# Patient Record
Sex: FEMALE | Race: BLACK OR AFRICAN AMERICAN | Employment: FULL TIME | URBAN - METROPOLITAN AREA
[De-identification: names, ages, dates, MRNs, and addresses within clinical notes are randomized per-mention and may not be internally consistent; named-entity substitution may affect disease eponyms.]

---

## 2017-12-05 ENCOUNTER — PRENATAL OFFICE VISIT (OUTPATIENT)
Dept: NURSING | Facility: CLINIC | Age: 22
End: 2017-12-05
Payer: COMMERCIAL

## 2017-12-05 VITALS
HEIGHT: 63 IN | DIASTOLIC BLOOD PRESSURE: 60 MMHG | TEMPERATURE: 98 F | BODY MASS INDEX: 21.26 KG/M2 | SYSTOLIC BLOOD PRESSURE: 102 MMHG | WEIGHT: 120 LBS | HEART RATE: 94 BPM

## 2017-12-05 DIAGNOSIS — Z34.90 SUPERVISION OF NORMAL PREGNANCY: Primary | ICD-10-CM

## 2017-12-05 PROBLEM — H52.03 HYPEROPIA, BILATERAL: Status: ACTIVE | Noted: 2017-06-01

## 2017-12-05 PROBLEM — H04.123 DRY EYES, BILATERAL: Status: ACTIVE | Noted: 2017-06-01

## 2017-12-05 LAB
ABO + RH BLD: NORMAL
ABO + RH BLD: NORMAL
ALBUMIN UR-MCNC: NEGATIVE MG/DL
APPEARANCE UR: CLEAR
BILIRUB UR QL STRIP: NEGATIVE
BLD GP AB SCN SERPL QL: NORMAL
BLOOD BANK CMNT PATIENT-IMP: NORMAL
COLOR UR AUTO: YELLOW
ERYTHROCYTE [DISTWIDTH] IN BLOOD BY AUTOMATED COUNT: 12.6 % (ref 10–15)
GLUCOSE UR STRIP-MCNC: NEGATIVE MG/DL
HCT VFR BLD AUTO: 31.1 % (ref 35–47)
HGB BLD-MCNC: 10.9 G/DL (ref 11.7–15.7)
HGB UR QL STRIP: NEGATIVE
KETONES UR STRIP-MCNC: NEGATIVE MG/DL
LEUKOCYTE ESTERASE UR QL STRIP: NEGATIVE
MCH RBC QN AUTO: 31.5 PG (ref 26.5–33)
MCHC RBC AUTO-ENTMCNC: 35 G/DL (ref 31.5–36.5)
MCV RBC AUTO: 90 FL (ref 78–100)
NITRATE UR QL: NEGATIVE
PH UR STRIP: 7 PH (ref 5–7)
PLATELET # BLD AUTO: 224 10E9/L (ref 150–450)
RBC # BLD AUTO: 3.46 10E12/L (ref 3.8–5.2)
SOURCE: ABNORMAL
SP GR UR STRIP: 1.01 (ref 1–1.03)
SPECIMEN EXP DATE BLD: NORMAL
UROBILINOGEN UR STRIP-ACNC: 2 EU/DL (ref 0.2–1)
WBC # BLD AUTO: 5.6 10E9/L (ref 4–11)

## 2017-12-05 PROCEDURE — 86901 BLOOD TYPING SEROLOGIC RH(D): CPT | Performed by: ADVANCED PRACTICE MIDWIFE

## 2017-12-05 PROCEDURE — 85027 COMPLETE CBC AUTOMATED: CPT | Performed by: ADVANCED PRACTICE MIDWIFE

## 2017-12-05 PROCEDURE — 86780 TREPONEMA PALLIDUM: CPT | Performed by: ADVANCED PRACTICE MIDWIFE

## 2017-12-05 PROCEDURE — 83021 HEMOGLOBIN CHROMOTOGRAPHY: CPT | Mod: 90 | Performed by: ADVANCED PRACTICE MIDWIFE

## 2017-12-05 PROCEDURE — 83020 HEMOGLOBIN ELECTROPHORESIS: CPT | Mod: 90 | Performed by: ADVANCED PRACTICE MIDWIFE

## 2017-12-05 PROCEDURE — 87340 HEPATITIS B SURFACE AG IA: CPT | Performed by: ADVANCED PRACTICE MIDWIFE

## 2017-12-05 PROCEDURE — 99207 ZZC NO CHARGE NURSE ONLY: CPT

## 2017-12-05 PROCEDURE — 87086 URINE CULTURE/COLONY COUNT: CPT | Performed by: ADVANCED PRACTICE MIDWIFE

## 2017-12-05 PROCEDURE — 86850 RBC ANTIBODY SCREEN: CPT | Performed by: ADVANCED PRACTICE MIDWIFE

## 2017-12-05 PROCEDURE — 99000 SPECIMEN HANDLING OFFICE-LAB: CPT | Performed by: ADVANCED PRACTICE MIDWIFE

## 2017-12-05 PROCEDURE — 81003 URINALYSIS AUTO W/O SCOPE: CPT | Performed by: ADVANCED PRACTICE MIDWIFE

## 2017-12-05 PROCEDURE — 36415 COLL VENOUS BLD VENIPUNCTURE: CPT | Performed by: ADVANCED PRACTICE MIDWIFE

## 2017-12-05 PROCEDURE — 86762 RUBELLA ANTIBODY: CPT | Performed by: ADVANCED PRACTICE MIDWIFE

## 2017-12-05 PROCEDURE — 86900 BLOOD TYPING SEROLOGIC ABO: CPT | Performed by: ADVANCED PRACTICE MIDWIFE

## 2017-12-05 PROCEDURE — 87389 HIV-1 AG W/HIV-1&-2 AB AG IA: CPT | Performed by: ADVANCED PRACTICE MIDWIFE

## 2017-12-05 RX ORDER — OLANZAPINE 10 MG/1
10 TABLET ORAL
COMMUNITY
Start: 2017-11-10 | End: 2018-04-16

## 2017-12-05 RX ORDER — PRENATAL VIT/IRON FUM/FOLIC AC 27MG-0.8MG
1 TABLET ORAL DAILY
COMMUNITY
End: 2018-08-21

## 2017-12-05 NOTE — NURSING NOTE
"Chief Complaint   Patient presents with     Prenatal Care     NEW OB TEACHING AND LABS       Initial /60  Pulse 94  Temp 98  F (36.7  C)  Ht 5' 3\" (1.6 m)  Wt 120 lb (54.4 kg)  LMP 10/22/2017  BMI 21.26 kg/m2 Estimated body mass index is 21.26 kg/(m^2) as calculated from the following:    Height as of this encounter: 5' 3\" (1.6 m).    Weight as of this encounter: 120 lb (54.4 kg).  Medication Reconciliation: complete    "

## 2017-12-05 NOTE — MR AVS SNAPSHOT
After Visit Summary   12/5/2017    Sharmayne Morgan    MRN: [id number]           Patient Information     Date Of Birth          1995        Visit Information        Provider Department      12/5/2017 12:45 PM RD OB [place]SE [place] [facility]        Today's Diagnoses     Supervision of normal pregnancy    -  1       Follow-ups after your visit        Additional Services     MAT FETAL MED CTR REFERRAL-PREGNANCY       >> Patient may proceed with recommendations for further testing as directed by the Maternal Fetal Medicine Specialist >>    >> If requesting Fetal Echo: MFM will determine appropriate location for exam due to indication.    >> If requesting Lung Maturity Amnio:  If results indicate fetal lung maturity, induction or C/S is recommended within 36 hours.  Please schedule accordingly.     Dear Patient:   Please be aware that coverage of these services is subject to the terms and limitations of your health insurance plan.  Call member services at your health plan with any benefit or coverage questions.      Please bring the following to your appointment:    >>  Any x-rays, CTs or MRIs which have been performed.  Contact the facility where they were done to arrange for  prior to your scheduled appointment.  Any new CT, MRI or other procedures ordered by your specialist must be performed at a [facility] facility or coordinated by your clinic's referral office.  >>  List of current medications   >>  This referral request   >>  Any documents/labs given to you for this referral                  Your next 10 appointments already scheduled     Jan 02, 2018 12:00 PM CST   New Prenatal with ANGELA Gomes CNM   [facility] [place]    [address] [place] [id number] [phone]              Who to contact     If you have questions or need follow up information about today's clinic visit or your  "schedule please contact [facility] directly at [phone].  Normal or non-critical lab and imaging results will be communicated to you by MyChart, letter or phone within 4 business days after the clinic has received the results. If you do not hear from us within 7 days, please contact the clinic through MyChart or phone. If you have a critical or abnormal lab result, we will notify you by phone as soon as possible.  Submit refill requests through [employer] or call your pharmacy and they will forward the refill request to us. Please allow 3 business days for your refill to be completed.          Additional Information About Your Visit        Care EveryWhere ID     This is your Care EveryWhere ID. This could be used by other organizations to access your [place] medical records  [id number]        Your Vitals Were     Pulse Temperature Height Last Period BMI (Body Mass Index)       94 98  F (36.7  C) 5' 3\" (1.6 m) 10/22/2017 21.26 kg/m2        Blood Pressure from Last 3 Encounters:   12/05/17 102/60    Weight from Last 3 Encounters:   12/05/17 120 lb (54.4 kg)              We Performed the Following     ABO/RH Type and Screen     Anti Treponema     CBC with Platelets     Hepatitis B surface antigen     HGB Eval Reflex to ELP or RBC Solubility     HIV Antigen Antibody Combo     MAT FETAL MED CTR REFERRAL-PREGNANCY     Rubella Antibody IgG Quantitative     UA without Microscopic     Urine Culture Aerobic Bacterial        Primary Care Provider Fax #    Physician No Ref-Primary [phone]       No address on file        Equal Access to Services     [place]ON [place] : Hadii brian cardo Sovaneali, waaxda luqadaha, qaybta kaalmada adeegyada, oralia onofre . So [facility] [phone].    ATENCIÓN: Si habla español, tiene a horn disposición servicios gratuitos de asistencia lingüística. Llame al [phone].    We comply with applicable federal civil rights laws and [place] laws. We do " not discriminate on the basis of race, color, national origin, age, disability, sex, sexual orientation, or gender identity.            Thank you!     Thank you for choosing [facility]  for your care. Our goal is always to provide you with excellent care. Hearing back from our patients is one way we can continue to improve our services. Please take a few minutes to complete the written survey that you may receive in the mail after your visit with us. Thank you!             Your Updated Medication List - Protect others around you: Learn how to safely use, store and throw away your medicines at www.disposemymeds.org.          This list is accurate as of: 12/5/17  1:23 PM.  Always use your most recent med list.                   Brand Name Dispense Instructions for use Diagnosis    OLANZapine 10 MG tablet    zyPREXA     Take 10 mg by mouth        prenatal multivitamin plus iron 27-0.8 MG Tabs per tablet      Take 1 tablet by mouth daily        sertraline 50 MG tablet    ZOLOFT     Take 50 mg by mouth
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
## 2017-12-06 LAB
BACTERIA SPEC CULT: NORMAL
HBV SURFACE AG SERPL QL IA: NONREACTIVE
HIV 1+2 AB+HIV1 P24 AG SERPL QL IA: NONREACTIVE
RUBV IGG SERPL IA-ACNC: 11 IU/ML
SPECIMEN SOURCE: NORMAL
T PALLIDUM IGG+IGM SER QL: NEGATIVE

## 2017-12-08 LAB
HGB A1 MFR BLD: 96.5 % (ref 95–97.9)
HGB A2 MFR BLD: 3 % (ref 2–3.5)
HGB C MFR BLD: 0 % (ref 0–0)
HGB E MFR BLD: 0 % (ref 0–0)
HGB F MFR BLD: 0.5 % (ref 0–2.1)
HGB FRACT BLD ELPH-IMP: NORMAL
HGB OTHER MFR BLD: 0 % (ref 0–0)
HGB S BLD QL SOLY: NORMAL
HGB S MFR BLD: 0 % (ref 0–0)
PATH INTERP BLD-IMP: NORMAL

## 2018-01-25 ENCOUNTER — TRANSFERRED RECORDS (OUTPATIENT)
Dept: HEALTH INFORMATION MANAGEMENT | Facility: CLINIC | Age: 23
End: 2018-01-25

## 2018-01-25 LAB
CHLAMYDIA - HIM PATIENT REPORTED: NORMAL
PAP-ABSTRACT: NORMAL

## 2018-04-16 ENCOUNTER — PRENATAL OFFICE VISIT (OUTPATIENT)
Dept: NURSING | Facility: CLINIC | Age: 23
End: 2018-04-16

## 2018-04-16 VITALS
DIASTOLIC BLOOD PRESSURE: 64 MMHG | SYSTOLIC BLOOD PRESSURE: 102 MMHG | HEART RATE: 78 BPM | HEIGHT: 63 IN | WEIGHT: 119.4 LBS | BODY MASS INDEX: 21.16 KG/M2 | TEMPERATURE: 98 F

## 2018-04-16 DIAGNOSIS — Z34.90 SUPERVISION OF NORMAL PREGNANCY: Primary | ICD-10-CM

## 2018-04-16 PROBLEM — Z23 NEED FOR TDAP VACCINATION: Status: ACTIVE | Noted: 2018-04-16

## 2018-04-16 LAB
ALBUMIN UR-MCNC: NEGATIVE MG/DL
APPEARANCE UR: CLEAR
BILIRUB UR QL STRIP: NEGATIVE
COLOR UR AUTO: YELLOW
ERYTHROCYTE [DISTWIDTH] IN BLOOD BY AUTOMATED COUNT: 12.7 % (ref 10–15)
GLUCOSE UR STRIP-MCNC: NEGATIVE MG/DL
HCT VFR BLD AUTO: 33.9 % (ref 35–47)
HGB BLD-MCNC: 11.6 G/DL (ref 11.7–15.7)
HGB UR QL STRIP: NEGATIVE
KETONES UR STRIP-MCNC: NEGATIVE MG/DL
LEUKOCYTE ESTERASE UR QL STRIP: NEGATIVE
MCH RBC QN AUTO: 30.4 PG (ref 26.5–33)
MCHC RBC AUTO-ENTMCNC: 34.2 G/DL (ref 31.5–36.5)
MCV RBC AUTO: 89 FL (ref 78–100)
NITRATE UR QL: NEGATIVE
PH UR STRIP: 5.5 PH (ref 5–7)
PLATELET # BLD AUTO: 234 10E9/L (ref 150–450)
RBC # BLD AUTO: 3.81 10E12/L (ref 3.8–5.2)
SOURCE: NORMAL
SP GR UR STRIP: 1.02 (ref 1–1.03)
UROBILINOGEN UR STRIP-ACNC: 0.2 EU/DL (ref 0.2–1)
WBC # BLD AUTO: 4.5 10E9/L (ref 4–11)

## 2018-04-16 PROCEDURE — 86780 TREPONEMA PALLIDUM: CPT | Performed by: ADVANCED PRACTICE MIDWIFE

## 2018-04-16 PROCEDURE — 87086 URINE CULTURE/COLONY COUNT: CPT | Performed by: ADVANCED PRACTICE MIDWIFE

## 2018-04-16 PROCEDURE — 99207 ZZC NO CHARGE NURSE ONLY: CPT

## 2018-04-16 PROCEDURE — 86901 BLOOD TYPING SEROLOGIC RH(D): CPT | Performed by: ADVANCED PRACTICE MIDWIFE

## 2018-04-16 PROCEDURE — 36415 COLL VENOUS BLD VENIPUNCTURE: CPT | Performed by: ADVANCED PRACTICE MIDWIFE

## 2018-04-16 PROCEDURE — 86900 BLOOD TYPING SEROLOGIC ABO: CPT | Performed by: ADVANCED PRACTICE MIDWIFE

## 2018-04-16 PROCEDURE — 86850 RBC ANTIBODY SCREEN: CPT | Performed by: ADVANCED PRACTICE MIDWIFE

## 2018-04-16 PROCEDURE — 86762 RUBELLA ANTIBODY: CPT | Performed by: ADVANCED PRACTICE MIDWIFE

## 2018-04-16 PROCEDURE — 87340 HEPATITIS B SURFACE AG IA: CPT | Performed by: ADVANCED PRACTICE MIDWIFE

## 2018-04-16 PROCEDURE — 81003 URINALYSIS AUTO W/O SCOPE: CPT | Performed by: ADVANCED PRACTICE MIDWIFE

## 2018-04-16 PROCEDURE — 87389 HIV-1 AG W/HIV-1&-2 AB AG IA: CPT | Performed by: ADVANCED PRACTICE MIDWIFE

## 2018-04-16 PROCEDURE — 85027 COMPLETE CBC AUTOMATED: CPT | Performed by: ADVANCED PRACTICE MIDWIFE

## 2018-04-16 NOTE — MR AVS SNAPSHOT
"              After Visit Summary   4/16/2018    Sharmayne Morgan    MRN: [id number]           Patient Information     Date Of Birth          1995        Visit Information        Provider Department      4/16/2018 12:45 PM RD OB NURSE EDUCATION [facility]        Today's Diagnoses     Supervision of normal pregnancy    -  1       Follow-ups after your visit        Your next 10 appointments already scheduled     May 14, 2018 11:00 AM CDT   New Prenatal with ANGELA Henao CNM   [facility] ([facility])    [address] [place] [id number] [phone]              Who to contact     If you have questions or need follow up information about today's clinic visit or your schedule please contact [facility] directly at [phone].  Normal or non-critical lab and imaging results will be communicated to you by MyChart, letter or phone within 4 business days after the clinic has received the results. If you do not hear from us within 7 days, please contact the clinic through MyChart or phone. If you have a critical or abnormal lab result, we will notify you by phone as soon as possible.  Submit refill requests through [employer] or call your pharmacy and they will forward the refill request to us. Please allow 3 business days for your refill to be completed.          Additional Information About Your Visit        MyChart Information     [employer] lets you send messages to your doctor, view your test results, renew your prescriptions, schedule appointments and more. To sign up, go to [email or web address] . Click on \"Log in\" on the left side of the screen, which will take you to the Welcome page. Then click on \"Sign up Now\" on the right side of the page.     You will be asked to enter the access code listed below, as well as some personal information. Please follow the directions to create your username and password.   " "  Your access code is: [email or web address]  Expires: 7/15/2018  1:32 PM     Your access code will  in 90 days. If you need help or a new code, please call your [place] clinic or [phone].        Care EveryWhere ID     This is your Care EveryWhere ID. This could be used by other organizations to access your [place] medical records  [id number]        Your Vitals Were     Pulse Temperature Height Last Period Breastfeeding? BMI (Body Mass Index)    78 98  F (36.7  C) 5' 3\" (1.6 m) 2018 Unknown 21.15 kg/m2       Blood Pressure from Last 3 Encounters:   18 102/64   17 102/60    Weight from Last 3 Encounters:   18 119 lb 6.4 oz (54.2 kg)   17 120 lb (54.4 kg)              We Performed the Following     ABO/RH Type and Screen     Anti Treponema     CBC with Platelets     Hepatitis B surface antigen     HIV Antigen Antibody Combo     Rubella Antibody IgG Quantitative     UA without Microscopic     Urine Culture Aerobic Bacterial        Primary Care Provider Fax #    Physician No Ref-Primary [phone]       No address on file        Equal Access to Services     [facility] : Hadii aad ku hadasho Soomaali, waaxda luqadaha, qaybta kaalmada adeegyada, oralia onofre . So [facility] [phone].    ATENCIÓN: Si habla español, tiene a horn disposición servicios gratuitos de asistencia lingüística. Llame al [phone].    We comply with applicable federal civil rights laws and [place] laws. We do not discriminate on the basis of race, color, national origin, age, disability, sex, sexual orientation, or gender identity.            Thank you!     Thank you for choosing [facility]  for your care. Our goal is always to provide you with excellent care. Hearing back from our patients is one way we can continue to improve our services. Please take a few minutes to complete the written survey that you may receive in the mail after your visit with us. Thank " you!             Your Updated Medication List - Protect others around you: Learn how to safely use, store and throw away your medicines at www.disposemymeds.org.          This list is accurate as of 4/16/18  1:32 PM.  Always use your most recent med list.                   Brand Name Dispense Instructions for use Diagnosis    prenatal multivitamin plus iron 27-0.8 MG Tabs per tablet      Take 1 tablet by mouth daily

## 2018-04-16 NOTE — NURSING NOTE
"Chief Complaint   Patient presents with     Prenatal Care     new ob teaching and labs       Initial /64  Pulse 78  Temp 98  F (36.7  C)  Ht 5' 3\" (1.6 m)  Wt 119 lb 6.4 oz (54.2 kg)  LMP 03/05/2018  Breastfeeding? Unknown  BMI 21.15 kg/m2 Estimated body mass index is 21.15 kg/(m^2) as calculated from the following:    Height as of this encounter: 5' 3\" (1.6 m).    Weight as of this encounter: 119 lb 6.4 oz (54.2 kg).  Medication Reconciliation: complete    "
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
## 2018-04-17 LAB
ABO + RH BLD: NORMAL
ABO + RH BLD: NORMAL
BACTERIA SPEC CULT: NORMAL
BLD GP AB SCN SERPL QL: NORMAL
BLOOD BANK CMNT PATIENT-IMP: NORMAL
HBV SURFACE AG SERPL QL IA: NONREACTIVE
HIV 1+2 AB+HIV1 P24 AG SERPL QL IA: NONREACTIVE
SPECIMEN EXP DATE BLD: NORMAL
SPECIMEN SOURCE: NORMAL

## 2018-04-18 LAB
RUBV IGG SERPL IA-ACNC: 13 IU/ML
T PALLIDUM IGG+IGM SER QL: NEGATIVE

## 2018-05-14 ENCOUNTER — TELEPHONE (OUTPATIENT)
Dept: MIDWIFE SERVICES | Facility: CLINIC | Age: 23
End: 2018-05-14

## 2018-05-14 ENCOUNTER — PRENATAL OFFICE VISIT (OUTPATIENT)
Dept: MIDWIFE SERVICES | Facility: CLINIC | Age: 23
End: 2018-05-14
Payer: COMMERCIAL

## 2018-05-14 VITALS
OXYGEN SATURATION: 98 % | WEIGHT: 120.7 LBS | BODY MASS INDEX: 21.39 KG/M2 | SYSTOLIC BLOOD PRESSURE: 112 MMHG | TEMPERATURE: 98.2 F | HEART RATE: 89 BPM | HEIGHT: 63 IN | DIASTOLIC BLOOD PRESSURE: 70 MMHG

## 2018-05-14 DIAGNOSIS — Z34.81 ENCOUNTER FOR SUPERVISION OF OTHER NORMAL PREGNANCY IN FIRST TRIMESTER: ICD-10-CM

## 2018-05-14 DIAGNOSIS — Z12.4 SCREENING FOR CERVICAL CANCER: Primary | ICD-10-CM

## 2018-05-14 DIAGNOSIS — Z11.3 SCREENING EXAMINATION FOR VENEREAL DISEASE: ICD-10-CM

## 2018-05-14 DIAGNOSIS — Z34.80 SUPERVISION OF OTHER NORMAL PREGNANCY, ANTEPARTUM: ICD-10-CM

## 2018-05-14 DIAGNOSIS — O21.9 NAUSEA AND VOMITING OF PREGNANCY, ANTEPARTUM: ICD-10-CM

## 2018-05-14 DIAGNOSIS — L73.2 HYDRADENITIS: Primary | ICD-10-CM

## 2018-05-14 DIAGNOSIS — L73.2 HYDRADENITIS: ICD-10-CM

## 2018-05-14 PROCEDURE — 99207 ZZC FIRST OB VISIT: CPT | Performed by: ADVANCED PRACTICE MIDWIFE

## 2018-05-14 RX ORDER — CLINDAMYCIN PHOSPHATE 11.9 MG/ML
SOLUTION TOPICAL 2 TIMES DAILY
Qty: 60 ML | Refills: 11 | Status: SHIPPED | OUTPATIENT
Start: 2018-05-14 | End: 2019-06-24

## 2018-05-14 RX ORDER — PYRIDOXINE HCL (VITAMIN B6) 50 MG
50 TABLET ORAL 2 TIMES DAILY PRN
Qty: 90 TABLET | Refills: 0 | Status: SHIPPED | OUTPATIENT
Start: 2018-05-14 | End: 2018-11-26

## 2018-05-14 RX ORDER — CLINDAMYCIN PHOSPHATE, BENZOYL PEROXIDE 25; 10 MG/G; MG/G
0.5 GEL TOPICAL DAILY
Qty: 50 G | Refills: 11 | Status: SHIPPED | OUTPATIENT
Start: 2018-05-14 | End: 2019-06-24

## 2018-05-14 NOTE — PROGRESS NOTES
"NOB intake at [place].  C/O feeling fatigued and nauseated most days,  Reviewed comfort measures and rx for vit B6 and unisom given.  Pt also asking for rx for Hydradenitis, given.  Unsure dates, pt states she had an AB in January, then had to have a second D&C, and never really got a regular menses after that.  Dating U/S ordered.  Desires 1st tri screening, ordered.  Review of CNM service, call rotational and triage line.  Pt had pap at [facility] and it is in care everywhere.   RTC 4 wks     Sharmayne Morgan is a 22 year old female, -American, single    Pt presents to clinic today for a NOB visit.  Patient's last menstrual period was 2018.. Estimated Date of Delivery: Dec 10, 2018 is calculated from lmp alone     She has not had bleeding since her LMP.   She has had mild nausea. Weight loss has not occurred.   This was a planned pregnancy.   FOB is involved,  Alfredo     OTHER CONCERNS: Would like derm meds for Hydradenitis    Pt's hx of HSV is negative    ============================================  PERSONAL/SOCIAL HISTORY  Lives lives with their family.  Employment: Unemployed.  Her job involves sedentary activity .  Exercises no regular exercise program  Pt denies abuse and feels safe in her home and relationships.     REVIEW OF SYSTEMS  C: NEGATIVE for fever, chills, change in weight  I: NEGATIVE for worrisome rashes, moles or lesions  E/M: NEGATIVE for ear, mouth and throat problems  R: NEGATIVE for significant cough or SOB  CV: NEGATIVE for chest pain, palpitations or peripheral edema  GI: NEGATIVE for nausea, abdominal pain, heartburn, or change in bowel habits  : NEGATIVE for frequency, dysuria, or hematuria  PSYCHIATRIC:  NEGATIVE for depression, anxiety or other mental health concerns    PHYSICAL EXAM:  /70 (BP Location: Left arm, Patient Position: Sitting, Cuff Size: Adult Regular)  Pulse 89  Temp 98.2  F (36.8  C) (Oral)  Ht 5' 3\" (1.6 m)  Wt 120 lb 11.2 oz (54.7 kg)  LMP " 2018  SpO2 98%  BMI 21.38 kg/m2  BMI- Body mass index is 21.38 kg/(m^2)., RECOMMENDED WEIGHT GAIN: 15-25 lbs.  GENERAL:  Pleasant pregnant female, alert, cooperative and well groomed.  SKIN:  Warm and dry, without lesions or rashes  HEAD: Symmetrical features.  MOUTH:  Buccal mucosa pink, moist without lesions.  Teeth in good repair.    NECK:  Thyroid without enlargement and nodules.  Lymph nodes not palpable.   LUNGS:  Clear to auscultation.      HEART:  RRR without murmur.  ABDOMEN: Soft without masses , tenderness or organomegaly.  No CVA tenderness.  Uterus not palpable at size equal to dates.  No scars noted..  MUSCULOSKELETAL:  Full range of motion  EXTREMITIES:  No edema. No significant varicosities.     GC/CHLAMYDIA CULTURE OBTAINED:PATIENT DECLINED    ASSESSMENT:  Intrauterine pregnancy at 10w0d weeks gestation.     (Z12.4) Screening for cervical cancer  (primary encounter diagnosis)  Comment:   Plan:     (Z11.3) Screening examination for venereal disease  Comment:   Plan:     (L73.2) Hydradenitis  Comment:   Plan: chlorhexidine (HIBICLENS) 4 % liquid,         clindamycin (CLEOCIN T) 1 % solution,         Clindamycin Phos-benzoyl Perox (ACANYA) 1.2-2.5        % GEL            (O21.9) Nausea and vomiting of pregnancy, antepartum  Comment:   Plan: pyridOXINE (CVS VITAMIN B-6) 50 MG tablet,         doxylamine (UNISOM) 25 MG TABS tablet            (Z34.81) Encounter for supervision of other normal pregnancy in first trimester  Comment:   Plan: MAT FETAL MED CTR REFERRAL-PREGNANCY,  OB <         14 Weeks Single            (Z34.80) Supervision of other normal pregnancy, antepartum  Comment:   Plan:       PLAN:  Oriented to CNM service.    Instructed on use of triage nurse line and contacting the on call CNM after hours for an urgent need such as fever, vagina bleeding, bladder or vaginal infection, rupture of membranes,  or term labor.      Discussed the indications, uses for and false positives  for quad screen  and fetal survey ultrasound at 18-20 weeks gestation. Will inform us at the next visit if she wished to avail herself of these screens.    Instructed on best evidence for: weight gain for her weight and height for pregnancy; healthy diet; exercise and activity during pregnancy; and maintenance of a generally healthy lifestyle.     Discussed the harms, benefits, side effects and alternative therapies for current prescribed and OTC medications.    Angela Coe CNM     - - -

## 2018-05-14 NOTE — PROGRESS NOTES
"Chief Complaint   Patient presents with     Prenatal Care     10+0.       Initial /70 (BP Location: Left arm, Patient Position: Sitting, Cuff Size: Adult Regular)  Pulse 89  Temp 98.2  F (36.8  C) (Oral)  Ht 5' 3\" (1.6 m)  Wt 120 lb 11.2 oz (54.7 kg)  LMP 2018  SpO2 98%  BMI 21.38 kg/m2 Estimated body mass index is 21.38 kg/(m^2) as calculated from the following:    Height as of this encounter: 5' 3\" (1.6 m).    Weight as of this encounter: 120 lb 11.2 oz (54.7 kg).  BP completed using cuff size: regular        The following HM Due: NONE      The following patient reported/Care Every where data was sent to:  P ABSTRACT QUALITY INITIATIVES [36626]  Pap smear done on this date: 2018 (approximately), by [place] group: [facility], results were NIL.   Chlamydia testing done on this date: 2018      n/a and patient has appointment for today              "

## 2018-05-14 NOTE — Clinical Note
Pap smear done on this date: 1/25/2018 (approximately), by this group: [facility], results were NIL.  Chlamydia testing done on this date: 1/25/2018

## 2018-05-14 NOTE — MR AVS SNAPSHOT
After Visit Summary   5/14/2018    Sharmayne Morgan    MRN: [id number]           Patient Information     Date Of Birth          1995        Visit Information        Provider Department      5/14/2018 11:00 AM Angela Coe APRN CNM [facility]        Today's Diagnoses     Screening for cervical cancer    -  1    Screening examination for venereal disease        Hydradenitis        Nausea and vomiting of pregnancy, antepartum        Encounter for supervision of other normal pregnancy in first trimester        Supervision of other normal pregnancy, antepartum           Follow-ups after your visit        Additional Services     MAT FETAL MED CTR REFERRAL-PREGNANCY       >> Patient may proceed with recommendations for further testing as directed by the Maternal Fetal Medicine Specialist >>    >> If requesting Fetal Echo: MFM will determine appropriate location for exam due to indication.    >> If requesting Lung Maturity Amnio:  If results indicate fetal lung maturity, induction or C/S is recommended within 36 hours.  Please schedule accordingly.     Dear Patient:   Please be aware that coverage of these services is subject to the terms and limitations of your health insurance plan.  Call member services at your health plan with any benefit or coverage questions.      Please bring the following to your appointment:    >>  Any x-rays, CTs or MRIs which have been performed.  Contact the facility where they were done to arrange for  prior to your scheduled appointment.  Any new CT, MRI or other procedures ordered by your specialist must be performed at a [facility] facility or coordinated by your clinic's referral office.  >>  List of current medications   >>  This referral request   >>  Any documents/labs given to you for this referral                  Your next 10 appointments already scheduled     May 24, 2018 10:20 AM CDT   US OB < 14 WEEKS SINGLE with RDUS1   [facility]  [facility])    [address] [place] [address] [address]  [facility] [address]   [phone]           Please bring a list of your medicines (including vitamins, minerals and over-the-counter drugs). Also, tell your doctor about any allergies you may have. Wear comfortable clothes and leave your valuables at home.  If you re less than 20 weeks drink four 8-ounce glasses of fluid an hour before your exam. If you need to empty your bladder before your exam, try to release only a little urine. Then, drink another glass of fluid.  You may have up to two family members in the exam room. If you bring a small child, an adult must be there to care for him or her.  Please call the Imaging Department at your exam site with any questions.            May 24, 2018 11:00 AM CDT   ESTABLISHED PRENATAL with Caridad Che CNM   [address]  [address]  [facility] [address]   [phone]            Jun 04, 2018 11:00 AM CDT   Genetic Counseling with UR GEN COUNSELOR 1   [facility] Maternal Fetal Medicine - [facility])    [address]  [facility] [address]   [phone]            Jun 04, 2018 11:45 AM CDT   MFM NUCHAL TRANSLUCENCY with [id number]   [facility] Maternal Fetal Medicine Ultrasound - [facility])    [address]  [facility] [address]   [phone]            Jun 04, 2018 12:15 PM CDT   Radiology MD with MOISES SAMUELS MD   [facility] Maternal Fetal Medicine - [facility])    [address]  [facility] [address]   [phone]           Please arrive at the time given for your first appointment. This visit is used internally to schedule the physician's time during your ultrasound.            Jun 11, 2018 10:00 AM CDT   ESTABLISHED PRENATAL with ANGELA Yao  "CNM   [facility]    [address] [place] [id number] [phone]              Future tests that were ordered for you today     Open Future Orders        Priority Expected Expires Ordered    M Genetic Counseling Routine  2019    Maternal Fetal Nuchal Translucency Routine  3/14/2019 2018    US OB < 14 Weeks Single Routine  2019            Who to contact     If you have questions or need follow up information about today's clinic visit or your schedule please contact [facility] directly at [phone].  Normal or non-critical lab and imaging results will be communicated to you by MyChart, letter or phone within 4 business days after the clinic has received the results. If you do not hear from us within 7 days, please contact the clinic through MyChart or phone. If you have a critical or abnormal lab result, we will notify you by phone as soon as possible.  Submit refill requests through MEDEM or call your pharmacy and they will forward the refill request to us. Please allow 3 business days for your refill to be completed.          Additional Information About Your Visit        MyChart Information     MEDEM lets you send messages to your doctor, view your test results, renew your prescriptions, schedule appointments and more. To sign up, go to [email or web address] . Click on \"Log in\" on the left side of the screen, which will take you to the Welcome page. Then click on \"Sign up Now\" on the right side of the page.     You will be asked to enter the access code listed below, as well as some personal information. Please follow the directions to create your username and password.     Your access code is: [email or web address]  Expires: 7/15/2018  1:32 PM     Your access code will  in 90 days. If you need help or a new code, please call your [facility] or [phone].        Care " "EveryWhere ID     This is your Care EveryWhere ID. This could be used by other organizations to access your [facility] medical records  [id number]        Your Vitals Were     Pulse Temperature Height Last Period Pulse Oximetry BMI (Body Mass Index)    89 98.2  F (36.8  C) (Oral) 5' 3\" (1.6 m) 03/05/2018 98% 21.38 kg/m2       Blood Pressure from Last 3 Encounters:   05/14/18 112/70   04/16/18 102/64   12/05/17 102/60    Weight from Last 3 Encounters:   05/14/18 120 lb 11.2 oz (54.7 kg)   04/16/18 119 lb 6.4 oz (54.2 kg)   12/05/17 120 lb (54.4 kg)              We Performed the Following     MAT FETAL MED CTR REFERRAL-PREGNANCY          Today's Medication Changes          These changes are accurate as of 5/14/18  1:44 PM.  If you have any questions, ask your nurse or doctor.               Start taking these medicines.        Dose/Directions    Benzoyl Peroxide 6 % Lotn   Commonly known as:  BENZOYL PEROXIDE CLEANSER   Used for:  Hydradenitis   Started by:  Angela Coe APRN CNM        Externally apply topically daily as needed   Quantity:  170.3 g   Refills:  3       chlorhexidine 4 % liquid   Commonly known as:  HIBICLENS   Used for:  Hydradenitis   Started by:  Angela Coe APRN CNM        Apply topically daily as needed for wound care   Quantity:  236 mL   Refills:  3       clindamycin 1 % solution   Commonly known as:  CLEOCIN T   Used for:  Hydradenitis   Started by:  Angela Coe APRN CNM        Apply topically 2 times daily   Quantity:  60 mL   Refills:  11       Clindamycin Phos-benzoyl Perox 1.2-2.5 % Gel   Commonly known as:  ACANYA   Used for:  Hydradenitis   Started by:  Angela Coe APRN CNM        Dose:  0.5 inch   Externally apply 0.5 inches topically daily Apply a pea sized amount to each affected area of the face   Quantity:  50 g   Refills:  11       doxylamine 25 MG Tabs tablet   Commonly known as:  UNISOM   Used for:  Nausea and vomiting of pregnancy, antepartum "   Started by:  Angela Coe APRN CNM        Dose:  25 mg   Take 1 tablet (25 mg) by mouth At Bedtime   Quantity:  14 each   Refills:  0       pyridOXINE 50 MG tablet   Commonly known as:  CVS VITAMIN B-6   Used for:  Nausea and vomiting of pregnancy, antepartum   Started by:  Angela Coe APRN CNJULIO        Dose:  50 mg   Take 1 tablet (50 mg) by mouth 2 times daily as needed   Quantity:  90 tablet   Refills:  0            Where to get your medicines      These medications were sent to [facility] [place], [place] - [address]  [address] [facility] [address]     Phone:  [phone]     Benzoyl Peroxide 6 % Lotn    chlorhexidine 4 % liquid    clindamycin 1 % solution    Clindamycin Phos-benzoyl Perox 1.2-2.5 % Gel    doxylamine 25 MG Tabs tablet    pyridOXINE 50 MG tablet                Primary Care Provider Fax #    Physician No Ref-Primary [phone]       No address on file        Equal Access to Services     TRISTON DEL VALLE : Hadii brian ku hadasho Soomaali, waaxda luqadaha, qaybta kaalmada adeegyada, waxay idiin haykelly onofre . So [facility] [phone].    ATENCIÓN: Si habla español, tiene a horn disposición servicios gratuitos de asistencia lingüística. [facility] [phone].    We comply with applicable federal civil rights laws and [place] laws. We do not discriminate on the basis of race, color, national origin, age, disability, sex, sexual orientation, or gender identity.            Thank you!     Thank you for choosing [facility]  for your care. Our goal is always to provide you with excellent care. Hearing back from our patients is one way we can continue to improve our services. Please take a few minutes to complete the written survey that you may receive in the mail after your visit with us. Thank you!             Your Updated Medication List - Protect others around you: Learn how to safely use, store and throw away your medicines at  www.disposemymeds.org.          This list is accurate as of 5/14/18  1:44 PM.  Always use your most recent med list.                   Brand Name Dispense Instructions for use Diagnosis    Benzoyl Peroxide 6 % Lotn    BENZOYL PEROXIDE CLEANSER    170.3 g    Externally apply topically daily as needed    Hydradenitis       chlorhexidine 4 % liquid    HIBICLENS    236 mL    Apply topically daily as needed for wound care    Hydradenitis       clindamycin 1 % solution    CLEOCIN T    60 mL    Apply topically 2 times daily    Hydradenitis       Clindamycin Phos-benzoyl Perox 1.2-2.5 % Gel    ACANYA    50 g    Externally apply 0.5 inches topically daily Apply a pea sized amount to each affected area of the face    Hydradenitis       doxylamine 25 MG Tabs tablet    UNISOM    14 each    Take 1 tablet (25 mg) by mouth At Bedtime    Nausea and vomiting of pregnancy, antepartum       prenatal multivitamin plus iron 27-0.8 MG Tabs per tablet      Take 1 tablet by mouth daily        pyridOXINE 50 MG tablet    CVS VITAMIN B-6    90 tablet    Take 1 tablet (50 mg) by mouth 2 times daily as needed    Nausea and vomiting of pregnancy, antepartum

## 2018-05-14 NOTE — TELEPHONE ENCOUNTER
Pharmacy calling regarding the prescription for Acanya - not covered by insurance. Insurance will cover either Clindamycin HCL or Benzoyl Peroxide (pharmacy recommends this one). I have teed up pharmacy. Please send new rx. Thanks!  Liz New

## 2018-05-24 ENCOUNTER — PRENATAL OFFICE VISIT (OUTPATIENT)
Dept: MIDWIFE SERVICES | Facility: CLINIC | Age: 23
End: 2018-05-24
Attending: ADVANCED PRACTICE MIDWIFE
Payer: COMMERCIAL

## 2018-05-24 ENCOUNTER — RADIANT APPOINTMENT (OUTPATIENT)
Dept: ULTRASOUND IMAGING | Facility: CLINIC | Age: 23
End: 2018-05-24
Attending: ADVANCED PRACTICE MIDWIFE
Payer: COMMERCIAL

## 2018-05-24 VITALS
TEMPERATURE: 98.1 F | DIASTOLIC BLOOD PRESSURE: 69 MMHG | SYSTOLIC BLOOD PRESSURE: 114 MMHG | HEART RATE: 100 BPM | WEIGHT: 118.5 LBS | BODY MASS INDEX: 20.99 KG/M2

## 2018-05-24 DIAGNOSIS — Z34.81 ENCOUNTER FOR SUPERVISION OF OTHER NORMAL PREGNANCY IN FIRST TRIMESTER: ICD-10-CM

## 2018-05-24 DIAGNOSIS — Z34.80 SUPERVISION OF OTHER NORMAL PREGNANCY, ANTEPARTUM: Primary | ICD-10-CM

## 2018-05-24 PROCEDURE — 99207 ZZC PRENATAL VISIT: CPT | Performed by: ADVANCED PRACTICE MIDWIFE

## 2018-05-24 PROCEDURE — 76815 OB US LIMITED FETUS(S): CPT | Performed by: OBSTETRICS & GYNECOLOGY

## 2018-05-24 NOTE — MR AVS SNAPSHOT
After Visit Summary   5/24/2018    Sharmayne Morgan    MRN: [id number]           Patient Information     Date Of Birth          1995        Visit Information        Provider Department      5/24/2018 11:00 AM Caridad Che CNM [facility]         Follow-ups after your visit        Follow-up notes from your care team     Return in about 4 weeks (around 6/21/2018) for Prenatal care with [facility].      Your next 10 appointments already scheduled     Jun 04, 2018 11:00 AM CDT   Genetic Counseling with UR GEN COUNSELOR 1   [facility] Maternal Fetal Medicine - [facility])    [address]  [facility] [address]   [phone]            Jun 04, 2018 11:45 AM CDT   ARVIND NUCHAL TRANSLUCENCY with [id number]   [facility] Maternal Fetal Medicine Ultrasound - [facility])    [address]  [facility] [id number] [phone]            Jun 04, 2018 12:15 PM CDT   Radiology MD with MOISES SAMUELS MD   [facility] Maternal Fetal Medicine - [facility])    [address]  [facility] [address]   [phone]           Please arrive at the time given for your first appointment. This visit is used internally to schedule the physician's time during your ultrasound.            Jun 11, 2018 10:00 AM CDT   ESTABLISHED PRENATAL with ANGELA Yao CNM   [facility] [place]    [address] [place] [id number] [phone]              Who to contact     If you have questions or need follow up information about today's clinic visit or your schedule please contact [facility] directly at [phone].  Normal or non-critical lab and imaging results will be communicated to you by MyChart, letter or phone within 4 business days after the clinic has received the results. If you do  "not hear from us within 7 days, please contact the clinic through [employer] or phone. If you have a critical or abnormal lab result, we will notify you by phone as soon as possible.  Submit refill requests through [employer] or call your pharmacy and they will forward the refill request to us. Please allow 3 business days for your refill to be completed.          Additional Information About Your Visit        [employer] Information     [employer] lets you send messages to your doctor, view your test results, renew your prescriptions, schedule appointments and more. To sign up, go to [email or web address] . Click on \"Log in\" on the left side of the screen, which will take you to the Welcome page. Then click on \"Sign up Now\" on the right side of the page.     You will be asked to enter the access code listed below, as well as some personal information. Please follow the directions to create your username and password.     Your access code is: [email or web address]  Expires: 7/15/2018  1:32 PM     Your access code will  in 90 days. If you need help or a new code, please call your [place] clinic or [phone].        Care EveryWhere ID     This is your Care EveryWhere ID. This could be used by other organizations to access your [place] medical records  [id number]        Your Vitals Were     Pulse Temperature Last Period BMI (Body Mass Index)          100 98.1  F (36.7  C) (Oral) 2018 (Exact Date) 20.99 kg/m2         Blood Pressure from Last 3 Encounters:   18 114/69   18 112/70   18 102/64    Weight from Last 3 Encounters:   18 118 lb 8 oz (53.8 kg)   18 120 lb 11.2 oz (54.7 kg)   18 119 lb 6.4 oz (54.2 kg)              Today, you had the following     No orders found for display       Primary Care Provider Fax #    Physician No Ref-Primary [phone]       No address on file        Equal Access to Services     TRISTON DEL VALLE : jonathan Marley, kevinybjanene " oralia simpsontarik onofre ah. [facility] [phone].    ATENCIÓN: Si erin sorto, tiene a horn disposición servicios gratuitos de asistencia lingüística. Marni al [phone].    We comply with applicable federal civil rights laws and [place] laws. We do not discriminate on the basis of race, color, national origin, age, disability, sex, sexual orientation, or gender identity.            Thank you!     Thank you for choosing [facility]  for your care. Our goal is always to provide you with excellent care. Hearing back from our patients is one way we can continue to improve our services. Please take a few minutes to complete the written survey that you may receive in the mail after your visit with us. Thank you!             Your Updated Medication List - Protect others around you: Learn how to safely use, store and throw away your medicines at www.disposemymeds.org.          This list is accurate as of 5/24/18 11:19 AM.  Always use your most recent med list.                   Brand Name Dispense Instructions for use Diagnosis    Benzoyl Peroxide 6 % Lotn    BENZOYL PEROXIDE CLEANSER    170.3 g    Externally apply topically daily as needed    Hydradenitis       chlorhexidine 4 % liquid    HIBICLENS    236 mL    Apply topically daily as needed for wound care    Hydradenitis       clindamycin 1 % solution    CLEOCIN T    60 mL    Apply topically 2 times daily    Hydradenitis       Clindamycin Phos-benzoyl Perox 1.2-2.5 % Gel    ACANYA    50 g    Externally apply 0.5 inches topically daily Apply a pea sized amount to each affected area of the face    Hydradenitis       doxylamine 25 MG Tabs tablet    UNISOM    14 each    Take 1 tablet (25 mg) by mouth At Bedtime    Nausea and vomiting of pregnancy, antepartum       prenatal multivitamin plus iron 27-0.8 MG Tabs per tablet      Take 1 tablet by mouth daily        pyridOXINE 50 MG tablet    CVS VITAMIN B-6    90 tablet     Take 1 tablet (50 mg) by mouth 2 times daily as needed    Nausea and vomiting of pregnancy, antepartum

## 2018-05-24 NOTE — PROGRESS NOTES
Denies any leaking of fluid, vaginal bleeding, regular uterine contractions, or headaches or other concerns.  Here for dating US follow up.  She is sure of last period date, but had a small amount of bleeding 2 mos after.  Dating kept as is as it is +/- 7 days.  I told her that we never know the exact date of conception or when the baby will be born.  As the dating difference is borderline, the MDs might recommend change due date.    They have first trimester screen scheduled.  Sharmayne and MEAGHAN both have friends who have lost babies to genetic problems.    MEAGHAN is very excited since this is his first baby.    Reviewed to call [phone] for contractions, loss of fluid, vaginal bleeding, decreased fetal movement or any other questions or concerns.    RTC in 4 weeks.  Caridad Che, SHEELA, APRN, CNM

## 2018-05-30 ENCOUNTER — TELEPHONE (OUTPATIENT)
Dept: MIDWIFE SERVICES | Facility: CLINIC | Age: 23
End: 2018-05-30

## 2018-05-30 DIAGNOSIS — O21.9 NAUSEA AND VOMITING IN PREGNANCY PRIOR TO 22 WEEKS GESTATION: Primary | ICD-10-CM

## 2018-05-30 RX ORDER — ONDANSETRON 4 MG/1
4-8 TABLET, ORALLY DISINTEGRATING ORAL EVERY 8 HOURS PRN
Qty: 20 TABLET | Refills: 3 | Status: SHIPPED | OUTPATIENT
Start: 2018-05-30 | End: 2018-11-26

## 2018-05-31 ENCOUNTER — PRE VISIT (OUTPATIENT)
Dept: MATERNAL FETAL MEDICINE | Facility: CLINIC | Age: 23
End: 2018-05-31

## 2018-06-04 ENCOUNTER — OFFICE VISIT (OUTPATIENT)
Dept: MATERNAL FETAL MEDICINE | Facility: CLINIC | Age: 23
End: 2018-06-04
Attending: ADVANCED PRACTICE MIDWIFE
Payer: COMMERCIAL

## 2018-06-04 ENCOUNTER — APPOINTMENT (OUTPATIENT)
Dept: LAB | Facility: CLINIC | Age: 23
End: 2018-06-04
Attending: ADVANCED PRACTICE MIDWIFE
Payer: COMMERCIAL

## 2018-06-04 ENCOUNTER — HOSPITAL ENCOUNTER (OUTPATIENT)
Dept: ULTRASOUND IMAGING | Facility: CLINIC | Age: 23
Discharge: HOME | End: 2018-06-04
Attending: ADVANCED PRACTICE MIDWIFE | Admitting: OBSTETRICS & GYNECOLOGY
Payer: COMMERCIAL

## 2018-06-04 DIAGNOSIS — Z36.9 VISIT FOR PRENATAL SCREENING: Primary | ICD-10-CM

## 2018-06-04 DIAGNOSIS — O26.90 PREGNANCY RELATED CONDITION, ANTEPARTUM: ICD-10-CM

## 2018-06-04 DIAGNOSIS — Z36.9 FIRST TRIMESTER SCREENING: Primary | ICD-10-CM

## 2018-06-04 PROCEDURE — 76805 OB US >/= 14 WKS SNGL FETUS: CPT

## 2018-06-04 PROCEDURE — 96040 ZZH GENETIC COUNSELING, EACH 30 MINUTES: CPT | Mod: ZF | Performed by: GENETIC COUNSELOR, MS

## 2018-06-04 NOTE — MR AVS SNAPSHOT
After Visit Summary   6/4/2018    Sharmayne Morgan    MRN: [id number]           Patient Information     Date Of Birth          1995        Visit Information        Provider Department      6/4/2018 12:15 PM Brittanie Prince,  [facility] Maternal Fetal Medicine [facility]        Today's Diagnoses     First trimester screening    -  1       Follow-ups after your visit        Your next 10 appointments already scheduled     Jun 11, 2018 10:00 AM CDT   ESTABLISHED PRENATAL with ANGELA Yao CNM   [facility])    [address] [place] [address] [address]  [facility] [id number] [phone]            Jun 18, 2018  9:00 AM CDT   ESTABLISHED PRENATAL with ANGELA Gomes CNM   [facility] ([facility])    [address] [place] [id number] [phone]              Future tests that were ordered for you today     Open Future Orders        Priority Expected Expires Ordered    Maternal Fetal OB Complete 2/3 Tri Sngle Routine  3/14/2019 5/14/2018            Who to contact     If you have questions or need follow up information about today's clinic visit or your schedule please contact [facility] directly at [phone].  Normal or non-critical lab and imaging results will be communicated to you by [employer], letter or phone within 4 business days after the clinic has received the results. If you do not hear from us within 7 days, please contact the clinic through [employer] or phone. If you have a critical or abnormal lab result, we will notify you by phone as soon as possible.  Submit refill requests through [employer] or call your pharmacy and they will forward the refill request to us. Please allow 3 business days for your refill to be completed.          Additional Information About Your Visit        [employer] Information     [employer] lets you send  "messages to your doctor, view your test results, renew your prescriptions, schedule appointments and more. To sign up, go to [email or web address] . Click on \"Log in\" on the left side of the screen, which will take you to the Welcome page. Then click on \"Sign up Now\" on the right side of the page.     You will be asked to enter the access code listed below, as well as some personal information. Please follow the directions to create your username and password.     Your access code is: [email or web address]  Expires: 7/15/2018  1:32 PM     Your access code will  in 90 days. If you need help or a new code, please call your [place] clinic or [phone].        Care EveryWhere ID     This is your Care EveryWhere ID. This could be used by other organizations to access your [place] medical records  [id number]        Your Vitals Were     Last Period                   2018 (Exact Date)            Blood Pressure from Last 3 Encounters:   18 114/69   18 112/70   18 102/64    Weight from Last 3 Encounters:   18 53.8 kg (118 lb 8 oz)   18 54.7 kg (120 lb 11.2 oz)   18 54.2 kg (119 lb 6.4 oz)              Today, you had the following     No orders found for display       Primary Care Provider Fax #    Physician No Ref-Primary [phone]       No address on file        Equal Access to Services     TRISTON DEL VALLE : Hadii brian cardo Sojacklyn, waaxda luqadaha, qaybta kaalmada jammie, oralia onofre . So [facility] [phone].    ATENCIÓN: Si habla español, tiene a horn disposición servicios gratuitos de asistencia lingüística. Llame al [phone].    We comply with applicable federal civil rights laws and [place] laws. We do not discriminate on the basis of race, color, national origin, age, disability, sex, sexual orientation, or gender identity.            Thank you!     Thank you for choosing [facility]  for your care. " Our goal is always to provide you with excellent care. Hearing back from our patients is one way we can continue to improve our services. Please take a few minutes to complete the written survey that you may receive in the mail after your visit with us. Thank you!             Your Updated Medication List - Protect others around you: Learn how to safely use, store and throw away your medicines at www.disposemymeds.org.          This list is accurate as of 6/4/18 11:59 PM.  Always use your most recent med list.                   Brand Name Dispense Instructions for use Diagnosis    Benzoyl Peroxide 6 % Lotn    BENZOYL PEROXIDE CLEANSER    170.3 g    Externally apply topically daily as needed    Hydradenitis       chlorhexidine 4 % liquid    HIBICLENS    236 mL    Apply topically daily as needed for wound care    Hydradenitis       clindamycin 1 % solution    CLEOCIN T    60 mL    Apply topically 2 times daily    Hydradenitis       Clindamycin Phos-benzoyl Perox 1.2-2.5 % Gel    ACANYA    50 g    Externally apply 0.5 inches topically daily Apply a pea sized amount to each affected area of the face    Hydradenitis       doxylamine 25 MG Tabs tablet    UNISOM    14 each    Take 1 tablet (25 mg) by mouth At Bedtime    Nausea and vomiting of pregnancy, antepartum       ondansetron 4 MG ODT tab    ZOFRAN ODT    20 tablet    Take 1-2 tablets (4-8 mg) by mouth every 8 hours as needed for nausea    Nausea and vomiting in pregnancy prior to 22 weeks gestation       prenatal multivitamin plus iron 27-0.8 MG Tabs per tablet      Take 1 tablet by mouth daily        pyridOXINE 50 MG tablet    CVS VITAMIN B-6    90 tablet    Take 1 tablet (50 mg) by mouth 2 times daily as needed    Nausea and vomiting of pregnancy, antepartum

## 2018-06-04 NOTE — PROGRESS NOTES
[facility] [place]  Genetic Counseling Consult    Patient: Sharmayne Morgan YOB: 1995   Date of Service: 2018 Referring Provider: ANGELA Hernández CNM     Sharmayne Morgan was seen at [facility] [place] for genetic counseling consultation to discuss the options for routine screening for fetal chromosome abnormalities. She was accompanied to clinic today by her partner (the father of the baby).      Summary/Plan:     1. Today, we reviewed Sharmayne's pregnancy and family history, as well as discussed options for screening and testing during this pregnancy. See below for details.    2. After our discussion, Sharmayne wanted to proceed with a first trimester screen. However, her fetus measured too large today for nuchal translucency assessment.  Therefore, first trimester screening could not be done today.  See Dr. Prince' ultrasound report for more details.    3. Since a first trimester screen could not be completed today, Sharmayne could consider a maternal serum quad screen in this pregnancy.  She was going to talk with her primary prenatal care provider about this.    Pregnancy History:   /Parity: (3 SAB, 1 EAB)     Age at Delivery: 23 year old  MATT: 12/10/2018, by Last Menstrual Period; 18 by u/s dating Gestational Age: 13w0d by LMP    Sharmayne reports that she takes prenatal vitamins.  She also reports trying Unisom/B6 to help with her nausea.      Sharmayne reports that she found out that she was pregnant after being seen in the Emergency Room at [facility] on 18 for shortness of breath.  She reports that she had a CT scan that did not show any signs of a pulmonary embolism.  She reports that she was sent home with a nebulizer.      Sharmayne reports that she does have cats at home, but her partner reports that he takes care of the litter box.      Sharmayne reports that she has questions about her MATT.   "Sharmayne states that she had an elective termination the end of December; she reports being contacted the beginning of January to come back for a second D&C, but she report that she ended up passing some additional tissue/clots on her own and did not have this second D&C.  She reports only having a couple of periods after her termination before conceiving this pregnancy.  Per her [facility]IC records, she had an ultrasound on  which predicted an MATT of 12-3-18.      Sharmayne reports a history of 3 previous spontaneous first trimester pregnancy losses with a previous partner.  She reports that no particular testing was done for those losses; she reports having a D&C with one of those pregnancy losses.      Sharmayne reports that her previous two full-term pregnancies were with different partners than this pregnancy.  She reports no complications with those full-term pregnancies.       Family History:     A four-generation pedigree was obtained and will be scanned in under the  Media  tab in EPIC. The following significant findings were reported by Sharmayne and her partner (the father of this pregnancy):      Sharmayne reports that one of her sisters had a son that  of complications of a congenital heart defect; she reports that this nephew had \"3 holes in his heart\" and that there were attempts made at surgically correcting this issue.  She reports that he had oxygen at home, and she is wondering if he didn't get enough oxygen one night, as she reports his death \"was like SIDS.\"  Sharmayne was not aware of any other physical birth defects, significant medical issues, or developmental concerns in this individual; she was not aware of any other family history of known congenital heart defects.  (She does report that another sister and her father have a reported history of  \"heart murmur\"; she does not know many details of this, but she was not aware of any medical intervention being needed for this history.  We " "talked about that there are a variety of different reasons for heart murmurs and that not all individuals with a heart murmur have a congenital structural abnormality.)    We talked about that most heart defects occur as an isolated birth defect, meaning that most commonly, they are not part of an underlying genetic syndrome or condition.  We talked about that most heart defects are thought to have a \"multifactorial inheritance\" pattern, meaning that there are likely several genetic and/or environmental factors that have to come together in order to produce that birth defect.  We talked about that for most types of birth defects, we don't know those exact risk factors.  Assuming that the other \"heart murmurs\" in the family are not a structural defect, we talked about that this individual is far enough away in relationship to this pregnancy that I would not expect this history to significantly increase the risk of an isolated heart defect in this pregnancy.      Sharmayne reports that she has a son from a previous partner with ADHD and dyslexia.  Sharmayne reports that she thinks her father also had a reading disability and/or dyslexia.  Sharmayne's partner reports that he has ADHD.  We talked about that some families have an increased incidence of learning issues and are thought to likely have a genetic risk factor(s) in the family accounting for this.  However, what these exact genetic risk factors are is not well known for most families.  We did talk about that this family history would be important for their children's health care providers/teachers to be aware of, so that appropriate screening and monitoring can be done for potential learning issues.      Sharmayne reports that she thinks that one of her maternal aunts had a diagnosis of sickle cell disease, and she thinks that this aunt's son has sickle cell trait.  (She wasn't sure about which one had sickle cell trait and which one had sickle cell disease.)  " We reviewed the autosomal recessive pattern of inheritance associated with sickle cell disease.  See carrier screening section below for details.    Sharmayne reports that her mother had a son that was stillborn.  She reports she is not aware of a particular cause being identified for this loss; she is not aware of this baby being diagnosed with a particular birth defect or genetic issue.  Sharmayne reports that she thinks this might have been related to an umbilical cord accident, but she wasn't sure.  It is hard to be specific about risks to other family members without knowing more about the cause of this loss.  She reports no other family history of stillbirth.    Sharmayne reports that she has a paternal first cousin that was recently diagnosed with colon cancer in his 20s; she reports that she thinks another paternal cousin may have had early onset cancer as well.  We talked about that some families with a history of early onset cancer have a hereditary risk factor in the family that is increasing the risk of some cancers in the family.  We talked about that there are family cancer clinics available for individuals who want more specific genetic counseling about their family history of cancer and about additional screening and testing recommendations that might be made based on this history.  She reports that she is not aware of anybody in the family having genetic counseling or genetic testing with respect to this issue.  Sharmayne reports that she thinks evaluations are still being done for her cousin's recent colon cancer diagnosis.      Sharmayne reports a paternal aunt with schizophrenia and bipolar disorder.  Sharmayne's partner reports that his mother has bipolar disorder and that his father has anxiety/depression.  We talked about that individuals with a family history of mental health conditions are generally thought to be at increased risk to develop these conditions themselves; the exact genetic  and/or environmental factors that account for this are not well understood.  We talked about that this type of family history is important to be aware of so individuals can be monitored for potential symptoms of mental health conditions.     Otherwise, the reported family history is negative for severe intellectual disabilities and consanguinity.       Carrier Screening:       Sharmayne's partner reports that he is of  ancestry.  We briefly talked about that cystic fibrosis is an autosomal recessive genetic condition that occurs with increased frequency in individuals of  ancestry and carrier screening for this condition is available.  In addition,  screening in the [facility] includes cystic fibrosis.      Sharmayne reports that she is primarily of  ancestry.  As noted above, the hemoglobinopathies are a group of genetic blood diseases that occur with increased frequency in individuals of  ancestry and carrier screening for these conditions is available.  Carrier screening for the hemoglobinopathies includes a CBC with red blood cell indices, a ferritin level, and a quantitative hemoglobin electrophoresis or HPLC.  In addition,  screening in the [facility] includes many of the hemoglobinopathies.    We talked about that Sharmayne's previous records included a normal MCV, a normal MCH, and a normal quantitative hemoglobin analysis.  We talked about that these results indicated that she does NOT appear to be a carrier of sickle cell disease and indicates that her risk of having a baby with a clinically significant hemoglobinopathy is likely low.      We talked about that the carrier frequency of cystic fibrosis is less in individuals who have no known  ancestry.  Similarly, the carrier frequency of the hemoglobinopathies is less in individuals of (most areas of)  ancestry.    In addition to ethnic-based carrier screening, we talked  about that expanded carrier screening for mutations in a large panel of genes associated with autosomal recessive conditions (including cystic fibrosis, spinal muscular atrophy, and others) and X-linked recessive conditions (like Fragile X syndrome) is now available      If  Sharmayne decides that she would like to have other carrier screening in the future, I would be happy to help facilitate this, if needed.       Risk Assessment for Chromosome Conditions:       We talked about that the risk for fetal chromosome abnormalities increases with maternal age. We briefly discussed specific features of common chromosome abnormalities, including Down syndrome, trisomy 13, and trisomy 18.      At age 23 at delivery, the risk to have a baby with any chromosome abnormality at birth is about 1 in 476.       Testing Options:       We reviewed the benefits and limitations of screening and diagnostic tests:    - We talked about that screening tests provide a risk assessment specific to the pregnancy for certain fetal chromosome abnormalities, but cannot definitively diagnose or exclude a fetal chromosome abnormality. Follow-up genetic counseling and consideration of diagnostic testing is recommended with any abnormal screening result.     - We discussed that diagnostic tests are associated with a risk of miscarriage. These tests can detect fetal chromosome abnormalities with greater than 99% certainty. Results can rarely be complicated by maternal cell contamination or mosaicism and are limited by the resolution of cytogenetic G-banding technology.     -There is no screening nor diagnostic test that can detect all forms of birth defects or mental disability.      We discussed the following screening and testing options for today:     First trimester screening  - This test includes a first trimester ultrasound with nuchal translucency and nasal bone assessments, as well as maternal serum hCG, JAVAN-A, and AFP measurement.  - This  test primarily screens for fetal trisomy 21, trisomy 13, and trisomy 18.  - This test cannot screen for open neural tube defects, and so consideration of maternal serum AFP 15 weeks or later is recommended.      We discussed the following screening and testing options most commonly considered if a first trimester screen showed an increased risk for a chromosome problem in a pregnancy:     Non-invasive Prenatal Testing (NIPT)  - This test involves measurement of cell-free DNA testing, which is of both maternal and placental/fetal origin.  - First trimester ultrasound with nuchal translucency and nasal bone assessment is recommended to be performed along with NIPT, when appropriate.  - This test screens for fetal trisomy 21, trisomy 13, trisomy 18, and sex chromosome aneuploidy.  - While this test is thought to be highly specific/sensitive with respect to screening for trisomy 21, trisomy 13, and trisomy 18, rare false positives and false negatives do occur. There is still limited data about the exact sensitivity/specificity of this test with respect to screening for sex chromosome aneuploidy.  - Insurance coverage for this test is variable, and so patients are encouraged to contact their insurance companies if there are questions about coverage for this test.  -  This test cannot screen for open neural tube defects, and so consideration of maternal serum AFP 15 weeks or later is recommended.     Chorionic villus sampling (CVS)  - This invasive procedure is typically performed between 10 and 13 weeks of pregnancy, through which placental villi are obtained for the purpose of chromosome analysis and/or other prenatal genetic analysis.  - CVS is considered a diagnostic test for chromosome problems during pregnancy. Maternal cell contamination studies are performed, when indicated.  -The risk of pregnancy loss associated with a CVS procedure is generally estimated to be 1/200 or less.  -This test cannot screen for open  neural tube defects, and so consideration of maternal serum AFP 15 weeks or later is recommended.     Genetic Amniocentesis  - This is an invasive procedure typically performed at 15 weeks or later, through which amniotic fluid is obtained for the purpose of chromosome analysis and/or other prenatal genetic analysis.  - Amniocentesis is considered a diagnostic test for chromosome problems during pregnancy.  - The risk of pregnancy loss associated with amniocentesis is generally estimated to be 1/500 or less.  - Amniotic fluid AFP measurement is also done to screen for the possibility of open neural tube or ventral defects.     Comprehensive (Level II) ultrasound  - This detailed ultrasound is usually performed between 18-22 weeks gestation to screen for major birth defects.  - It also screens for ultrasound markers that might increase the risk for a chromosome problem in a pregnancy.     Face-to-face time of the genetic counseling meeting was 30 minutes.    Ellie Rico MS, [facility]  Genetic Counselor  Ph: [phone]  Pager: [phone]

## 2018-06-04 NOTE — MR AVS SNAPSHOT
After Visit Summary   6/4/2018    Sharmayne Morgan    MRN: [id number]           Patient Information     Date Of Birth          1995        Visit Information        Provider Department      6/4/2018 11:00 AM Katlin Rico GC [facility] Maternal Fetal Medicine [facility]        Today's Diagnoses     Visit for prenatal screening    -  1    Pregnancy related condition, antepartum           Follow-ups after your visit        Your next 10 appointments already scheduled     Jun 11, 2018 10:00 AM CDT   ESTABL[place]ENATAL with ANGELA Yao CNM   [facility]    [address] [place] [address] [address]  [facility] [id number] [phone]            Jun 18, 2018  9:00 AM CDT   ESTABLISHED PRENATAL with ANGELA Gomes CNM   [facility] [place]    [address] [place] [address] [address]  [facility] [id number] [phone]              Future tests that were ordered for you today     Open Future Orders        Priority Expected Expires Ordered    Maternal Fetal OB Complete 2/3 Tri Sngle Routine  3/14/2019 5/14/2018            Who to contact     If you have questions or need follow up information about today's clinic visit or your schedule please contact [facility]ERNAL [facility]ETAL [facility]IC[facility] directly at [phone].  Normal or non-critical lab and imaging results will be communicated to you by MyChart, letter or phone within 4 business days after the clinic has received the results. If you do not hear from us within 7 days, please contact the clinic through MyChart or phone. If you have a critical or abnormal lab result, we will notify you by phone as soon as possible.  Submit refill requests through [employer] or call your pharmacy and they will forward the refill request to us. Please allow 3 business days for your refill to be completed.          Additional Information About Your Visit       "  MyChart Information     [employer] lets you send messages to your doctor, view your test results, renew your prescriptions, schedule appointments and more. To sign up, go to [email or web address] . Click on \"Log in\" on the left side of the screen, which will take you to the Welcome page. Then click on \"Sign up Now\" on the right side of the page.     You will be asked to enter the access code listed below, as well as some personal information. Please follow the directions to create your username and password.     Your access code is: [email or web address]  Expires: 7/15/2018  1:32 PM     Your access code will  in 90 days. If you need help or a new code, please call your [facility] clinic or [phone].        Care EveryWhere ID     This is your Care EveryWhere ID. This could be used by other organizations to access your [facility] medical records  [id number]        Your Vitals Were     Last Period                   2018 (Exact Date)            Blood Pressure from Last 3 Encounters:   18 114/69   18 112/70   18 102/64    Weight from Last 3 Encounters:   18 53.8 kg (118 lb 8 oz)   18 54.7 kg (120 lb 11.2 oz)   18 54.2 kg (119 lb 6.4 oz)              We Performed the Following     [facility] Genetic Counseling        Primary Care Provider Fax #    Physician No Ref-Primary [phone]       No address on file        Equal Access to Services     TRISTON DEL VALLE : Hadii aad ku hadasho Soomaali, waaxda luqadaha, qaybta kaalmada adeegyada, oralia onofre . So [facility] [phone].    ATENCIÓN: Si habla español, tiene a horn disposición servicios gratuitos de asistencia lingüística. Llame al [phone].    We comply with applicable federal civil rights laws and [place] laws. We do not discriminate on the basis of race, color, national origin, age, disability, sex, sexual orientation, or gender identity.            Thank you!     Thank you for choosing [facility]  for your care. Our goal is always to provide you with excellent care. Hearing back from our patients is one way we can continue to improve our services. Please take a few minutes to complete the written survey that you may receive in the mail after your visit with us. Thank you!             Your Updated Medication List - Protect others around you: Learn how to safely use, store and throw away your medicines at www.disposemymeds.org.          This list is accurate as of 6/4/18  3:28 PM.  Always use your most recent med list.                   Brand Name Dispense Instructions for use Diagnosis    Benzoyl Peroxide 6 % Lotn    BENZOYL PEROXIDE CLEANSER    170.3 g    Externally apply topically daily as needed    Hydradenitis       chlorhexidine 4 % liquid    HIBICLENS    236 mL    Apply topically daily as needed for wound care    Hydradenitis       clindamycin 1 % solution    CLEOCIN T    60 mL    Apply topically 2 times daily    Hydradenitis       Clindamycin Phos-benzoyl Perox 1.2-2.5 % Gel    ACANYA    50 g    Externally apply 0.5 inches topically daily Apply a pea sized amount to each affected area of the face    Hydradenitis       doxylamine 25 MG Tabs tablet    UNISOM    14 each    Take 1 tablet (25 mg) by mouth At Bedtime    Nausea and vomiting of pregnancy, antepartum       ondansetron 4 MG ODT tab    ZOFRAN ODT    20 tablet    Take 1-2 tablets (4-8 mg) by mouth every 8 hours as needed for nausea    Nausea and vomiting in pregnancy prior to 22 weeks gestation       prenatal multivitamin plus iron 27-0.8 MG Tabs per tablet      Take 1 tablet by mouth daily        pyridOXINE 50 MG tablet    CVS VITAMIN B-6    90 tablet    Take 1 tablet (50 mg) by mouth 2 times daily as needed    Nausea and vomiting of pregnancy, antepartum

## 2018-06-05 NOTE — PROGRESS NOTES
"Please see \"Imaging\" tab under \"Chart Review\" for details of today's US.    Brittanie Prince, DO    "

## 2018-06-12 ENCOUNTER — PRENATAL OFFICE VISIT (OUTPATIENT)
Dept: MIDWIFE SERVICES | Facility: CLINIC | Age: 23
End: 2018-06-12
Payer: COMMERCIAL

## 2018-06-12 VITALS
OXYGEN SATURATION: 100 % | WEIGHT: 119.4 LBS | HEART RATE: 99 BPM | BODY MASS INDEX: 21.15 KG/M2 | SYSTOLIC BLOOD PRESSURE: 106 MMHG | DIASTOLIC BLOOD PRESSURE: 71 MMHG

## 2018-06-12 DIAGNOSIS — R44.3 HALLUCINATIONS: ICD-10-CM

## 2018-06-12 DIAGNOSIS — F33.3 SEVERE EPISODE OF RECURRENT MAJOR DEPRESSIVE DISORDER, WITH PSYCHOTIC FEATURES (H): ICD-10-CM

## 2018-06-12 DIAGNOSIS — Z34.82 ENCOUNTER FOR SUPERVISION OF OTHER NORMAL PREGNANCY IN SECOND TRIMESTER: Primary | ICD-10-CM

## 2018-06-12 PROBLEM — F32.A MOOD DISORDER OF DEPRESSED TYPE: Status: ACTIVE | Noted: 2018-06-12

## 2018-06-12 LAB
AMPHETAMINES UR QL SCN: NEGATIVE
CANNABINOIDS UR QL: NEGATIVE
COCAINE UR QL: NEGATIVE
OPIATES UR QL SCN: NEGATIVE
PCP UR QL SCN: NEGATIVE

## 2018-06-12 PROCEDURE — 99213 OFFICE O/P EST LOW 20 MIN: CPT | Performed by: ADVANCED PRACTICE MIDWIFE

## 2018-06-12 PROCEDURE — 80307 DRUG TEST PRSMV CHEM ANLYZR: CPT | Performed by: ADVANCED PRACTICE MIDWIFE

## 2018-06-12 RX ORDER — SERTRALINE HYDROCHLORIDE 25 MG/1
25 TABLET, FILM COATED ORAL DAILY
Qty: 30 TABLET | Refills: 0 | Status: ON HOLD | OUTPATIENT
Start: 2018-06-12 | End: 2018-11-30

## 2018-06-12 NOTE — MR AVS SNAPSHOT
After Visit Summary   6/12/2018    Sharmayne Morgan    MRN: [id number]           Patient Information     Date Of Birth          1995        Visit Information        Provider Department      6/12/2018 10:45 AM Angela Coe APRN CNM [facility]        Today's Diagnoses     Encounter for supervision of other normal pregnancy in second trimester    -  1    Hallucinations        Mood disorder of depressed type           Follow-ups after your visit        Additional Services     MENTAL HEALTH REFERRAL  - Adult; Outpatient Treatment; Individual/Couples/Family/Group Therapy/Health Psychology; G: [facility] [phone]; We will contact you to schedule the appointment or please call with any questions       All scheduling is subject to the client's specific insurance plan & benefits, provider/location availability, and provider clinical specialities.  Please arrive 15 minutes early for your first appointment and bring your completed paperwork.    Please be aware that coverage of these services is subject to the terms and limitations of your health insurance plan.  Call member services at your health plan with any benefit or coverage questions.                      MENTAL HEALTH REFERRAL  - Adult; Psychiatry and Medication Management; Psychiatry; [facility]: Psychiatry Clinic [phone]; We will contact you to schedule the appointment or please call with any questions       All scheduling is subject to the client's specific insurance plan & benefits, provider/location availability, and provider clinical specialities.  Please arrive 15 minutes early for your first appointment and bring your completed paperwork.    Please be aware that coverage of these services is subject to the terms and limitations of your health insurance plan.  Call member services at your health plan with any benefit or coverage questions.                            Your next 10 appointments  already scheduled     Jun 18, 2018 11:30 AM CDT   ESTABLISHED PRENATAL with ANGELA Knott CNM   [facility] ([facility])    [address] [place] [address] [address]  [facility] [id number] [phone]            Jun 29, 2018 10:00 AM CDT   ESTABLISHED PRENATAL with ANGELA Henao CNM   [facility] ([facility])    [address] [place] [address] [address]  [facility] [id number] [phone]            Jul 10, 2018  1:20 PM CDT   US OB > 14 WEEKS COMPLETE SINGLE with RDUS1   [facility] ([facility])    [address] [place] [address] 700  [facility] [id number] [phone]           Please bring a list of your medicines (including vitamins, minerals and over-the-counter drugs). Also, tell your doctor about any allergies you may have. Wear comfortable clothes and leave your valuables at home.  If you re less than 20 weeks drink four 8-ounce glasses of fluid an hour before your exam. If you need to empty your bladder before your exam, try to release only a little urine. Then, drink another glass of fluid.  You may have up to two family members in the exam room. If you bring a small child, an adult must be there to care for him or her.  Please call the Imaging Department at your exam site with any questions.            Jul 10, 2018  2:30 PM CDT   ESTABLISHED PRENATAL with ANGELA Martinez CNM   [facility] ([facility])    [address] [place] [address] [address]  [facility] [id number] [phone]              Future tests that were ordered for you today     Open Future Orders        Priority Expected Expires Ordered    US OB > 14 Weeks Complete Single Routine  6/12/2019 6/12/2018            Who to contact     If you have questions or need follow up information about today's clinic visit or your schedule please contact [facility] directly at [phone].  Normal or  "non-critical lab and imaging results will be communicated to you by MyChart, letter or phone within 4 business days after the clinic has received the results. If you do not hear from us within 7 days, please contact the clinic through [employer] [facility] or phone. If you have a critical or abnormal lab result, we will notify you by phone as soon as possible.  Submit refill requests through [employer] or call your pharmacy and they will forward the refill request to us. Please allow 3 business days for your refill to be completed.          Additional Information About Your Visit        [employer] Information     [employer] lets you send messages to your doctor, view your test results, renew your prescriptions, schedule appointments and more. To sign up, go to [email or web address] . Click on \"Log in\" on the left side of the screen, which will take you to the Welcome page. Then click on \"Sign up Now\" on the right side of the page.     You will be asked to enter the access code listed below, as well as some personal information. Please follow the directions to create your username and password.     Your access code is: [email or web address]  Expires: 7/15/2018  1:32 PM     Your access code will  in 90 days. If you need help or a new code, please call your [facility] clinic or [phone].        Care EveryWhere ID     This is your Care EveryWhere ID. This could be used by other organizations to access your [facility] medical records  [id number]        Your Vitals Were     Pulse Last Period Pulse Oximetry BMI (Body Mass Index)          99 2018 (Exact Date) 100% 21.15 kg/m2         Blood Pressure from Last 3 Encounters:   18 106/71   18 114/69   18 112/70    Weight from Last 3 Encounters:   18 119 lb 6.4 oz (54.2 kg)   18 118 lb 8 oz (53.8 kg)   18 120 lb 11.2 oz (54.7 kg)              We Performed the Following     Drug abuse scrn 7 UR (/) (RH, SH, UR)     MENTAL HEALTH REFERRAL  - " Adult; Outpatient Treatment; Individual/Couples/Family/Group Therapy/Health Psychology; [facility]: [facility] [phone]; We will contact you to schedule the appointment or please call with any questions     MENTAL HEALTH REFERRAL  - Adult; Psychiatry and Medication Management; Psychiatry; [facility]: Psychiatry Clinic [phone]; We will contact you to schedule the appointment or please call with any questions          Today's Medication Changes          These changes are accurate as of 6/12/18  1:47 PM.  If you have any questions, ask your nurse or doctor.               Start taking these medicines.        Dose/Directions    sertraline 25 MG tablet   Commonly known as:  ZOLOFT   Used for:  Mood disorder of depressed type        Dose:  25 mg   Take 1 tablet (25 mg) by mouth daily   Quantity:  30 tablet   Refills:  0            Where to get your medicines      These medications were sent to [facility] [address]  [address] [place] [address], [facility] [address]     Phone:  [phone]     sertraline 25 MG tablet                Primary Care Provider Fax #    Physician No Ref-Primary [phone]       No address on file        Equal Access to Services     TRISTON DEL VALLE : Hadshayan rodriguez Sojacklyn, waaxda luremigio, qaybta kaalphilip agudelo, oralia hays. So [facility] [phone].    ATENCIÓN: Si habla español, tiene a horn disposición servicios gratuitos de asistencia lingüística. Marni al [phone].    We comply with applicable federal civil rights laws and [place] laws. We do not discriminate on the basis of race, color, national origin, age, disability, sex, sexual orientation, or gender identity.            Thank you!     Thank you for choosing [facility]  for your care. Our goal is always to provide you with excellent care. Hearing back from our patients is one way we can continue to improve our services. Please take  a few minutes to complete the written survey that you may receive in the mail after your visit with us. Thank you!             Your Updated Medication List - Protect others around you: Learn how to safely use, store and throw away your medicines at www.disposemymeds.org.          This list is accurate as of 6/12/18  1:47 PM.  Always use your most recent med list.                   Brand Name Dispense Instructions for use Diagnosis    Benzoyl Peroxide 6 % Lotn    BENZOYL PEROXIDE CLEANSER    170.3 g    Externally apply topically daily as needed    Hydradenitis       chlorhexidine 4 % liquid    HIBICLENS    236 mL    Apply topically daily as needed for wound care    Hydradenitis       clindamycin 1 % solution    CLEOCIN T    60 mL    Apply topically 2 times daily    Hydradenitis       Clindamycin Phos-benzoyl Perox 1.2-2.5 % Gel    ACANYA    50 g    Externally apply 0.5 inches topically daily Apply a pea sized amount to each affected area of the face    Hydradenitis       doxylamine 25 MG Tabs tablet    UNISOM    14 each    Take 1 tablet (25 mg) by mouth At Bedtime    Nausea and vomiting of pregnancy, antepartum       ondansetron 4 MG ODT tab    ZOFRAN ODT    20 tablet    Take 1-2 tablets (4-8 mg) by mouth every 8 hours as needed for nausea    Nausea and vomiting in pregnancy prior to 22 weeks gestation       prenatal multivitamin plus iron 27-0.8 MG Tabs per tablet      Take 1 tablet by mouth daily        pyridOXINE 50 MG tablet    [employer] VITAMIN B-6    90 tablet    Take 1 tablet (50 mg) by mouth 2 times daily as needed    Nausea and vomiting of pregnancy, antepartum       sertraline 25 MG tablet    ZOLOFT    30 tablet    Take 1 tablet (25 mg) by mouth daily    Mood disorder of depressed type

## 2018-06-12 NOTE — PROGRESS NOTES
Pt request JOSSY 7, ordered.  Main concern is mental health, scored 13 on PHQ-9, was seen in [facility] mental health January and was started on zoloft and pyrexia.  She stopped her meds in April with pregnancy diagnosis.  States she doesn't know how much they were helping because she didn't feel like she had been on them long enough.  States she can't find one provider to help her, every time she goes to [facility] she has to see a number of people and tell her story over and over again.  At one point she was put on Clonopin and states 'That was just bad, it made me angry all the time'    When asked specifically (previous zyprexa use) , pt admits hallucinations, states 4-6 visual hallucinations daily.  Denies thoughts of harm to self or others.  In clinic today appears well groomed, engaged in visit, asking appropriate questions and tracking well.  In my interview I would not have thought she was struggling as she is, but when validated for her struggle and pain, she got teary.    A/P:  Major depressive episode with psychotic symptoms  Referral to mental health and psychiatry.  Also given number to [place] PPSM.  CNMs have also placed a call to the PAL line for consultation about getting her restarted on her antipsychotics.        Pt has appt 6/18/18 with SRUTHI Montes, and with myself 6/29/18.

## 2018-06-12 NOTE — PROGRESS NOTES
15w1d  Patient is feeling fatigued and starting to feel fetal movement. Denies regular contractions, loss of fluid, headaches, and vaginal bleeding. She continues to take Zofran for nausea, no vomiting. Weight has been maintained, no gain. We discussed normal weight gain. PHQ9 score today is 13. She states she stopped taking her medication for anxiety and depression in April when she found out she was pregnant. She would like to start medication again. Consult placed for psychiatry and Zoloft 50mg restarted today. She is also requesting a urine drug screen. She states she does not use marijuana and tested negative last week for a job screening, but she had trace amounts. She would like to have a negative test here to have confirmation of a negative drug screen. Return to clinic planned in 4-5 weeks for fetal survey. She is encouraged to call if with any concerns.   Debby Bravo  Student Nurse Midwife

## 2018-06-13 ENCOUNTER — TELEPHONE (OUTPATIENT)
Dept: MIDWIFE SERVICES | Facility: CLINIC | Age: 23
End: 2018-06-13

## 2018-06-13 DIAGNOSIS — Z34.80 SUPERVISION OF OTHER NORMAL PREGNANCY, ANTEPARTUM: ICD-10-CM

## 2018-06-13 DIAGNOSIS — F33.3 SEVERE EPISODE OF RECURRENT MAJOR DEPRESSIVE DISORDER, WITH PSYCHOTIC FEATURES (H): Primary | ICD-10-CM

## 2018-06-13 DIAGNOSIS — F32.A MOOD DISORDER OF DEPRESSED TYPE: ICD-10-CM

## 2018-06-13 RX ORDER — OLANZAPINE 5 MG/1
5 TABLET, ORALLY DISINTEGRATING ORAL AT BEDTIME
Qty: 30 TABLET | Refills: 0 | Status: SHIPPED | OUTPATIENT
Start: 2018-06-13 | End: 2018-06-18

## 2018-06-13 ASSESSMENT — PATIENT HEALTH QUESTIONNAIRE - PHQ9: SUM OF ALL RESPONSES TO PHQ QUESTIONS 1-9: 13

## 2018-06-13 NOTE — TELEPHONE ENCOUNTER
PA Initiation    Medication: Olanzapine  Insurance Company: [employer] - Phone [phone] Fax [phone]  Pharmacy Filling the Rx: [facility] [address] [place] [address] AT SEC OF [facility]  Filling Pharmacy Phone: [phone]  Filling Pharmacy Fax:    Start Date: 6/13/2018      THIS HAS BEEN SUBMITTED BY THE PRIOR-AUTHORIZATION TEAM. ANY QUESTIONS PLEASE CALL [phone]. THANK YOU

## 2018-06-13 NOTE — TELEPHONE ENCOUNTER
Prior Authorization Retail Medication Request    Medication/Dose: Olanzapine Take 1 tablet (5 mg) by mouth At Bedtime 1.2 tablet (2.5 mg) x 5 days and then 1 tablet (5 mg) x 5 days and then 2 tablets daily (10 mg).  ICD code (if different than what is on RX):  Severe episode of recurrent major depressive disorder, with psychotic features (H) [F33.3]   Previously Tried and Failed:  Zoloft, clonopine and pyrexia  Rationale:  Severe depression with hallucinations - no other medications have worked for her to treat severe symptoms    Insurance Name:  [facility]  Insurance ID:  KEY: [id number]      Pharmacy Information (if different than what is on RX)  Name:  [facility]  Phone:  [phone]

## 2018-06-13 NOTE — TELEPHONE ENCOUNTER
CONSULTATION WITH  PSYCHIATRIC PROVIDER via PAL REFERRAL LINE:    Discussed the care of Sharmayne Morgan with Dr Pimentel in regards to medication management for patients diagnosis of MMD with psychotic features and hallucinations.    Dr. Pimentel recommended that patient return to her previous medication regime of  Zoloft 50 and Zyprexa 10 mg.  There are no contraindications for there use in pregnancy and this is not a time to introduce alternative medications to patient for treatment due to her side effects from other medications tried which patient tolerated these dose and combination well    Titrate Zyprexa quickly from 2.5 to 5 in a week and then 10 mg start of week #2.    Referral information for a psychiatric mental health care giver was given.  Maria Eugenia Manzano at [facility] or  [facility] Dr Perez, [facility] where given.      PAL consult submitted to assist with contact numbers for these providers.  Email to Maria Eugenia Manzano sent.      ASSESSMENT: 15w2d    Referral for med mgt and mental health care.  PLAN: Restart Zyprexa as above.  Rx sent to pharmacy.  Silvio MILLER, [place]

## 2018-06-13 NOTE — TELEPHONE ENCOUNTER
Prior Authorization Approval    Authorization Effective Date: 5/14/2018  Authorization Expiration Date: 6/13/2019  Medication: Olanzapine   Approved Dose/Quantity:  Reference #: [id number]   Insurance Company: [employer] - Phone [phone] Fax [phone]  Expected CoPay:       CoPay Card Available:      Foundation Assistance Needed:    Which Pharmacy is filling the prescription (Not needed for infusion/clinic administered): [facility] [address] [place] [address] AT [facility]  Pharmacy Notified: Yes  Patient Notified: Yes

## 2018-06-14 ENCOUNTER — TELEPHONE (OUTPATIENT)
Dept: MIDWIFE SERVICES | Facility: CLINIC | Age: 23
End: 2018-06-14

## 2018-06-14 NOTE — TELEPHONE ENCOUNTER
Call to pt on phone to discuss rx for zyprexa and plan.    Pt states she took 25 mg of Zoloft Tu night and had a vivid dream of being out and needing to urinate, and waking to find she had urinated in her sleep. Pt states she now remembers why she stopped taking zoloft previously.   Pt states she does not want to take that medication again.    Pt is willing to restart the Zyprexa, discussed in detail triturating up, starting at 2.5 mg x 3 days, then 5 mg x 3 days and then 10 mg daily starting next week.  Pt states she understands and should be able to do that. Encouraged pt to call if she has questions about dosing after she picks up her rx.  Pt is planning to  and start today.  Reassured safe to take at this point in pregnancy.   Discussed Zyprexa was an antipsychotic and is intended to help with the hallucinations.  Also assists with sleep for many people.      Follow up and consultation with Obstetric Psychiatric provider see below:    Pt has appt with CNM on 2018.    (From my note 2018 )   Referral to mental health and psychiatry.  [place]PSM.  CNMs have also placed a call to the PAL line for consultation about getting her restarted on her antipsychotics.      (From telephone encounter 2018, SRUTHI Montes CNM)  CONSULTATION WITH  PSYCHIATRIC PROVIDER via PAL REFERRAL LINE:     Discussed the care of Sharmayne Morgan with Dr Pimentel in regards to medication management for patients diagnosis of MMD with psychotic features and hallucinations.     Dr. Pimentel recommended that patient return to her previous medication regime of  Zoloft 50 and Zyprexa 10 mg.  There are no contraindications for there use in pregnancy and this is not a time to introduce alternative medications to patient for treatment due to her side effects from other medications tried which patient tolerated these dose and combination well     Titrate Zyprexa quickly from 2.5 to 5 in a week and then 10  mg start of week #2.     Referral information for a psychiatric mental health care giver was given.  Maria Eugenia Manzano at [facility] or [facility] Dr Perez, [facility] where given.       PAL consult submitted to assist with contact numbers for these providers.  Email to Maria Eugenia Manzano sent.       ASSESSMENT: 15w2d    Referral for med mgt and mental health care.  PLAN: Restart Zyprexa as above.  Rx sent to pharmacy.  Silvio MILLER, [place]

## 2018-06-18 ENCOUNTER — PRENATAL OFFICE VISIT (OUTPATIENT)
Dept: MIDWIFE SERVICES | Facility: CLINIC | Age: 23
End: 2018-06-18
Payer: COMMERCIAL

## 2018-06-18 VITALS
DIASTOLIC BLOOD PRESSURE: 68 MMHG | TEMPERATURE: 98.5 F | SYSTOLIC BLOOD PRESSURE: 104 MMHG | WEIGHT: 121 LBS | HEART RATE: 89 BPM | BODY MASS INDEX: 21.43 KG/M2

## 2018-06-18 DIAGNOSIS — F33.3 SEVERE EPISODE OF RECURRENT MAJOR DEPRESSIVE DISORDER, WITH PSYCHOTIC FEATURES (H): ICD-10-CM

## 2018-06-18 DIAGNOSIS — Z34.80 SUPERVISION OF OTHER NORMAL PREGNANCY, ANTEPARTUM: Primary | ICD-10-CM

## 2018-06-18 PROCEDURE — 99213 OFFICE O/P EST LOW 20 MIN: CPT | Performed by: ADVANCED PRACTICE MIDWIFE

## 2018-06-18 NOTE — MR AVS SNAPSHOT
After Visit Summary   6/18/2018    Sharmayne Morgan    MRN: [id number]           Patient Information     Date Of Birth          1995        Visit Information        Provider Department      6/18/2018 11:30 AM Silvio Montes APRN CNM [facility]        Today's Diagnoses     Supervision of other normal pregnancy, antepartum    -  1    Severe episode of recurrent major depressive disorder, with psychotic features (H)        Mood disorder of depressed type           Follow-ups after your visit        Your next 10 appointments already scheduled     Jun 29, 2018 10:00 AM CDT   ESTABLISHED PRENATAL with ANGELA Henao CNM   [facility] ([facility])    [address] [place] [address] [address]  [facility] [id number] [phone]            Jul 10, 2018  1:20 PM CDT   US OB > 14 WEEKS COMPLETE SINGLE with RDUS1   [facility] ([facility])    [address] [place] [address] [address]  [facility] [id number] [phone]           Please bring a list of your medicines (including vitamins, minerals and over-the-counter drugs). Also, tell your doctor about any allergies you may have. Wear comfortable clothes and leave your valuables at home.  If you re less than 20 weeks drink four 8-ounce glasses of fluid an hour before your exam. If you need to empty your bladder before your exam, try to release only a little urine. Then, drink another glass of fluid.  You may have up to two family members in the exam room. If you bring a small child, an adult must be there to care for him or her.  Please call the Imaging Department at your exam site with any questions.            Jul 10, 2018  2:30 PM CDT   ESTABLISHED PRENATAL with ANGELA Martinez CNM   [facility] ([facility])    [address] [place] [address] [address]  [facility] [id number] [phone]              Who to contact     If you have questions or  "need follow up information about today's clinic visit or your schedule please contact [facility] directly at [phone].  Normal or non-critical lab and imaging results will be communicated to you by [employer], letter or phone within 4 business days after the clinic has received the results. If you do not hear from us within 7 days, please contact the clinic through [employer] or phone. If you have a critical or abnormal lab result, we will notify you by phone as soon as possible.  Submit refill requests through Flavourly or call your pharmacy and they will forward the refill request to us. Please allow 3 business days for your refill to be completed.          Additional Information About Your Visit        [employer] Information     Flavourly lets you send messages to your doctor, view your test results, renew your prescriptions, schedule appointments and more. To sign up, go to [email or web address] . Click on \"Log in\" on the left side of the screen, which will take you to the Welcome page. Then click on \"Sign up Now\" on the right side of the page.     You will be asked to enter the access code listed below, as well as some personal information. Please follow the directions to create your username and password.     Your access code is: [email or web address]  Expires: 7/15/2018  1:32 PM     Your access code will  in 90 days. If you need help or a new code, please call your [place] clinic or [phone].        Care EveryWhere ID     This is your Care EveryWhere ID. This could be used by other organizations to access your [place] medical records  [id number]        Your Vitals Were     Pulse Temperature Last Period BMI (Body Mass Index)          89 98.5  F (36.9  C) (Oral) 2018 (Exact Date) 21.43 kg/m2         Blood Pressure from Last 3 Encounters:   18 104/68   18 106/71   18 114/69    Weight from Last 3 Encounters:   18 121 lb (54.9 kg)   18 119 lb 6.4 oz (54.2 kg) "   05/24/18 118 lb 8 oz (53.8 kg)              Today, you had the following     No orders found for display         Where to get your medicines      These medications were sent to [facility] - [place], [place] - [address]  [address] [facility] [address], [facility] [address]     Phone:  [phone]     OLANZapine zydis 5 MG ODT tab          Primary Care Provider Fax #    Physician No Ref-Primary [phone]       No address on file        Equal Access to Services     TRISTON DEL VALLE : Hadii aad ku hadasho Soomaali, waaxda luqadaha, qaybta kaalmada adeegyada, waxay idiin hayaan adeeg kharash la'kelly hays. So [facility] [phone].    ATENCIÓN: Si habla español, tiene a horn disposición servicios gratuitos de asistencia lingüística. [facility] [phone].    We comply with applicable federal civil rights laws and [place] laws. We do not discriminate on the basis of race, color, national origin, age, disability, sex, sexual orientation, or gender identity.            Thank you!     Thank you for choosing [facility]  for your care. Our goal is always to provide you with excellent care. Hearing back from our patients is one way we can continue to improve our services. Please take a few minutes to complete the written survey that you may receive in the mail after your visit with us. Thank you!             Your Updated Medication List - Protect others around you: Learn how to safely use, store and throw away your medicines at www.disposemymeds.org.          This list is accurate as of 6/18/18 11:59 PM.  Always use your most recent med list.                   Brand Name Dispense Instructions for use Diagnosis    Benzoyl Peroxide 6 % Lotn    BENZOYL PEROXIDE CLEANSER    170.3 g    Externally apply topically daily as needed    Hydradenitis       chlorhexidine 4 % liquid    HIBICLENS    236 mL    Apply topically daily as needed for wound care    Hydradenitis       clindamycin 1 % solution    CLEOCIN T    60  mL    Apply topically 2 times daily    Hydradenitis       Clindamycin Phos-benzoyl Perox 1.2-2.5 % Gel    ACANYA    50 g    Externally apply 0.5 inches topically daily Apply a pea sized amount to each affected area of the face    Hydradenitis       doxylamine 25 MG Tabs tablet    UNISOM    14 each    Take 1 tablet (25 mg) by mouth At Bedtime    Nausea and vomiting of pregnancy, antepartum       OLANZapine zydis 5 MG ODT tab    zyPREXA    30 tablet    Take 1 tablet (5 mg) by mouth At Bedtime 1.2 tablet (2.5 mg) x 5 days and then 1 tablet (5 mg) x 5 days and then 2 tablets daily (10 mg).    Severe episode of recurrent major depressive disorder, with psychotic features (H)       ondansetron 4 MG ODT tab    ZOFRAN ODT    20 tablet    Take 1-2 tablets (4-8 mg) by mouth every 8 hours as needed for nausea    Nausea and vomiting in pregnancy prior to 22 weeks gestation       prenatal multivitamin plus iron 27-0.8 MG Tabs per tablet      Take 1 tablet by mouth daily        pyridOXINE 50 MG tablet    CVS VITAMIN B-6    90 tablet    Take 1 tablet (50 mg) by mouth 2 times daily as needed    Nausea and vomiting of pregnancy, antepartum       sertraline 25 MG tablet    ZOLOFT    30 tablet    Take 1 tablet (25 mg) by mouth daily    Severe episode of recurrent major depressive disorder, with psychotic features (H)

## 2018-06-20 RX ORDER — OLANZAPINE 5 MG/1
5 TABLET, ORALLY DISINTEGRATING ORAL AT BEDTIME
Qty: 30 TABLET | Refills: 0 | Status: SHIPPED | OUTPATIENT
Start: 2018-06-20 | End: 2018-09-06

## 2018-06-21 NOTE — PROGRESS NOTES
Sharmayne Morgan is here for PNV.  Has not scheduled appt for [facility] Psych clinic at [facility].  May have not answered unknown number.  Given number to call them and Psychologist who runs program.  Discussed restart on Zyprexa after consult with Psych Assist Line.  Discussed based on her acknowledging increased auditory and peripheral hallucinations.  Is having very vivid dreams and worse after restart of Zoloft so stopped.  Discussed restarting of Zyprexa and advance from 2.5 to 10 mg stable dosing.  Discussed at term we could titrate down Zyprexa as long as hallucinations are minimal to minimize withdrawal of .  May restart Zoloft after stable on Zyprexa as that combination has been helpful for Sharmayne in past.  Denies any self harm history or ideations.  ASSESSMENT: 16w2d  Auditory hallucinations, PLAN: Restart Zyprexa, Mental health counseling with medication mgt, US for fetal anatomy.     JE

## 2018-07-10 ENCOUNTER — RADIANT APPOINTMENT (OUTPATIENT)
Dept: ULTRASOUND IMAGING | Facility: CLINIC | Age: 23
End: 2018-07-10
Attending: ADVANCED PRACTICE MIDWIFE
Payer: COMMERCIAL

## 2018-07-10 ENCOUNTER — PRENATAL OFFICE VISIT (OUTPATIENT)
Dept: MIDWIFE SERVICES | Facility: CLINIC | Age: 23
End: 2018-07-10
Attending: ADVANCED PRACTICE MIDWIFE
Payer: COMMERCIAL

## 2018-07-10 VITALS
SYSTOLIC BLOOD PRESSURE: 102 MMHG | DIASTOLIC BLOOD PRESSURE: 67 MMHG | HEART RATE: 86 BPM | BODY MASS INDEX: 21.61 KG/M2 | WEIGHT: 122 LBS

## 2018-07-10 DIAGNOSIS — Z34.82 ENCOUNTER FOR SUPERVISION OF OTHER NORMAL PREGNANCY IN SECOND TRIMESTER: ICD-10-CM

## 2018-07-10 DIAGNOSIS — Z34.80 SUPERVISION OF OTHER NORMAL PREGNANCY, ANTEPARTUM: Primary | ICD-10-CM

## 2018-07-10 PROCEDURE — 76805 OB US >/= 14 WKS SNGL FETUS: CPT | Performed by: OBSTETRICS & GYNECOLOGY

## 2018-07-10 PROCEDURE — 99207 ZZC PRENATAL VISIT: CPT | Performed by: ADVANCED PRACTICE MIDWIFE

## 2018-07-10 NOTE — PROGRESS NOTES
19w1d  Here with Alfredo, having a boy, happy.   U/S verbal report all WNL.  Discussed 1 hour GCT, pt states 'always throws up'  Suggested eating something before coming in, may need a room to lay down in .   rtc in 5 weeks brittany

## 2018-07-10 NOTE — MR AVS SNAPSHOT
After Visit Summary   7/10/2018    Sharmayne Morgan    MRN: [id number]           Patient Information     Date Of Birth          1995        Visit Information        Provider Department      7/10/2018 2:30 PM [facility]        Today's Diagnoses     Supervision of other normal pregnancy, antepartum    -  1       Follow-ups after your visit        Your next 10 appointments already scheduled     Aug 02, 2018  9:00 AM CDT   Women's Well-Being with Comfort Warren PsyD   Psychiatry Clinic ([facility])    [place] [id number]  [address] [place] [id number] [phone]            Aug 09, 2018  9:30 AM CDT   Adult General Eval with Shannen Hernadez MD   Psychiatry Clinic ([facility])    [place] [id number]  [address] [place] [address] [id number] [phone]            Sep 06, 2018  8:00 AM CDT   Adult Med Follow UP with Shannen Hernadez MD   Psychiatry Clinic ([facility])    [place] [id number]  [address] [place] [address] [phone]              Who to contact     If you have questions or need follow up information about today's clinic visit or your schedule please contact [facility] directly at [phone].  Normal or non-critical lab and imaging results will be communicated to you by MyChart, letter or phone within 4 business days after the clinic has received the results. If you do not hear from us within 7 days, please contact the clinic through MyChart or phone. If you have a critical or abnormal lab result, we will notify you by phone as soon as possible.  Submit refill requests through ShareThe or call your pharmacy and they will forward the refill request to us. Please allow 3 business days for your refill to be completed.          Additional Information About Your Visit        Care EveryWhere ID      This is your Care EveryWhere ID. This could be used by other organizations to access your [place] medical records  [id number]        Your Vitals Were     Pulse Last Period BMI (Body Mass Index)             86 03/05/2018 (Exact Date) 21.61 kg/m2          Blood Pressure from Last 3 Encounters:   07/10/18 102/67   06/18/18 104/68   06/12/18 106/71    Weight from Last 3 Encounters:   07/10/18 122 lb (55.3 kg)   06/18/18 121 lb (54.9 kg)   06/12/18 119 lb 6.4 oz (54.2 kg)              Today, you had the following     No orders found for display       Primary Care Provider Fax #    Physician No Ref-Primary [phone]       No address on file        Equal Access to Services     TRISTON DEL VALLE : Donny Ley, waaxda luqadaha, qaybta kaalmada faustoyaronnell, oralia onofre . So [facility] [phone].    ATENCIÓN: Si habla español, tiene a horn disposición servicios gratuitos de asistencia lingüística. Llame al [phone].    We comply with applicable federal civil rights laws and [place] laws. We do not discriminate on the basis of race, color, national origin, age, disability, sex, sexual orientation, or gender identity.            Thank you!     Thank you for choosing [facility]  for your care. Our goal is always to provide you with excellent care. Hearing back from our patients is one way we can continue to improve our services. Please take a few minutes to complete the written survey that you may receive in the mail after your visit with us. Thank you!             Your Updated Medication List - Protect others around you: Learn how to safely use, store and throw away your medicines at www.disposemymeds.org.          This list is accurate as of 7/10/18  2:53 PM.  Always use your most recent med list.                   Brand Name Dispense Instructions for use Diagnosis    Benzoyl Peroxide 6 % Lotn    BENZOYL PEROXIDE CLEANSER    170.3 g    Externally apply topically  daily as needed    Hydradenitis       chlorhexidine 4 % liquid    HIBICLENS    236 mL    Apply topically daily as needed for wound care    Hydradenitis       clindamycin 1 % solution    CLEOCIN T    60 mL    Apply topically 2 times daily    Hydradenitis       Clindamycin Phos-benzoyl Perox 1.2-2.5 % Gel    ACANYA    50 g    Externally apply 0.5 inches topically daily Apply a pea sized amount to each affected area of the face    Hydradenitis       doxylamine 25 MG Tabs tablet    UNISOM    14 each    Take 1 tablet (25 mg) by mouth At Bedtime    Nausea and vomiting of pregnancy, antepartum       OLANZapine zydis 5 MG ODT tab    zyPREXA    30 tablet    Take 1 tablet (5 mg) by mouth At Bedtime 1.2 tablet (2.5 mg) x 5 days and then 1 tablet (5 mg) x 5 days and then 2 tablets daily (10 mg).    Severe episode of recurrent major depressive disorder, with psychotic features (H)       ondansetron 4 MG ODT tab    ZOFRAN ODT    20 tablet    Take 1-2 tablets (4-8 mg) by mouth every 8 hours as needed for nausea    Nausea and vomiting in pregnancy prior to 22 weeks gestation       prenatal multivitamin plus iron 27-0.8 MG Tabs per tablet      Take 1 tablet by mouth daily        pyridOXINE 50 MG tablet    CVS VITAMIN B-6    90 tablet    Take 1 tablet (50 mg) by mouth 2 times daily as needed    Nausea and vomiting of pregnancy, antepartum       sertraline 25 MG tablet    ZOLOFT    30 tablet    Take 1 tablet (25 mg) by mouth daily    Severe episode of recurrent major depressive disorder, with psychotic features (H)

## 2018-08-13 ENCOUNTER — PRENATAL OFFICE VISIT (OUTPATIENT)
Dept: MIDWIFE SERVICES | Facility: CLINIC | Age: 23
End: 2018-08-13
Payer: COMMERCIAL

## 2018-08-13 ENCOUNTER — PATIENT OUTREACH (OUTPATIENT)
Dept: CARE COORDINATION | Facility: CLINIC | Age: 23
End: 2018-08-13

## 2018-08-13 VITALS
SYSTOLIC BLOOD PRESSURE: 97 MMHG | HEART RATE: 93 BPM | WEIGHT: 130 LBS | DIASTOLIC BLOOD PRESSURE: 62 MMHG | BODY MASS INDEX: 23.03 KG/M2 | OXYGEN SATURATION: 100 %

## 2018-08-13 DIAGNOSIS — R44.3 HALLUCINATIONS: ICD-10-CM

## 2018-08-13 DIAGNOSIS — D50.9 IRON DEFICIENCY ANEMIA, UNSPECIFIED IRON DEFICIENCY ANEMIA TYPE: ICD-10-CM

## 2018-08-13 DIAGNOSIS — Z34.80 SUPERVISION OF OTHER NORMAL PREGNANCY, ANTEPARTUM: Primary | ICD-10-CM

## 2018-08-13 DIAGNOSIS — F33.3 SEVERE EPISODE OF RECURRENT MAJOR DEPRESSIVE DISORDER, WITH PSYCHOTIC FEATURES (H): ICD-10-CM

## 2018-08-13 LAB
GLUCOSE 1H P 50 G GLC PO SERPL-MCNC: 70 MG/DL (ref 60–129)
HGB BLD-MCNC: 9.7 G/DL (ref 11.7–15.7)

## 2018-08-13 PROCEDURE — 82950 GLUCOSE TEST: CPT | Performed by: ADVANCED PRACTICE MIDWIFE

## 2018-08-13 PROCEDURE — 36415 COLL VENOUS BLD VENIPUNCTURE: CPT | Performed by: ADVANCED PRACTICE MIDWIFE

## 2018-08-13 PROCEDURE — 99213 OFFICE O/P EST LOW 20 MIN: CPT | Performed by: ADVANCED PRACTICE MIDWIFE

## 2018-08-13 PROCEDURE — 00000218 ZZHCL STATISTIC OBHBG - HEMOGLOBIN: Performed by: ADVANCED PRACTICE MIDWIFE

## 2018-08-13 RX ORDER — FERROUS SULFATE 325(65) MG
325 TABLET ORAL 2 TIMES DAILY
Qty: 60 TABLET | Refills: 2 | Status: SHIPPED | OUTPATIENT
Start: 2018-08-13 | End: 2018-08-21

## 2018-08-13 NOTE — PROGRESS NOTES
24w0d  Patient feeling well. Positive fetal movement. Denies water leaking, vaginal bleeding, decreased fetal movement, contraction pain, or other concerns.  Not doing well with mental health at this time. She feels like her depression is creeping up on her. She is crying during her visit. She continues to have both auditory and visual hallucinations. She reports her auditory hallucinations are her children's voices and sometimes she thinks about doing bad things to them. She feels confident she will not do anything to harm them. She realizes they are just thoughts and they make her really sad to be thinking those thoughts. She tends to get them while driving. She also reports she gets into bad mental spaces when she takes the Zoloft. She feels like it is hard to tell whether or not she is in reality and she does not like that feeling. She has not started the Zyprexa because she just did not pick it up. She has taken both Zoloft and Zyprexa in the past. She had the same concerns about feeling like she was unable to tell if she was in reality with the Zoloft. She does not feel like she had strong side effects from the Zyprexa. States neither worked that well. We discussed different options of stopping the Zoloft and starting Zyprexa or waiting to see if she can get into a psychiatrist soon before stopping or starting anything. She would like to stop Zoloft and start Zyprexa while she waits for a psychiatry appointment. We talked about [facility] having a unit where she can bring her children and also about coming into the ER here. She is unable to go to the ER today. She does not have much support. She has her mother and sister but they both work full time and are unable to take time off. She is unable to take time off also. She is living on her own with her 9 and 4 year old and needs to have an income to pay the bills. She missed her August 2nd psych appointment because of work. We discussed getting notes to be off work  for her appointments because it is so hard to get her in. She feels like that may work but she would not get paid for the hours she is gone and that can make a difference for her. I offered her a care coordination referral which she accepted. They were able to meet today while she was here. They set up appointments in September. They also talked about a crisis walk in center that is in [facility] close to where she lives. We discussed coming in next week to follow up on everything. She plans to go on vacation at the end of August so will make an appointment to be seen before she leaves.   Her pregnancy is going well. Having some discomfort but normal discomforts of pregnancy. She is having nausea and vomiting again. Can be daily. Has been coming and going throughout the second trimester. Stopped taking her prenatal and realized that helped. We discussed trying a gel or gummy tablet to see if that makes a difference. She is gaining weight appropriately. She also has an RX for Zofran at home to take which she is has not been taking.   Danger signs discussed.   Knows when to call triage and has phone numbers to call.   Follow up in 1-2 weeks before leaving on vacation.   Shira Holly CNM

## 2018-08-13 NOTE — MR AVS SNAPSHOT
After Visit Summary   8/13/2018    Sharmayne Morgan    MRN: [id number]           Patient Information     Date Of Birth          1995        Visit Information        Provider Department      8/13/2018 2:00 PM Shira Holly APRN [facility]        Today's Diagnoses     Supervision of other normal pregnancy, antepartum    -  1    Iron deficiency anemia, unspecified iron deficiency anemia type        Hallucinations        Severe episode of recurrent major depressive disorder, with psychotic features (H)           Follow-ups after your visit        Additional Services     CARE COORDINATION REFERRAL       Services are provided by a Care Coordinator for people with complex needs such as: medical, social, or financial troubles.  The Care Coordinator works with the patient and their Primary Care Provider to determine health goals, obtain resources, achieve outcomes, and develop care plans that help coordinate the patient's care.     Reason for Referral: Mental Wellness (Health) (Mental Illness/Chemical Depedency): Treatment Options    Additional pertinent details:  Depression, hallucinations     Clinical Staff have discussed the Care Coordination Referral with the patient and/or caregiver: yes                  Your next 10 appointments already scheduled     Sep 06, 2018  9:00 AM CDT   Women's Well-Being with Yanique Dean, PhD   Psychiatry Clinic ([facility])    [address]  [address] [address]   [phone]            Sep 13, 2018  9:30 AM CDT   Adult General Eval with Shannen Hernadez MD   Psychiatry Clinic ([facility])    [address]  [address] [address]   [phone]            Oct 11, 2018  8:00 AM CDT   Women's Well-Being Follow Up with Shannen Hernadez MD   Psychiatry Clinic ([facility])    [address] [place] [address]  [address] [address] [phone]              Who to contact     If you have questions or need follow up information about today's clinic visit or your schedule please contact [facility] directly at [phone].  Normal or non-critical lab and imaging results will be communicated to you by MyChart, letter or phone within 4 business days after the clinic has received the results. If you do not hear from us within 7 days, please contact the clinic through MyChart or phone. If you have a critical or abnormal lab result, we will notify you by phone as soon as possible.  Submit refill requests through [employer] or call your pharmacy and they will forward the refill request to us. Please allow 3 business days for your refill to be completed.          Additional Information About Your Visit        Care EveryWhere ID     This is your Care EveryWhere ID. This could be used by other organizations to access your [facility] medical records  [id number]        Your Vitals Were     Pulse Last Period Pulse Oximetry Breastfeeding? BMI (Body Mass Index)       93 03/05/2018 (Exact Date) 100% No 23.03 kg/m2        Blood Pressure from Last 3 Encounters:   08/13/18 97/62   07/10/18 102/67   06/18/18 104/68    Weight from Last 3 Encounters:   08/13/18 130 lb (59 kg)   07/10/18 122 lb (55.3 kg)   06/18/18 121 lb (54.9 kg)              We Performed the Following     CARE COORDINATION REFERRAL     Glucose tolerance gest screen 1 hour     OB hemoglobin          Today's Medication Changes          These changes are accurate as of 8/13/18  3:53 PM.  If you have any questions, ask your nurse or doctor.               Start taking these medicines.        Dose/Directions    ferrous sulfate 325 (65 Fe) MG tablet   Commonly known as:  IRON   Used for:  Iron deficiency anemia, unspecified iron deficiency anemia type   Started by:  Shira Holly APRN CNM        Dose:  325 mg   Take 1 tablet (325 mg) by  mouth 2 times daily   Quantity:  60 tablet   Refills:  2            Where to get your medicines      These medications were sent to [id number] - [place] [address] [facility] AT [address] [facility] [address]  [address] [address]     Phone:  [phone]     ferrous sulfate 325 (65 Fe) MG tablet                Primary Care Provider Fax #    Physician No Ref-Primary [phone]       No address on file        Equal Access to Services     TRISTON [place] VALLE : Hadii aad ku hadasho Soomaali, waaxda luqadaha, qaybta kaalmada adeegyada, waxay idiin hayaan adeeg giovannimaureentracy onofre . So [facility] [phone].    ATENCIÓN: Si habla español, tiene a horn disposición servicios gratuitos de asistencia lingüística. [facility] [phone].    We comply with applicable federal civil rights laws and [place] laws. We do not discriminate on the basis of race, color, national origin, age, disability, sex, sexual orientation, or gender identity.            Thank you!     Thank you for choosing [facility]  for your care. Our goal is always to provide you with excellent care. Hearing back from our patients is one way we can continue to improve our services. Please take a few minutes to complete the written survey that you may receive in the mail after your visit with us. Thank you!             Your Updated Medication List - Protect others around you: Learn how to safely use, store and throw away your medicines at www.disposemymeds.org.          This list is accurate as of 8/13/18  3:53 PM.  Always use your most recent med list.                   Brand Name Dispense Instructions for use Diagnosis    Benzoyl Peroxide 6 % Lotn    BENZOYL PEROXIDE CLEANSER    170.3 g    Externally apply topically daily as needed    Hydradenitis       chlorhexidine 4 % liquid    HIBICLENS    236 mL    Apply topically daily as needed for wound care    Hydradenitis       clindamycin 1 % solution    CLEOCIN T    60 mL     Apply topically 2 times daily    Hydradenitis       Clindamycin Phos-benzoyl Perox 1.2-2.5 % Gel    ACANYA    50 g    Externally apply 0.5 inches topically daily Apply a pea sized amount to each affected area of the face    Hydradenitis       doxylamine 25 MG Tabs tablet    UNISOM    14 each    Take 1 tablet (25 mg) by mouth At Bedtime    Nausea and vomiting of pregnancy, antepartum       ferrous sulfate 325 (65 Fe) MG tablet    IRON    60 tablet    Take 1 tablet (325 mg) by mouth 2 times daily    Iron deficiency anemia, unspecified iron deficiency anemia type       OLANZapine zydis 5 MG ODT tab    zyPREXA    30 tablet    Take 1 tablet (5 mg) by mouth At Bedtime 1.2 tablet (2.5 mg) x 5 days and then 1 tablet (5 mg) x 5 days and then 2 tablets daily (10 mg).    Severe episode of recurrent major depressive disorder, with psychotic features (H)       ondansetron 4 MG ODT tab    ZOFRAN ODT    20 tablet    Take 1-2 tablets (4-8 mg) by mouth every 8 hours as needed for nausea    Nausea and vomiting in pregnancy prior to 22 weeks gestation       prenatal multivitamin plus iron 27-0.8 MG Tabs per tablet      Take 1 tablet by mouth daily        pyridOXINE 50 MG tablet    [employer] VITAMIN B-6    90 tablet    Take 1 tablet (50 mg) by mouth 2 times daily as needed    Nausea and vomiting of pregnancy, antepartum       sertraline 25 MG tablet    ZOLOFT    30 tablet    Take 1 tablet (25 mg) by mouth daily    Severe episode of recurrent major depressive disorder, with psychotic features (H)

## 2018-08-14 ASSESSMENT — PATIENT HEALTH QUESTIONNAIRE - PHQ9: SUM OF ALL RESPONSES TO PHQ QUESTIONS 1-9: 15

## 2018-08-17 NOTE — PROGRESS NOTES
Clinic Care Coordination Contact  Care Team Conversations    SW was asked by CNJULIO to meet with patient.     Patient is in need pf psychiatry, patient has missed appointments.     [facility] Patient is scheduled for 09/06, 09/13, and 10/13.    SW reviewed with patient [facility] care in [place] if patient needs to be seen by psychiatry sooner.     Plan: SW will continue to follow and assist.

## 2018-08-21 ENCOUNTER — HOSPITAL ENCOUNTER (OUTPATIENT)
Dept: OBGYN | Facility: HOSPITAL | Age: 23
Discharge: HOME | End: 2018-08-21
Admitting: FAMILY MEDICINE

## 2018-08-21 ENCOUNTER — TELEPHONE (OUTPATIENT)
Dept: MIDWIFE SERVICES | Facility: CLINIC | Age: 23
End: 2018-08-21

## 2018-08-21 DIAGNOSIS — Z34.92 SUPERVISION OF NORMAL PREGNANCY IN SECOND TRIMESTER: Primary | ICD-10-CM

## 2018-08-21 DIAGNOSIS — D50.9 IRON DEFICIENCY ANEMIA, UNSPECIFIED IRON DEFICIENCY ANEMIA TYPE: ICD-10-CM

## 2018-08-21 LAB
ALBUMIN UR-MCNC: ABNORMAL MG/DL
APPEARANCE UR: CLEAR
BACTERIA #/AREA URNS HPF: ABNORMAL HPF
BILIRUB UR QL STRIP: NEGATIVE
CLUE CELLS: NORMAL
COLOR UR AUTO: YELLOW
GLUCOSE UR STRIP-MCNC: NEGATIVE MG/DL
HGB UR QL STRIP: NEGATIVE
KETONES UR STRIP-MCNC: ABNORMAL MG/DL
LEUKOCYTE ESTERASE UR QL STRIP: NEGATIVE
MUCOUS THREADS #/AREA URNS LPF: ABNORMAL LPF
NITRATE UR QL: NEGATIVE
PH UR STRIP: 8 [PH] (ref 4.5–8)
RBC #/AREA URNS AUTO: ABNORMAL HPF
RUPTURE OF FETAL MEMBRANES BY ROM PLUS: NEGATIVE
SP GR UR STRIP: 1.02 (ref 1–1.03)
SQUAMOUS #/AREA URNS AUTO: ABNORMAL LPF
TRICHOMONAS, WET PREP: NORMAL
UROBILINOGEN UR STRIP-ACNC: ABNORMAL
WBC #/AREA URNS AUTO: ABNORMAL HPF
YEAST, WET PREP: NORMAL

## 2018-08-21 RX ORDER — PRENATAL VIT/IRON FUM/FOLIC AC 27MG-0.8MG
1 TABLET ORAL DAILY
Qty: 100 TABLET | Refills: 1 | Status: SHIPPED | OUTPATIENT
Start: 2018-08-21 | End: 2019-11-22

## 2018-08-21 RX ORDER — FERROUS SULFATE 325(65) MG
325 TABLET ORAL 2 TIMES DAILY
Qty: 60 TABLET | Refills: 2 | Status: ON HOLD | OUTPATIENT
Start: 2018-08-21 | End: 2018-11-30

## 2018-08-24 ENCOUNTER — TELEPHONE (OUTPATIENT)
Dept: MIDWIFE SERVICES | Facility: CLINIC | Age: 23
End: 2018-08-24

## 2018-08-24 NOTE — TELEPHONE ENCOUNTER
----- Message from ANGELA Yao CNM sent at 8/22/2018  6:11 PM CDT -----  Hello,   Can you please call this patient and help her get an appointment set up. She should be followed closely for her mental health.   Thanks! Seema

## 2018-08-24 NOTE — TELEPHONE ENCOUNTER
LMTC to schedule prenatal visit for next week.  Also check to see if pt picked up her prescriptions from the pharmacy this week.  They were resent to a different pharmacy per pt request.  Kailey Wynne RN

## 2018-09-03 ENCOUNTER — TELEPHONE (OUTPATIENT)
Dept: OBGYN | Facility: CLINIC | Age: 23
End: 2018-09-03

## 2018-09-03 DIAGNOSIS — R44.3 HALLUCINATIONS: Primary | ICD-10-CM

## 2018-09-03 RX ORDER — HALOPERIDOL 2 MG/1
2 TABLET ORAL AT BEDTIME
Qty: 3 TABLET | Refills: 0 | Status: SHIPPED | OUTPATIENT
Start: 2018-09-03 | End: 2018-09-06

## 2018-09-03 NOTE — TELEPHONE ENCOUNTER
Messaged the [facility] Psychiatry team at [facility] for advice on Sharmayne and to try and get her in quickly to be seen. Overall Sharmayne is functioning but really struggling with depression and hallucinations. We requested to expedite things for Sharmayne since she is struggling and the single parent for her two children and entering the third trimester with her third child. We want to get her hallucinations and depression stable prior to her delivery. Their plan is to see her Sept 6th for a full visit instead of just an evaluation as scheduled previously. They did a chart review for her and suggested taking Haldol for the next few nights prior to her being seen to see if that is an option for treatment for her (see their message below for details). Phone call to patient to discuss this option with her. She feels like she is still struggling. She is weaning down on her Zoloft. Down to 25mg now and plans to stop taking it. Did not like the way it worked for her. She was planning to try and take it daily until her appointment so she has something to help her through. She switched from taking it at nighttime to the morning to see if that would help with her dreams and wetting herself. We discussed today she can stop the Zoloft and start the Haldol instead for the next 3 days until she is able to see the psychiatrist. She likes this plan and is happy to try anything. She has no other questions or concerns. Discussed making sure she is not working or having to help with her children the first time she takes this in case of an adverse reaction. We discussed sometimes this can make people feel really tired. We will start with the lower dose 2 mg and they can adjust as needed from there after her appointment. We discussed her appointment and where it was. She reports she knows where she is going and is planning to go to the appointment.   Sharmayne mentioned with everything going on she gave her two weeks notice to her job.  "She reports this will make it easier to get her to her appointments. She plans to start school, semester starts Sept. 10th. No other questions or concerns at this time. She was not at home during our discussion and has a list of questions at home she wanted to ask. Discussed calling back when she is home for those questions. Will send a message to triage to help her get an appointment set up for next week to evaluate how things are going after her appointment.   Shira Holly [facility]     \"Hi Shira, sorry to hear that Sharmayne is having such a hard time. I understand that it would be ideal to get something started as soon as possible. At this time, the simplest option to treat the symptoms that have been mentioned would be Haldol. If you could start her with 2-4mg at bedtime, this would give her a chance to try a few doses before we see her and potentially gauge for benefit, as it should work relatively quickly, and is generally recognized as safe in pregnancy.\"   "

## 2018-09-06 ENCOUNTER — OFFICE VISIT (OUTPATIENT)
Dept: PSYCHIATRY | Facility: CLINIC | Age: 23
End: 2018-09-06
Attending: PSYCHIATRY & NEUROLOGY
Payer: COMMERCIAL

## 2018-09-06 VITALS
HEART RATE: 101 BPM | SYSTOLIC BLOOD PRESSURE: 99 MMHG | BODY MASS INDEX: 24.2 KG/M2 | DIASTOLIC BLOOD PRESSURE: 64 MMHG | WEIGHT: 136.6 LBS

## 2018-09-06 DIAGNOSIS — F33.3 SEVERE EPISODE OF RECURRENT MAJOR DEPRESSIVE DISORDER, WITH PSYCHOTIC FEATURES (H): Primary | ICD-10-CM

## 2018-09-06 PROCEDURE — G0463 HOSPITAL OUTPT CLINIC VISIT: HCPCS | Mod: ZF

## 2018-09-06 RX ORDER — OLANZAPINE 5 MG/1
5 TABLET ORAL AT BEDTIME
Qty: 30 TABLET | Refills: 0 | Status: SHIPPED | OUTPATIENT
Start: 2018-09-06 | End: 2018-10-11 | Stop reason: SINTOL

## 2018-09-06 ASSESSMENT — PAIN SCALES - GENERAL: PAINLEVEL: NO PAIN (0)
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
## 2018-09-06 NOTE — MR AVS SNAPSHOT
After Visit Summary   9/6/2018    Sharmayne Morgan    MRN: [id number]           Patient Information     Date Of Birth          1995        Visit Information        Provider Department      9/6/2018 9:00 AM Emanuel Finch MD [facility]        Today's Diagnoses     Severe episode of recurrent major depressive disorder, with psychotic features (H)    -  1       Follow-ups after your visit        Follow-up notes from your care team     Return in about 4 weeks (around 10/4/2018).      Your next 10 appointments already scheduled     Oct 11, 2018  8:00 AM CDT   Women's Well-Being Follow Up with Shannen Hernadez MD   Psychiatry Clinic ([facility])    [address]  [address] [address] [phone]              Who to contact     Please call your clinic at [phone] to:    Ask questions about your health    Make or cancel appointments    Discuss your medicines    Learn about your test results    Speak to your doctor            Additional Information About Your Visit        Care EveryWhere ID     This is your Care EveryWhere ID. This could be used by other organizations to access your [place] medical records  [id number]        Your Vitals Were     Pulse Last Period BMI (Body Mass Index)             101 03/05/2018 (Exact Date) 24.2 kg/m2          Blood Pressure from Last 3 Encounters:   10/05/18 113/64   09/06/18 99/64   08/13/18 97/62    Weight from Last 3 Encounters:   10/05/18 65.8 kg (145 lb)   09/06/18 62 kg (136 lb 9.6 oz)   08/13/18 59 kg (130 lb)              Today, you had the following     No orders found for display         Today's Medication Changes          These changes are accurate as of 9/6/18 11:59 PM.  If you have any questions, ask your nurse or doctor.               Start taking these medicines.        Dose/Directions    OLANZapine 5 MG tablet   Commonly known as:  zyPREXA   Used for:  Severe episode of recurrent  major depressive disorder, with psychotic features (H)   Replaces:  OLANZapine zydis 5 MG ODT tab   Started by:  Emanuel Finch MD        Dose:  5 mg   Take 1 tablet (5 mg) by mouth At Bedtime   Quantity:  30 tablet   Refills:  0         Stop taking these medicines if you haven't already. Please contact your care team if you have questions.     haloperidol 2 MG tablet   Commonly known as:  HALDOL   Stopped by:  Emanuel Finch MD           OLANZapine zydis 5 MG ODT tab   Commonly known as:  zyPREXA   Replaced by:  OLANZapine 5 MG tablet   Stopped by:  Emanuel Finch MD                Where to get your medicines      These medications were sent to [facility] [address]  [address] [address]     Phone:  [phone]     OLANZapine 5 MG tablet                Primary Care Provider Office Phone # Fax #    [facility] [phone]       [address] [place] [address]        Goals        General    Mental Health Management (pt-stated)     Notes - Note created  8/17/2018  3:09 PM by Nancy Navas LSW    Goal Statement: I will attend my appointments to help my physical and mental well being  Measure of Success: Patient will attend appointments  Supportive Steps to Achieve: SW will assist patient.  Barriers: Patient will not remember  Strengths: Motivated to chagne  Date to Achieve By: ongoing  Patient expressed understanding of goal: yes          Equal Access to Services     TRISTON [place] AH: Hadshayan Ley, waaxda luqadaha, qaybta kaalmada andreida, oralia onofre . So [facility] [phone].    ATENCIÓN: Si habla español, tiene a horn disposición servicios gratuitos de asistencia lingüística. Marni al [phone].    We comply with applicable federal civil rights laws and [place] laws. We do not discriminate on the basis of race, color, national origin, age, disability, sex,  sexual orientation, or gender identity.            Thank you!     Thank you for choosing PS[facility]IATRY CLINIC  for your care. Our goal is always to provide you with excellent care. Hearing back from our patients is one way we can continue to improve our services. Please take a few minutes to complete the written survey that you may receive in the mail after your visit with us. Thank you!             Your Updated Medication List - Protect others around you: Learn how to safely use, store and throw away your medicines at www.disposemymeds.org.          This list is accurate as of 9/6/18 11:59 PM.  Always use your most recent med list.                   Brand Name Dispense Instructions for use Diagnosis    Benzoyl Peroxide 6 % Lotn    BENZOYL PEROXIDE CLEANSER    170.3 g    Externally apply topically daily as needed    Hydradenitis       chlorhexidine 4 % liquid    HIBICLENS    236 mL    Apply topically daily as needed for wound care    Hydradenitis       clindamycin 1 % solution    CLEOCIN T    60 mL    Apply topically 2 times daily    Hydradenitis       Clindamycin Phos-benzoyl Perox 1.2-2.5 % Gel    ACANYA    50 g    Externally apply 0.5 inches topically daily Apply a pea sized amount to each affected area of the face    Hydradenitis       doxylamine 25 MG Tabs tablet    UNISOM    14 each    Take 1 tablet (25 mg) by mouth At Bedtime    Nausea and vomiting of pregnancy, antepartum       ferrous sulfate 325 (65 Fe) MG tablet    IRON    60 tablet    Take 1 tablet (325 mg) by mouth 2 times daily    Iron deficiency anemia, unspecified iron deficiency anemia type       OLANZapine 5 MG tablet    zyPREXA    30 tablet    Take 1 tablet (5 mg) by mouth At Bedtime    Severe episode of recurrent major depressive disorder, with psychotic features (H)       ondansetron 4 MG ODT tab    ZOFRAN ODT    20 tablet    Take 1-2 tablets (4-8 mg) by mouth every 8 hours as needed for nausea    Nausea and vomiting in pregnancy prior to 22 weeks  gestation       prenatal multivitamin plus iron 27-0.8 MG Tabs per tablet     100 tablet    Take 1 tablet by mouth daily    Supervision of normal pregnancy in second trimester       pyridOXINE 50 MG tablet    CVS VITAMIN B-6    90 tablet    Take 1 tablet (50 mg) by mouth 2 times daily as needed    Nausea and vomiting of pregnancy, antepartum       sertraline 25 MG tablet    ZOLOFT    30 tablet    Take 1 tablet (25 mg) by mouth daily    Severe episode of recurrent major depressive disorder, with psychotic features (H)

## 2018-09-06 NOTE — NURSING NOTE
Chief Complaint   Patient presents with     Eval/Assessment     Mood disorder of depressed type

## 2018-09-07 ASSESSMENT — PATIENT HEALTH QUESTIONNAIRE - PHQ9: SUM OF ALL RESPONSES TO PHQ QUESTIONS 1-9: 22

## 2018-09-24 ENCOUNTER — TELEPHONE (OUTPATIENT)
Dept: MIDWIFE SERVICES | Facility: CLINIC | Age: 23
End: 2018-09-24

## 2018-09-24 NOTE — TELEPHONE ENCOUNTER
Left message on voice mail to have patient call and get set up with an appointment for her prenatal care.

## 2018-09-24 NOTE — TELEPHONE ENCOUNTER
----- Message from Liz New RN sent at 9/24/2018  1:58 PM CDT -----      ----- Message -----     From: Shira Holly APRN CNM     Sent: 9/24/2018   1:53 PM       To: Rd Triage Pod B    Hello,   Can you call this patient and help her get an appointment set up with us. She needs to be seen for prenatal care.   Thanks! Seema

## 2018-10-02 ENCOUNTER — TRANSFERRED RECORDS (OUTPATIENT)
Dept: HEALTH INFORMATION MANAGEMENT | Facility: CLINIC | Age: 23
End: 2018-10-02

## 2018-10-05 ENCOUNTER — PRENATAL OFFICE VISIT (OUTPATIENT)
Dept: MIDWIFE SERVICES | Facility: CLINIC | Age: 23
End: 2018-10-05
Payer: COMMERCIAL

## 2018-10-05 VITALS
WEIGHT: 145 LBS | TEMPERATURE: 97.8 F | SYSTOLIC BLOOD PRESSURE: 113 MMHG | BODY MASS INDEX: 25.69 KG/M2 | DIASTOLIC BLOOD PRESSURE: 64 MMHG | HEART RATE: 108 BPM

## 2018-10-05 DIAGNOSIS — K08.89 PAIN, DENTAL: ICD-10-CM

## 2018-10-05 DIAGNOSIS — F32.89 OTHER DEPRESSION: ICD-10-CM

## 2018-10-05 DIAGNOSIS — Z34.83 ENCOUNTER FOR SUPERVISION OF OTHER NORMAL PREGNANCY IN THIRD TRIMESTER: Primary | ICD-10-CM

## 2018-10-05 DIAGNOSIS — Z23 NEED FOR VACCINATION: ICD-10-CM

## 2018-10-05 PROCEDURE — 90471 IMMUNIZATION ADMIN: CPT | Performed by: ADVANCED PRACTICE MIDWIFE

## 2018-10-05 PROCEDURE — 99207 ZZC PRENATAL VISIT: CPT | Performed by: ADVANCED PRACTICE MIDWIFE

## 2018-10-05 PROCEDURE — 90715 TDAP VACCINE 7 YRS/> IM: CPT | Performed by: ADVANCED PRACTICE MIDWIFE

## 2018-10-05 NOTE — MR AVS SNAPSHOT
After Visit Summary   10/5/2018    Sharmayne Morgan    MRN: [id number]           Patient Information     Date Of Birth          1995        Visit Information        Provider Department      10/5/2018 2:15 PM [facility]        Today's Diagnoses     Encounter for supervision of other normal pregnancy in third trimester    -  1    Need for vaccination        Pain, dental        Other depression           Follow-ups after your visit        Additional Services     DENTAL REFERRAL       Your provider has referred you to: [facility]: Dental Clinic (Adult) - [place] [phone]   [email or web address]    Type: dental check up and dental pain   Urgency: Routine  Area: bilateral upper and lower      Please be aware that coverage of these services is subject to the terms and limitations of your health insurance plan.  Call member services at your health plan with any benefit or coverage questions.      Please bring the following with you to your appointment:    (1) Any X-Rays, CTs or MRIs which have been performed.  Contact the facility where they were done to arrange for  prior to your scheduled appointment.  Any new CT, MRI or other procedures ordered by your specialist must be performed at a [place] facility or coordinated by your clinic's referral office.    (2) List of current medications   (3) This referral request   (4) Any documents/labs given to you for this referral            MENTAL HEALTH REFERRAL  - Adult; Outpatient Treatment; Individual/Couples/Family/Group Therapy/Health Psychology; [facility] [facility] [phone]; We will contact you to schedule the appointment or please call with any questions       All scheduling is subject to the client's specific insurance plan & benefits, provider/location availability, and provider clinical specialities.  Please arrive 15 minutes early for your first  appointment and bring your completed paperwork.    Please be aware that coverage of these services is subject to the terms and limitations of your health insurance plan.  Call member services at your health plan with any benefit or coverage questions.                            Your next 10 appointments already scheduled     Oct 11, 2018  8:00 AM CDT   Women's Well-Being Follow Up with Shannen Hernadez MD   Psychiatry Clinic ([facility])    [address] [address] [phone]              Who to contact     If you have questions or need follow up information about today's clinic visit or your schedule please contact [facility] directly at [phone].  Normal or non-critical lab and imaging results will be communicated to you by MyChart, letter or phone within 4 business days after the clinic has received the results. If you do not hear from us within 7 days, please contact the clinic through MyChart or phone. If you have a critical or abnormal lab result, we will notify you by phone as soon as possible.  Submit refill requests through [employer] or call your pharmacy and they will forward the refill request to us. Please allow 3 business days for your refill to be completed.          Additional Information About Your Visit        Care EveryWhere ID     This is your Care EveryWhere ID. This could be used by other organizations to access your [facility] medical records  [id number]        Your Vitals Were     Pulse Temperature Last Period BMI (Body Mass Index)          108 97.8  F (36.6  C) (Oral) 03/05/2018 (Exact Date) 25.69 kg/m2         Blood Pressure from Last 3 Encounters:   10/05/18 113/64   08/13/18 97/62   07/10/18 102/67    Weight from Last 3 Encounters:   10/05/18 145 lb (65.8 kg)   08/13/18 130 lb (59 kg)   07/10/18 122 lb (55.3 kg)              We Performed the Following     1st  Administration  [06861]     DENTAL  REFERRAL     MENTAL HEALTH REFERRAL  - Adult; Outpatient Treatment; Individual/Couples/Family/Group Therapy/Health Psychology; [facility]: [facility] [phone]; We will contact you to schedule the appointment or please call with any questions     TDAP VACCINE (ADACEL) [06245.002]        Primary Care Provider Office Phone # Fax #    [facility] [phone]       [address] [place] [address]        Goals        General    Mental Health Management (pt-stated)     Notes - Note created  8/17/2018  3:09 PM by Nancy Navas LSW    Goal Statement: I will attend my appointments to help my physical and mental well being  Measure of Success: Patient will attend appointments  Supportive Steps to Achieve: SW will assist patient.  Barriers: Patient will not remember  Strengths: Motivated to chagne  Date to Achieve By: ongoing  Patient expressed understanding of goal: yes          Equal Access to Services     [place]ON [place] : Donny Ley, jonathan sadler, oralia enriquez . So [facility] [phone].    ATENCIÓN: Si habla español, tiene a horn disposición servicios gratuitos de asistencia lingüística. Marni al [phone].    We comply with applicable federal civil rights laws and [place] laws. We do not discriminate on the basis of race, color, national origin, age, disability, sex, sexual orientation, or gender identity.            Thank you!     Thank you for choosing [facility]  for your care. Our goal is always to provide you with excellent care. Hearing back from our patients is one way we can continue to improve our services. Please take a few minutes to complete the written survey that you may receive in the mail after your visit with us. Thank you!             Your Updated Medication List - Protect others around you: Learn how to safely use, store and throw away your medicines at  www.disposemymeds.org.          This list is accurate as of 10/5/18  3:12 PM.  Always use your most recent med list.                   Brand Name Dispense Instructions for use Diagnosis    Benzoyl Peroxide 6 % Lotn    BENZOYL PEROXIDE CLEANSER    170.3 g    Externally apply topically daily as needed    Hydradenitis       chlorhexidine 4 % liquid    HIBICLENS    236 mL    Apply topically daily as needed for wound care    Hydradenitis       clindamycin 1 % solution    CLEOCIN T    60 mL    Apply topically 2 times daily    Hydradenitis       Clindamycin Phos-benzoyl Perox 1.2-2.5 % Gel    ACANYA    50 g    Externally apply 0.5 inches topically daily Apply a pea sized amount to each affected area of the face    Hydradenitis       doxylamine 25 MG Tabs tablet    UNISOM    14 each    Take 1 tablet (25 mg) by mouth At Bedtime    Nausea and vomiting of pregnancy, antepartum       ferrous sulfate 325 (65 Fe) MG tablet    IRON    60 tablet    Take 1 tablet (325 mg) by mouth 2 times daily    Iron deficiency anemia, unspecified iron deficiency anemia type       OLANZapine 5 MG tablet    zyPREXA    30 tablet    Take 1 tablet (5 mg) by mouth At Bedtime    Severe episode of recurrent major depressive disorder, with psychotic features (H), Hallucinations       ondansetron 4 MG ODT tab    ZOFRAN ODT    20 tablet    Take 1-2 tablets (4-8 mg) by mouth every 8 hours as needed for nausea    Nausea and vomiting in pregnancy prior to 22 weeks gestation       prenatal multivitamin plus iron 27-0.8 MG Tabs per tablet     100 tablet    Take 1 tablet by mouth daily    Supervision of normal pregnancy in second trimester       pyridOXINE 50 MG tablet    [employer] VITAMIN B-6    90 tablet    Take 1 tablet (50 mg) by mouth 2 times daily as needed    Nausea and vomiting of pregnancy, antepartum       sertraline 25 MG tablet    ZOLOFT    30 tablet    Take 1 tablet (25 mg) by mouth daily    Severe episode of recurrent major depressive disorder, with  psychotic features (H)

## 2018-10-05 NOTE — PROGRESS NOTES
31w4d  Patient feeling well. Positive fetal movement. Denies water leaking, vaginal bleeding, decreased fetal movement, contraction pain, or headaches.   Doing well overall but feeling a little more depressed and tired since starting the medications. She reports not coming to her appointments because she did not feel up to it. Taking Zoloft and Zyprexa. Going to follow up with psych on 10/11. Will ask them about changing her medications to find something that is in between. She went from not sleeping to feeling like she needs to sleep too much. She is also open to therapy. Referral given.   She is asking about a dental referral as well. She has a lot of pain in her mouth. Referral given. No other questions or concerns today. Here with partner today.   Danger signs reviewed, pre-eclampsia signs and symptoms discussed.   Knows when to call triage and has phone numbers.   Follow up in 2 weeks.   Shira Holly CNM

## 2018-10-11 ENCOUNTER — OFFICE VISIT (OUTPATIENT)
Dept: PSYCHIATRY | Facility: CLINIC | Age: 23
End: 2018-10-11
Attending: PSYCHIATRY & NEUROLOGY
Payer: COMMERCIAL

## 2018-10-11 VITALS
BODY MASS INDEX: 26.5 KG/M2 | WEIGHT: 149.6 LBS | HEART RATE: 59 BPM | DIASTOLIC BLOOD PRESSURE: 73 MMHG | SYSTOLIC BLOOD PRESSURE: 118 MMHG

## 2018-10-11 DIAGNOSIS — F33.3 SEVERE EPISODE OF RECURRENT MAJOR DEPRESSIVE DISORDER, WITH PSYCHOTIC FEATURES (H): Primary | ICD-10-CM

## 2018-10-11 DIAGNOSIS — F41.9 ANXIETY DISORDER, UNSPECIFIED TYPE: ICD-10-CM

## 2018-10-11 PROCEDURE — G0463 HOSPITAL OUTPT CLINIC VISIT: HCPCS | Mod: ZF

## 2018-10-11 RX ORDER — QUETIAPINE FUMARATE 25 MG/1
TABLET, FILM COATED ORAL
Qty: 90 TABLET | Refills: 0 | Status: ON HOLD | OUTPATIENT
Start: 2018-10-11 | End: 2018-11-30

## 2018-10-11 ASSESSMENT — PAIN SCALES - GENERAL: PAINLEVEL: NO PAIN (0)

## 2018-10-11 NOTE — MR AVS SNAPSHOT
After Visit Summary   10/11/2018    Sharmayne Morgan    MRN: [id number]           Patient Information     Date Of Birth          1995        Visit Information        Provider Department      10/11/2018 9:15 AM Emanuel Finch MD Psychiatry Clinic        Today's Diagnoses     Severe episode of recurrent major depressive disorder, with psychotic features (H)    -  1    Anxiety disorder, unspecified type           Follow-ups after your visit        Follow-up notes from your care team     Return in about 4 weeks (around 2018).      Who to contact     Please call your clinic at [phone] to:    Ask questions about your health    Make or cancel appointments    Discuss your medicines    Learn about your test results    Speak to your doctor            Additional Information About Your Visit        MyChart Information     [employer] is an electronic gateway that provides easy, online access to your medical records. With [employer], you can request a clinic appointment, read your test results, renew a prescription or communicate with your care team.     To sign up for [employer] visit the website at [email or web address]   You will be asked to enter the access code listed below, as well as some personal information. Please follow the directions to create your username and password.     Your access code is: [email or web address]  Expires: 2019  5:03 PM     Your access code will  in 90 days. If you need help or a new code, please contact your [facility] or call [phone] for assistance.        Care EveryWhere ID     This is your Care EveryWhere ID. This could be used by other organizations to access your [facility] medical records  [id number]        Your Vitals Were     Pulse Last Period BMI (Body Mass Index)             59 2018 (Exact Date) 26.5 kg/m2          Blood Pressure from Last 3 Encounters:   10/11/18 118/73   10/05/18 113/64   18 99/64    Weight  from Last 3 Encounters:   10/11/18 67.9 kg (149 lb 9.6 oz)   10/05/18 65.8 kg (145 lb)   09/06/18 62 kg (136 lb 9.6 oz)              Today, you had the following     No orders found for display         Today's Medication Changes          These changes are accurate as of 10/11/18  5:03 PM.  If you have any questions, ask your nurse or doctor.               Start taking these medicines.        Dose/Directions    QUEtiapine 25 MG tablet   Commonly known as:  SEROQUEL   Used for:  Severe episode of recurrent major depressive disorder, with psychotic features (H), Anxiety disorder, unspecified type   Started by:  Emanuel Finch MD        Take 25mg at bedtime, then increase by 25mg each week to a maximum dose of 100mg at bedtime.   Quantity:  90 tablet   Refills:  0         Stop taking these medicines if you haven't already. Please contact your care team if you have questions.     OLANZapine 5 MG tablet   Commonly known as:  zyPREXA   Stopped by:  Emanuel Finch MD                Where to get your medicines      These medications were sent to [facility] [address]  [address] [address]     Phone:  [phone]     QUEtiapine 25 MG tablet                Primary Care Provider Office Phone # Fax #    [facility] [phone]       [address] [place] [address]        Goals        General    Mental Health Management (pt-stated)     Notes - Note created  8/17/2018  3:09 PM by Nancy Navas LSW    Goal Statement: I will attend my appointments to help my physical and mental well being  Measure of Success: Patient will attend appointments  Supportive Steps to Achieve: SW will assist patient.  Barriers: Patient will not remember  Strengths: Motivated to chagne  Date to Achieve By: ongoing  Patient expressed understanding of goal: yes          Equal Access to Services     TRISTON [place]LE AH: Donny Ley,  wamunada paulyadaha, qaybta kaalphilip agudelo, oralia kuhnaamichael ah. So [facility] [phone].    ATENCIÓN: Si erin sorto, tiene a horn disposición servicios gratuitos de asistencia lingüística. Marni al [phone].    We comply with applicable federal civil rights laws and [place] laws. We do not discriminate on the basis of race, color, national origin, age, disability, sex, sexual orientation, or gender identity.            Thank you!     Thank you for choosing PSYCHIATRY CLINIC  for your care. Our goal is always to provide you with excellent care. Hearing back from our patients is one way we can continue to improve our services. Please take a few minutes to complete the written survey that you may receive in the mail after your visit with us. Thank you!             Your Updated Medication List - Protect others around you: Learn how to safely use, store and throw away your medicines at www.disposemymeds.org.          This list is accurate as of 10/11/18  5:03 PM.  Always use your most recent med list.                   Brand Name Dispense Instructions for use Diagnosis    Benzoyl Peroxide 6 % Lotn    BENZOYL PEROXIDE CLEANSER    170.3 g    Externally apply topically daily as needed    Hydradenitis       chlorhexidine 4 % liquid    HIBICLENS    236 mL    Apply topically daily as needed for wound care    Hydradenitis       clindamycin 1 % solution    CLEOCIN T    60 mL    Apply topically 2 times daily    Hydradenitis       Clindamycin Phos-benzoyl Perox 1.2-2.5 % Gel    ACANYA    50 g    Externally apply 0.5 inches topically daily Apply a pea sized amount to each affected area of the face    Hydradenitis       doxylamine 25 MG Tabs tablet    UNISOM    14 each    Take 1 tablet (25 mg) by mouth At Bedtime    Nausea and vomiting of pregnancy, antepartum       ferrous sulfate 325 (65 Fe) MG tablet    IRON    60 tablet    Take 1 tablet (325 mg) by mouth 2 times daily    Iron deficiency anemia,  unspecified iron deficiency anemia type       ondansetron 4 MG ODT tab    ZOFRAN ODT    20 tablet    Take 1-2 tablets (4-8 mg) by mouth every 8 hours as needed for nausea    Nausea and vomiting in pregnancy prior to 22 weeks gestation       prenatal multivitamin plus iron 27-0.8 MG Tabs per tablet     100 tablet    Take 1 tablet by mouth daily    Supervision of normal pregnancy in second trimester       pyridOXINE 50 MG tablet    [employer]S VITAMIN B-6    90 tablet    Take 1 tablet (50 mg) by mouth 2 times daily as needed    Nausea and vomiting of pregnancy, antepartum       QUEtiapine 25 MG tablet    SEROQUEL    90 tablet    Take 25mg at bedtime, then increase by 25mg each week to a maximum dose of 100mg at bedtime.    Severe episode of recurrent major depressive disorder, with psychotic features (H), Anxiety disorder, unspecified type       sertraline 25 MG tablet    ZOLOFT    30 tablet    Take 1 tablet (25 mg) by mouth daily    Severe episode of recurrent major depressive disorder, with psychotic features (H)

## 2018-10-11 NOTE — PROGRESS NOTES
Psychiatry Clinic Medical Progress Note   Sharmayne Morgan is a 23 year old female who was referred for consultation by Silvio Montes for evaluation of psychosis on 09/06/18.   Will plan to engage in brief care with one follow up, with plan to transition back to the referring provider or a designated prescriber on completion of brief care.   Psych critical item history includes intrusive thoughts.    Interim History   History was provided by patient who was a good historian.   32 weeks currently. Mornings work best for potential therapy, although has not got established yet.   Has been feeling groggy. She does feel that she has been sleeping more, but feels a lot more tired during the day. Feels like if she slows down she might fall asleep. She still wakes up at night due to GERD in pregnancy.   Still has pretty frequent episodes of hyperventilation, maybe a few times per day.   Overall, she still feels stable, but things have not gotten better, and may be a little bit harder due to sedation.     Discussion points from previous appointments:   Has had significant decreased appetite for a long time, used to have a lot of nausea, but even without nausea, decreased appetite is an issue. Notes that she generally only eats once per day due to this. Denies body image issues.   Once she goes so long without sleeping starts to hear things, feels like she is having conversations with her kids when they aren't there, sees shadows.   Focus / concentration are very difficult, often doesn't remember things, can't manage without a calendar.   Does sometimes have panic when she thinks about issues with her son, notes palpitations, hyperventilation, but notes that this usually only happens in triggered situations, maybe ~10x per month.   One of the most distressing things that she experiences is intrusive dreams where she dreams that she somehow ends up harming her child. These dreams are highly ego dystonic and she feels no  compulsion to act on the thoughts while awake, is very bothered by this when it happens.   Had been on 50mg of Zoloft previously, but had noted that it caused vivid dreams where she couldn't tell if she was awake or sleeping, had episodes of nocturnal enuresis. Most recently takes it in the morning instead, but doesn't note any benefit. No vivid dreams, but does sleep talk now with it.   She is so distressed by all of this that it is hard to go out and be with people anymore. She doesn't do the things that she used to enjoy, doesn't go out with people, and feels this is hard on her kids because they don't really want to be around her either.   She gets tired of being told by providers that things are normal when she feels like they aren't.     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 This has made it hard to stay in jobs. She notes that she gets fired from every job due to anxiety and having to deal with son's behavioral problems. She never got GED and would like to pursue this.   LIVING SITUATION / RELATIONSHIPS- Currently living in apartment with 2 children, son is 9 and daughter is 4. Son has been in therapy for the last 1.5 years. Father of current pregnancy is also father of 3yo, not currently reliably involved.   SOCIAL/ SPIRITUAL SUPPORT- No. Did recently get approved for a .   FEELS SAFE AT HOME- Yes     Medical Review of Systems   Dizziness, fatigue, shortness of breath have been issues even since before pregnancy. Has not had falls but has come close. Dizziness / SOB episodes can last for up to 20 minutes, laying down helps. Also gets sweaty and nauseated during these episodes.   A comprehensive  review of systems was performed and is negative other than noted in the HPI.       Psychiatric Medication Trials       Drug /  Start Date Dose (mg) Helpful Adverse Effects DC Reason / Date   Sertraline       Olanzapine 9/2018 5  sedating sfx   Klonopin   Anger/dysregulation sfx      Medical / Surgical History   CARE TEAM:   PCP- Clinic, [facility]  Therapist- Has been seeing therapists since she was 10. Most recent therapist left, this has happened multiple times and she is tired of the pattern.     Pregnant or breastfeeding:  Yes    Neurologic Hx [head injury, seizures, etc.]: None  Patient Active Problem List   Diagnosis     Dry eyes, bilateral     Hydradenitis     Hyperopia, bilateral     Severe episode of recurrent major depressive disorder, with psychotic features (H)     Need for Tdap vaccination     Supervision of other normal pregnancy, antepartum     Mood disorder of depressed type     Anemia     Past Medical History:   Diagnosis Date     Anxiety      Manic depressive disorder (H)      Severe recurrent majr depression w/psychotc features, mood-incongruent (H) 2017    Hallucinations - visual and auditory     Trauma in childhood 2005      Allergy   Review of patient's allergies indicates no known allergies.     Current Medications     Current Outpatient Prescriptions   Medication Sig Dispense Refill     Benzoyl Peroxide (BENZOYL PEROXIDE CLEANSER) 6 % LOTN Externally apply topically daily as needed 170.3 g 3     chlorhexidine (HIBICLENS) 4 % liquid Apply topically daily as needed for wound care 236 mL 3     clindamycin (CLEOCIN T) 1 % solution Apply topically 2 times daily 60 mL 11     Clindamycin Phos-benzoyl Perox (ACANYA) 1.2-2.5 % GEL Externally apply 0.5 inches topically daily Apply a pea sized amount to each affected area of the face 50 g 11     doxylamine (UNISOM) 25 MG TABS tablet Take 1 tablet (25 mg) by mouth At Bedtime 14 each 0     ferrous sulfate (IRON) 325 (65 Fe) MG tablet Take 1  "tablet (325 mg) by mouth 2 times daily 60 tablet 2     OLANZapine (ZYPREXA) 5 MG tablet Take 1 tablet (5 mg) by mouth At Bedtime 30 tablet 0     ondansetron (ZOFRAN ODT) 4 MG ODT tab Take 1-2 tablets (4-8 mg) by mouth every 8 hours as needed for nausea 20 tablet 3     Prenatal Vit-Fe Fumarate-FA (PRENATAL MULTIVITAMIN PLUS IRON) 27-0.8 MG TABS per tablet Take 1 tablet by mouth daily 100 tablet 1     pyridOXINE (CVS VITAMIN B-6) 50 MG tablet Take 1 tablet (50 mg) by mouth 2 times daily as needed 90 tablet 0     sertraline (ZOLOFT) 25 MG tablet Take 1 tablet (25 mg) by mouth daily 30 tablet 0      Vitals   /73  Pulse 59  Wt 67.9 kg (149 lb 9.6 oz)  LMP 03/05/2018 (Exact Date)  BMI 26.5 kg/m2    Pulse Readings from Last 5 Encounters:   10/11/18 59   10/05/18 108   09/06/18 101   08/13/18 93   07/10/18 86     Wt Readings from Last 5 Encounters:   10/11/18 67.9 kg (149 lb 9.6 oz)   10/05/18 65.8 kg (145 lb)   09/06/18 62 kg (136 lb 9.6 oz)   08/13/18 59 kg (130 lb)   07/10/18 55.3 kg (122 lb)     BP Readings from Last 5 Encounters:   10/11/18 118/73   10/05/18 113/64   09/06/18 99/64   08/13/18 97/62   07/10/18 102/67      Mental Status Exam   Alertness: alert  and oriented  Appearance: adequately groomed  Behavior/Demeanor: cooperative, pleasant and calm, with good eye contact   Speech: normal and regular rate and rhythm  Language: intact and no problems  Psychomotor: normal or unremarkable  Mood: \"It's been hard\"  Affect: full range and appropriate; was congruent to mood; was congruent to content  Thought Process/Associations: unremarkable  Thought Content:  Reports none;  Denies suicidal ideation, violent ideation and delusions  Perception:  Reports auditory hallucinations and visual hallucinations, as described above  Insight: intact  Judgment: intact  Cognition: does appear grossly intact; formal cognitive testing was not done  Gait and Station: unremarkable     Labs and Data     PHQ9 TODAY = 22  PHQ-9 " "SCORE 6/12/2018 8/13/2018 9/6/2018   Total Score 13 15 22      Psychiatric Diagnoses   Major depressive disorder, recurrent, severe  Psychosis NOS (r/o MDD w/ psychosis vs primary psychosis)  Anxiety disorder, unspecified     Assessment   Sharmayne Morgan is a 23 year old female who provides a history supporting the diagnoses listed directly above.   Sharmayne notes that she has struggled with anxiety for a long time and has been told that she may be \"on the verge\" of having schizophrenia like her father did. There is notably a strong genetic loading for schizophrenia, with it having been diagnosed in her father and two paternal aunts.   TODAY: Given that there has been no improvement with olanzapine, but significant sedation that has impacted her ability to function, will discontinue at this time. Seroquel may be a good alternative that has a similar safety profile in pregnancy, and may be effective for treating sleep and anxiety symptoms as well. I discussed this with Sharmayne and she is agreeable to starting at 25mg at this time and increasing by 25mg weekly as tolerated to a max of 100mg.   She notes that therapy has been very helpful for her in the past, but has had a rough run of therapists having to leave for various reasons, so she got tired of searching to find someone that would be more reliable. [facility] will contact her about getting set up with a therapist during pregnancy at this time.   A change to her primary antidepressant may be indicated, but I am hesitant to change it at the moment given that we are already making another medication change, she has found it somewhat helpful, and she had problems with it at a higher dose previously.   She was previously on Klonopin and experienced significant emotional dysregulation, which is an indication that benzos are likely not a good medication option for her in general, and certainly not advisable in pregnancy.   Risk of sarcoidosis: " Sharmayne has a strong family history of sarcoidosis, with her father and a cousin having  young from complications related to this condition (pulmonary?). She has always been understandably concerned about this, but does note that she had some kind of test for it when she was younger that was negative. However, given that we don't know what kind of test she had for it, the fact that some tests can be initially negative or falsely negative, the fact that the disease can progress or start later in life, and the family history, I do believe that it would be worth her talking to a PCP and rheumatologist more about this and what kind of monitoring might be appropriate in the future for her. Fortunately, she does not have any overt pulmonary symptoms currently, but it is worth noting that sarcoid is recognized as a rare contributor to psychosis. This is low on the differential, but given the atypical presentation of her psychosis and the relative risk factors for sarcoid in her, I believe that a more thorough evaluation may be warranted after her pregnancy.     PSYCHOTROPIC DRUG INTERACTIONS:   OLANZAPINE -- SERTRALINE -- ONDANSETRON may result in increased risk of QT-interval prolongation.     OLANZAPINE -- DOXYLAMINE may result in increased risk of CNS depression.     SERTRALINE -- ONDANSETRON may result in increased risk of serotonin syndrome.     MANAGEMENT:  Monitoring for adverse effects and routine vitals     Plan     1) MEDICATIONS:  - Discontinue olanzapine  - Start Seroquel 25mg QHS for 1 week. If tolerated, increase by 25mg per week up to a max of 100mg daily  - Continue sertraline 25mg daily    Prescribed Univsom 25mg QHS for nausea    [see the following SmartPhrase(s) for more information: PSYMEDINFOQUETIAPINE]    2) THERAPY: Psychotherapy is a primary recommendation. Contacted [facility] today for potential therapy options. Patient will be contacted regarding further options.     3)  NEXT DUE:   Labs- Routine lab monitoring is not indicated for current psychotropic medication regimen  EKG- N/A   Rating Scales- N/A    4) REFERRALS: After pregnancy, it is highly recommended that patient establish with a primary care provider for general medical workup, and would consider referral to rheumatology as well for sarcoidosis workup.     5) : None    6) RTC: 4 weeks or sooner if needed    Treatment Risk Statement:  The patient understands the risks, benefits, adverse effects and alternatives. Agrees to treatment with the capacity to do so. No medical contraindications to treatment. Agrees to call clinic for any problems. The patient understands to call 911 or go to the nearest ED if life threatening or urgent symptoms occur. Crisis numbers are provided routinely in the After Visit Summary.       PROVIDER:  Emanuel Finch MD

## 2018-10-11 NOTE — NURSING NOTE
Chief Complaint   Patient presents with     Recheck Medication     Severe episode of recurrent major depressive disorder, with psychotic features

## 2018-10-12 ASSESSMENT — PATIENT HEALTH QUESTIONNAIRE - PHQ9: SUM OF ALL RESPONSES TO PHQ QUESTIONS 1-9: 22

## 2018-10-15 ENCOUNTER — CARE COORDINATION (OUTPATIENT)
Dept: PSYCHIATRY | Facility: CLINIC | Age: 23
End: 2018-10-15

## 2018-10-15 NOTE — Clinical Note
Hey,    I've printed a copy of this letter and placed in your folder. If you want to edit it, feel free to do so an print/sign an additional copy. Otherwise, if you are good with this (short and to the point) version, feel free to sign and place back in my folder.    Thanks!  Yanique

## 2018-10-15 NOTE — PROGRESS NOTES
Received incoming phone call from pt requesting a letter from Dr. Finch authorizing pt to operate a motor vehicle for work. 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 Pt was in to see Dr. Finch last week and has discontinued the zyprexa and will be starting on seroquel at bedtime. She has not picked up the seroquel yet from the pharmacy. She takes sertraline in the AM.     Completed letter to be emailed to patient at: [email or web address] (same as in demographics)    Routing call to provider for authorization to draft letter clearing pt to drive for her position.

## 2018-10-15 NOTE — LETTER
October 15, 2018      RE: Sharmayne Morgan  [address]  [facility] [address]         To Whom it May Concern,    Sharmayne Morgan is currently under my care at the [facility]. 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       Sincerely,        Dr. Emanuel Finch MD

## 2018-10-17 ENCOUNTER — PRENATAL OFFICE VISIT (OUTPATIENT)
Dept: MIDWIFE SERVICES | Facility: CLINIC | Age: 23
End: 2018-10-17
Payer: COMMERCIAL

## 2018-10-17 VITALS
WEIGHT: 149.3 LBS | DIASTOLIC BLOOD PRESSURE: 60 MMHG | TEMPERATURE: 97.3 F | BODY MASS INDEX: 26.45 KG/M2 | SYSTOLIC BLOOD PRESSURE: 104 MMHG

## 2018-10-17 DIAGNOSIS — O99.613 CONSTIPATION IN PREGNANCY IN THIRD TRIMESTER: ICD-10-CM

## 2018-10-17 DIAGNOSIS — Z34.80 SUPERVISION OF OTHER NORMAL PREGNANCY, ANTEPARTUM: ICD-10-CM

## 2018-10-17 DIAGNOSIS — K59.00 CONSTIPATION IN PREGNANCY IN THIRD TRIMESTER: ICD-10-CM

## 2018-10-17 DIAGNOSIS — O26.893 HEARTBURN DURING PREGNANCY IN THIRD TRIMESTER: Primary | ICD-10-CM

## 2018-10-17 DIAGNOSIS — R12 HEARTBURN DURING PREGNANCY IN THIRD TRIMESTER: Primary | ICD-10-CM

## 2018-10-17 DIAGNOSIS — F33.3 SEVERE EPISODE OF RECURRENT MAJOR DEPRESSIVE DISORDER, WITH PSYCHOTIC FEATURES (H): ICD-10-CM

## 2018-10-17 PROCEDURE — 99207 ZZC PRENATAL VISIT: CPT | Performed by: ADVANCED PRACTICE MIDWIFE

## 2018-10-17 RX ORDER — ASPIRIN 81 MG
100 TABLET, DELAYED RELEASE (ENTERIC COATED) ORAL DAILY
Qty: 60 TABLET | Refills: 1 | Status: ON HOLD | OUTPATIENT
Start: 2018-10-17 | End: 2018-11-30

## 2018-10-17 RX ORDER — FAMOTIDINE 20 MG/1
20 TABLET, FILM COATED ORAL 2 TIMES DAILY
Qty: 60 TABLET | Refills: 1 | Status: ON HOLD | OUTPATIENT
Start: 2018-10-17 | End: 2018-11-30

## 2018-10-17 NOTE — PROGRESS NOTES
- Writer received signed letter from provider   - Letter scanned to patients email at [email or web address]  - Original sent to scanning, copy made and on file in psychiatry until scanning complete   - No further action needed by the writer

## 2018-10-17 NOTE — MR AVS SNAPSHOT
"              After Visit Summary   10/17/2018    Sharmayne Morgan    MRN: [id number]           Patient Information     Date Of Birth          1995        Visit Information        Provider Department      10/17/2018 5:15 PM Mariza Harper APRN CNM [facility]        Today's Diagnoses     Heartburn during pregnancy in third trimester    -  1    Constipation in pregnancy in third trimester        Supervision of other normal pregnancy, antepartum        Severe episode of recurrent major depressive disorder, with psychotic features (H)           Follow-ups after your visit        Who to contact     If you have questions or need follow up information about today's clinic visit or your schedule please contact [facility] directly at [phone].  Normal or non-critical lab and imaging results will be communicated to you by [employer], letter or phone within 4 business days after the clinic has received the results. If you do not hear from us within 7 days, please contact the clinic through [employer] or phone. If you have a critical or abnormal lab result, we will notify you by phone as soon as possible.  Submit refill requests through [employer] or call your pharmacy and they will forward the refill request to us. Please allow 3 business days for your refill to be completed.          Additional Information About Your Visit        MyChart Information     [employer] lets you send messages to your doctor, view your test results, renew your prescriptions, schedule appointments and more. To sign up, go to [email or web address] . Click on \"Log in\" on the left side of the screen, which will take you to the Welcome page. Then click on \"Sign up Now\" on the right side of the page.     You will be asked to enter the access code listed below, as well as some personal information. Please follow the directions to create your username and password.     Your access code is: [email or web address]  Expires: 1/9/2019  " 5:03 PM     Your access code will  in 90 days. If you need help or a new code, please call your [facility] clinic or [phone].        Care EveryWhere ID     This is your Care EveryWhere ID. This could be used by other organizations to access your [facility] medical records  [id number]        Your Vitals Were     Temperature Last Period BMI (Body Mass Index)             97.3  F (36.3  C) (Oral) 2018 (Exact Date) 26.45 kg/m2          Blood Pressure from Last 3 Encounters:   10/17/18 104/60   10/05/18 113/64   18 97/62    Weight from Last 3 Encounters:   10/17/18 149 lb 4.8 oz (67.7 kg)   10/05/18 145 lb (65.8 kg)   18 130 lb (59 kg)              Today, you had the following     No orders found for display         Today's Medication Changes          These changes are accurate as of 10/17/18  5:50 PM.  If you have any questions, ask your nurse or doctor.               Start taking these medicines.        Dose/Directions    docusate sodium 100 MG tablet   Commonly known as:  COLACE   Used for:  Constipation in pregnancy in third trimester   Started by:  Mariza Harper APRN CNM        Dose:  100 mg   Take 100 mg by mouth daily   Quantity:  60 tablet   Refills:  1       famotidine 20 MG tablet   Commonly known as:  PEPCID   Used for:  Heartburn during pregnancy in third trimester   Started by:  Mariza Harper APRN CNM        Dose:  20 mg   Take 1 tablet (20 mg) by mouth 2 times daily   Quantity:  60 tablet   Refills:  1            Where to get your medicines      These medications were sent to [facility] [address]  [address] [facility] [address] [address]     Phone:  [phone]     docusate sodium 100 MG tablet    famotidine 20 MG tablet                Primary Care Provider Office Phone # Fax #    [facility] [phone]       [address] [place] [address]        Goals        General    Mental  Health Management (pt-stated)     Notes - Note created  8/17/2018  3:09 PM by Nancy Navas BSW    Goal Statement: I will attend my appointments to help my physical and mental well being  Measure of Success: Patient will attend appointments  Supportive Steps to Achieve: SW will assist patient.  Barriers: Patient will not remember  Strengths: Motivated to chagne  Date to Achieve By: ongoing  Patient expressed understanding of goal: yes          Equal Access to Services     [facility]: Hadii aad ku hadasho Sovaneali, waaxda luqadaha, qaybta kaalmada adeegyada, waxay smitain hayprincen adetarik giovannimaureentracy la'aan . So [facility] [phone].    ATENCIÓN: Si habla español, tiene a horn disposición servicios gratuitos de asistencia lingüística. Jaileneame al [phone].    We comply with applicable federal civil rights laws and [place] laws. We do not discriminate on the basis of race, color, national origin, age, disability, sex, sexual orientation, or gender identity.            Thank you!     Thank you for choosing [facility]  for your care. Our goal is always to provide you with excellent care. Hearing back from our patients is one way we can continue to improve our services. Please take a few minutes to complete the written survey that you may receive in the mail after your visit with us. Thank you!             Your Updated Medication List - Protect others around you: Learn how to safely use, store and throw away your medicines at www.disposemymeds.org.          This list is accurate as of 10/17/18  5:50 PM.  Always use your most recent med list.                   Brand Name Dispense Instructions for use Diagnosis    Benzoyl Peroxide 6 % Lotn    BENZOYL PEROXIDE CLEANSER    170.3 g    Externally apply topically daily as needed    Hydradenitis       chlorhexidine 4 % liquid    HIBICLENS    236 mL    Apply topically daily as needed for wound care    Hydradenitis       clindamycin 1 % solution    CLEOCIN T    60 mL     Apply topically 2 times daily    Hydradenitis       Clindamycin Phos-benzoyl Perox 1.2-2.5 % Gel    ACANYA    50 g    Externally apply 0.5 inches topically daily Apply a pea sized amount to each affected area of the face    Hydradenitis       docusate sodium 100 MG tablet    COLACE    60 tablet    Take 100 mg by mouth daily    Constipation in pregnancy in third trimester       doxylamine 25 MG Tabs tablet    UNISOM    14 each    Take 1 tablet (25 mg) by mouth At Bedtime    Nausea and vomiting of pregnancy, antepartum       famotidine 20 MG tablet    PEPCID    60 tablet    Take 1 tablet (20 mg) by mouth 2 times daily    Heartburn during pregnancy in third trimester       ferrous sulfate 325 (65 Fe) MG tablet    IRON    60 tablet    Take 1 tablet (325 mg) by mouth 2 times daily    Iron deficiency anemia, unspecified iron deficiency anemia type       ondansetron 4 MG ODT tab    ZOFRAN ODT    20 tablet    Take 1-2 tablets (4-8 mg) by mouth every 8 hours as needed for nausea    Nausea and vomiting in pregnancy prior to 22 weeks gestation       prenatal multivitamin plus iron 27-0.8 MG Tabs per tablet     100 tablet    Take 1 tablet by mouth daily    Supervision of normal pregnancy in second trimester       pyridOXINE 50 MG tablet    CVS VITAMIN B-6    90 tablet    Take 1 tablet (50 mg) by mouth 2 times daily as needed    Nausea and vomiting of pregnancy, antepartum       QUEtiapine 25 MG tablet    SEROQUEL    90 tablet    Take 25mg at bedtime, then increase by 25mg each week to a maximum dose of 100mg at bedtime.    Severe episode of recurrent major depressive disorder, with psychotic features (H), Anxiety disorder, unspecified type       sertraline 25 MG tablet    ZOLOFT    30 tablet    Take 1 tablet (25 mg) by mouth daily    Severe episode of recurrent major depressive disorder, with psychotic features (H)

## 2018-10-17 NOTE — PROGRESS NOTES
"33w2d  Pt here with partner and young son, Pt reports increase in some swelling in feet and hands and dehydration,  that she states is because of psych meds, currently weaning off of zyprexa and starting next week on seraquil, taking zoloft daily.  Pt states doing well mentally for now.  Discussed at length that should be up and down with emotions for 2 weeks postpartum but if feeling like she is hearing or seeing hallucinations she should tell someone sooner.  Will see patient at 2 and 6 weeks postpartum.  Pt reports heartburn even after taking lots of Tums, pt states mostly at night and is vomiting when she lays down, will give prescription for pepcid.   Pt also reports constipation, states only bowel movements 1-2 per week and is painful.  Has tried metamucil and \"I will not do that again' requesting stool softeners, will give colace today.  Also given herbal handouts on constipation and heartburn and enc to try dietary changes.   rtc in 2 weeks for GBS, hgb, PHQ 9 AND GAD7 next visit.  mrb  "

## 2018-10-18 ENCOUNTER — TELEPHONE (OUTPATIENT)
Dept: PSYCHIATRY | Facility: CLINIC | Age: 23
End: 2018-10-18

## 2018-10-30 ENCOUNTER — PRENATAL OFFICE VISIT (OUTPATIENT)
Dept: MIDWIFE SERVICES | Facility: CLINIC | Age: 23
End: 2018-10-30
Payer: COMMERCIAL

## 2018-10-30 VITALS
SYSTOLIC BLOOD PRESSURE: 114 MMHG | DIASTOLIC BLOOD PRESSURE: 67 MMHG | OXYGEN SATURATION: 100 % | BODY MASS INDEX: 26.93 KG/M2 | WEIGHT: 152 LBS | HEART RATE: 107 BPM

## 2018-10-30 DIAGNOSIS — K64.4 EXTERNAL HEMORRHOIDS: ICD-10-CM

## 2018-10-30 DIAGNOSIS — K21.00 GASTROESOPHAGEAL REFLUX DISEASE WITH ESOPHAGITIS: Primary | ICD-10-CM

## 2018-10-30 DIAGNOSIS — O21.9 NAUSEA AND VOMITING OF PREGNANCY, ANTEPARTUM: ICD-10-CM

## 2018-10-30 DIAGNOSIS — Z34.83 ENCOUNTER FOR SUPERVISION OF OTHER NORMAL PREGNANCY IN THIRD TRIMESTER: ICD-10-CM

## 2018-10-30 DIAGNOSIS — F51.01 PRIMARY INSOMNIA: ICD-10-CM

## 2018-10-30 PROCEDURE — 99207 ZZC PRENATAL VISIT: CPT | Performed by: ADVANCED PRACTICE MIDWIFE

## 2018-10-30 NOTE — PROGRESS NOTES
35w1d  Here with daughter Marietta. Reports hemorrhoid pain and heartburn that is interfering with her sleep. Changed pepcid to zantac and prescribed anusol. Also prescribed unisom to sleep- encouraged that sleep is especially important in the context of depression and that she should call if her sleep continues to be disturbed. Planning to have the FOB take FMLA for the first 2-3 weeks postpartum to help with the baby and allow her more sleep and recovery. Filled out FMLA paperwork. Reports good fetal movement. Denies leaking of fluid, vaginal bleeding, regular uterine contractions, headache or other concerns.  RTC in 1 wk for GBS and hgb. KIRA

## 2018-10-30 NOTE — MR AVS SNAPSHOT
"              After Visit Summary   10/30/2018    Sharmayne Morgan    MRN: [id number]           Patient Information     Date Of Birth          1995        Visit Information        Provider Department      10/30/2018 12:15 PM Yokasta Brink APRN CNM [facility] [place]        Today's Diagnoses     Gastroesophageal reflux disease with esophagitis    -  1    External hemorrhoids        Nausea and vomiting of pregnancy, antepartum        Primary insomnia        Encounter for supervision of other normal pregnancy in third trimester           Follow-ups after your visit        Who to contact     If you have questions or need follow up information about today's clinic visit or your schedule please contact [facility] directly at [phone].  Normal or non-critical lab and imaging results will be communicated to you by MyChart, letter or phone within 4 business days after the clinic has received the results. If you do not hear from us within 7 days, please contact the clinic through MyChart or phone. If you have a critical or abnormal lab result, we will notify you by phone as soon as possible.  Submit refill requests through [employer] or call your pharmacy and they will forward the refill request to us. Please allow 3 business days for your refill to be completed.          Additional Information About Your Visit        MyChart Information     [employer] lets you send messages to your doctor, view your test results, renew your prescriptions, schedule appointments and more. To sign up, go to [email or web address] . Click on \"Log in\" on the left side of the screen, which will take you to the Welcome page. Then click on \"Sign up Now\" on the right side of the page.     You will be asked to enter the access code listed below, as well as some personal information. Please follow the directions to create your username and password.     Your access code is: [email or web address]  Expires: 1/9/2019  5:03 " PM     Your access code will  in 90 days. If you need help or a new code, please call your [facility] clinic or [phone].        Care EveryWhere ID     This is your Care EveryWhere ID. This could be used by other organizations to access your [facility] medical records  [id number]        Your Vitals Were     Pulse Last Period Pulse Oximetry Breastfeeding? BMI (Body Mass Index)       107 2018 (Exact Date) 100% No 26.93 kg/m2        Blood Pressure from Last 3 Encounters:   10/30/18 114/67   10/17/18 104/60   10/05/18 113/64    Weight from Last 3 Encounters:   10/30/18 152 lb (68.9 kg)   10/17/18 149 lb 4.8 oz (67.7 kg)   10/05/18 145 lb (65.8 kg)              We Performed the Following     Group B strep PCR     OB hemoglobin          Today's Medication Changes          These changes are accurate as of 10/30/18  1:23 PM.  If you have any questions, ask your nurse or doctor.               Start taking these medicines.        Dose/Directions    hydrocortisone 2.5 % cream   Commonly known as:  ANUSOL-HC   Used for:  External hemorrhoids   Started by:  Yokasta Brink APRN CNM        Place rectally 2 times daily as needed for hemorrhoids   Quantity:  30 g   Refills:  0       ranitidine 150 MG tablet   Commonly known as:  ZANTAC   Used for:  Gastroesophageal reflux disease with esophagitis   Started by:  Yokasta Brink APRN CNM        Dose:  150 mg   Take 1 tablet (150 mg) by mouth 2 times daily   Quantity:  60 tablet   Refills:  1            Where to get your medicines      These medications were sent to [facility] [address]  [address] [address]     Phone:  [phone]     doxylamine 25 MG Tabs tablet    hydrocortisone 2.5 % cream    ranitidine 150 MG tablet                Primary Care Provider Office Phone # Fax #    [facility] [phone]       [address] [place] [address]         Goals        General    Mental Health Management (pt-stated)     Notes - Note created  8/17/2018  3:09 PM by Nancy Navas BSW    Goal Statement: I will attend my appointments to help my physical and mental well being  Measure of Success: Patient will attend appointments  Supportive Steps to Achieve: SW will assist patient.  Barriers: Patient will not remember  Strengths: Motivated to chagne  Date to Achieve By: ongoing  Patient expressed understanding of goal: yes          Equal Access to Services     TRISTON DEL VALLE AH: Hadii aad ku hadasho Soomaali, waaxda luqadaha, qaybta kaalmada adeegyada, waxay idiin hayprincen lamartarik khmaureensh la'aan ah. So [facility] [phone].    ATENCIÓN: Si habla español, tiene a horn disposición servicios gratuitos de asistencia lingüística. Llame al [phone].    We comply with applicable federal civil rights laws and [place] laws. We do not discriminate on the basis of race, color, national origin, age, disability, sex, sexual orientation, or gender identity.            Thank you!     Thank you for [place] [facility]  [place] your [place]. Our goal is always to provide you with excellent care. Hearing back from our patients is one way we can continue to improve our services. Please take a few minutes to complete the written survey that you may receive in the mail after your visit with us. Thank you!             Your Updated Medication List - Protect others around you: Learn how to safely use, store and throw away your medicines at www.disposemymeds.org.          This list is accurate as of 10/30/18  1:23 PM.  Always use your most recent med list.                   Brand Name Dispense Instructions for use Diagnosis    Benzoyl Peroxide 6 % Lotn    BENZOYL PEROXIDE CLEANSER    170.3 g    Externally apply topically daily as needed    Hydradenitis       chlorhexidine 4 % liquid    HIBICLENS    236 mL    Apply topically daily as needed for wound care    Hydradenitis       clindamycin 1 %  solution    CLEOCIN T    60 mL    Apply topically 2 times daily    Hydradenitis       Clindamycin Phos-benzoyl Perox 1.2-2.5 % Gel    ACANYA    50 g    Externally apply 0.5 inches topically daily Apply a pea sized amount to each affected area of the face    Hydradenitis       docusate sodium 100 MG tablet    COLACE    60 tablet    Take 100 mg by mouth daily    Constipation in pregnancy in third trimester       doxylamine 25 MG Tabs tablet    UNISOM    14 each    Take 1 tablet (25 mg) by mouth At Bedtime    Nausea and vomiting of pregnancy, antepartum       famotidine 20 MG tablet    PEPCID    60 tablet    Take 1 tablet (20 mg) by mouth 2 times daily    Heartburn during pregnancy in third trimester       ferrous sulfate 325 (65 Fe) MG tablet    IRON    60 tablet    Take 1 tablet (325 mg) by mouth 2 times daily    Iron deficiency anemia, unspecified iron deficiency anemia type       hydrocortisone 2.5 % cream    ANUSOL-HC    30 g    Place rectally 2 times daily as needed for hemorrhoids    External hemorrhoids       ondansetron 4 MG ODT tab    ZOFRAN ODT    20 tablet    Take 1-2 tablets (4-8 mg) by mouth every 8 hours as needed for nausea    Nausea and vomiting in pregnancy prior to 22 weeks gestation       prenatal multivitamin plus iron 27-0.8 MG Tabs per tablet     100 tablet    Take 1 tablet by mouth daily    Supervision of normal pregnancy in second trimester       pyridOXINE 50 MG tablet    CVS VITAMIN B-6    90 tablet    Take 1 tablet (50 mg) by mouth 2 times daily as needed    Nausea and vomiting of pregnancy, antepartum       QUEtiapine 25 MG tablet    SEROQUEL    90 tablet    Take 25mg at bedtime, then increase by 25mg each week to a maximum dose of 100mg at bedtime.    Severe episode of recurrent major depressive disorder, with psychotic features (H), Anxiety disorder, unspecified type       ranitidine 150 MG tablet    ZANTAC    60 tablet    Take 1 tablet (150 mg) by mouth 2 times daily    Gastroesophageal  reflux disease with esophagitis       sertraline 25 MG tablet    ZOLOFT    30 tablet    Take 1 tablet (25 mg) by mouth daily    Severe episode of recurrent major depressive disorder, with psychotic features (H)

## 2018-11-06 ENCOUNTER — PRENATAL OFFICE VISIT (OUTPATIENT)
Dept: MIDWIFE SERVICES | Facility: CLINIC | Age: 23
End: 2018-11-06
Payer: COMMERCIAL

## 2018-11-06 VITALS
SYSTOLIC BLOOD PRESSURE: 111 MMHG | DIASTOLIC BLOOD PRESSURE: 68 MMHG | WEIGHT: 155 LBS | OXYGEN SATURATION: 100 % | HEART RATE: 85 BPM | BODY MASS INDEX: 27.46 KG/M2

## 2018-11-06 DIAGNOSIS — Z34.83 ENCOUNTER FOR SUPERVISION OF OTHER NORMAL PREGNANCY IN THIRD TRIMESTER: Primary | ICD-10-CM

## 2018-11-06 DIAGNOSIS — Z34.80 SUPERVISION OF OTHER NORMAL PREGNANCY, ANTEPARTUM: Primary | ICD-10-CM

## 2018-11-06 LAB — HGB BLD-MCNC: 10.6 G/DL (ref 11.7–15.7)

## 2018-11-06 PROCEDURE — 99207 ZZC PRENATAL VISIT: CPT | Performed by: ADVANCED PRACTICE MIDWIFE

## 2018-11-06 PROCEDURE — 87653 STREP B DNA AMP PROBE: CPT | Performed by: ADVANCED PRACTICE MIDWIFE

## 2018-11-06 PROCEDURE — 36415 COLL VENOUS BLD VENIPUNCTURE: CPT | Performed by: ADVANCED PRACTICE MIDWIFE

## 2018-11-06 PROCEDURE — 85018 HEMOGLOBIN: CPT | Performed by: ADVANCED PRACTICE MIDWIFE

## 2018-11-06 ASSESSMENT — PATIENT HEALTH QUESTIONNAIRE - PHQ9: SUM OF ALL RESPONSES TO PHQ QUESTIONS 1-9: 20

## 2018-11-06 NOTE — MR AVS SNAPSHOT
"              After Visit Summary   2018    Sharmayne Morgan    MRN: [id number]           Patient Information     Date Of Birth          1995        Visit Information        Provider Department      2018 12:00 PM Yokasta Brink APRN CNM [facility]        Today's Diagnoses     Encounter for supervision of other normal pregnancy in third trimester    -  1       Follow-ups after your visit        Who to contact     If you have questions or need follow up information about today's clinic visit or your schedule please contact [facility] directly at [phone].  Normal or non-critical lab and imaging results will be communicated to you by [employer], letter or phone within 4 business days after the clinic has received the results. If you do not hear from us within 7 days, please contact the clinic through [employer] or phone. If you have a critical or abnormal lab result, we will notify you by phone as soon as possible.  Submit refill requests through [employer] or call your pharmacy and they will forward the refill request to us. Please allow 3 business days for your refill to be completed.          Additional Information About Your Visit        MyChart Information     [employer] lets you send messages to your doctor, view your test results, renew your prescriptions, schedule appointments and more. To sign up, go to [email or web address] . Click on \"Log in\" on the left side of the screen, which will take you to the Welcome page. Then click on \"Sign up Now\" on the right side of the page.     You will be asked to enter the access code listed below, as well as some personal information. Please follow the directions to create your username and password.     Your access code is: [email or web address]  Expires: 2019  4:03 PM     Your access code will  in 90 days. If you need help or a new code, please call your [facility] or [phone].        Care EveryWhere ID     This " is your Care EveryWhere ID. This could be used by other organizations to access your [facility] medical records  [id number]        Your Vitals Were     Pulse Last Period Pulse Oximetry Breastfeeding? BMI (Body Mass Index)       85 03/05/2018 (Exact Date) 100% No 27.46 kg/m2        Blood Pressure from Last 3 Encounters:   11/06/18 111/68   10/30/18 114/67   10/17/18 104/60    Weight from Last 3 Encounters:   11/06/18 155 lb (70.3 kg)   10/30/18 152 lb (68.9 kg)   10/17/18 149 lb 4.8 oz (67.7 kg)              We Performed the Following     Group B strep PCR        Primary Care Provider Office Phone # Fax #    [facility] [phone]       [address] [place] [address]        Goals        General    Mental Health Management (pt-stated)     Notes - Note created  8/17/2018  3:09 PM by Nancy Navas BSW    Goal Statement: I will attend my appointments to help my physical and mental well being  Measure of Success: Patient will attend appointments  Supportive Steps to Achieve: SW will assist patient.  Barriers: Patient will not remember  Strengths: Motivated to chagne  Date to Achieve By: ongoing  Patient expressed understanding of goal: yes          Equal Access to Services     TRISTON DEL VALLE AH: Donny cardo Sojacklyn, waaxda luqadaha, qaybta kaalmada adeegyada, oralia hays. So [facility] [phone].    ATENCIÓN: Si habla español, tiene a horn disposición servicios gratuitos de asistencia lingüística. Llame al [phone].    We comply with applicable federal civil rights laws and [place] laws. We do not discriminate on the basis of race, color, national origin, age, disability, sex, sexual orientation, or gender identity.            Thank you!     Thank you for choosing [facility]  for your care. Our goal is always to provide you with excellent care. Hearing back from our patients is one way we can continue to improve our services.  Please take a few minutes to complete the written survey that you may receive in the mail after your visit with us. Thank you!             Your Updated Medication List - Protect others around you: Learn how to safely use, store and throw away your medicines at www.disposemymeds.org.          This list is accurate as of 11/6/18  3:30 PM.  Always use your most recent med list.                   Brand Name Dispense Instructions for use Diagnosis    Benzoyl Peroxide 6 % Lotn    BENZOYL PEROXIDE CLEANSER    170.3 g    Externally apply topically daily as needed    Hydradenitis       chlorhexidine 4 % liquid    HIBICLENS    236 mL    Apply topically daily as needed for wound care    Hydradenitis       clindamycin 1 % solution    CLEOCIN T    60 mL    Apply topically 2 times daily    Hydradenitis       Clindamycin Phos-benzoyl Perox 1.2-2.5 % Gel    ACANYA    50 g    Externally apply 0.5 inches topically daily Apply a pea sized amount to each affected area of the face    Hydradenitis       docusate sodium 100 MG tablet    COLACE    60 tablet    Take 100 mg by mouth daily    Constipation in pregnancy in third trimester       doxylamine 25 MG Tabs tablet    UNISOM    14 each    Take 1 tablet (25 mg) by mouth At Bedtime    Nausea and vomiting of pregnancy, antepartum       famotidine 20 MG tablet    PEPCID    60 tablet    Take 1 tablet (20 mg) by mouth 2 times daily    Heartburn during pregnancy in third trimester       ferrous sulfate 325 (65 Fe) MG tablet    IRON    60 tablet    Take 1 tablet (325 mg) by mouth 2 times daily    Iron deficiency anemia, unspecified iron deficiency anemia type       hydrocortisone 2.5 % cream    ANUSOL-HC    30 g    Place rectally 2 times daily as needed for hemorrhoids    External hemorrhoids       ondansetron 4 MG ODT tab    ZOFRAN ODT    20 tablet    Take 1-2 tablets (4-8 mg) by mouth every 8 hours as needed for nausea    Nausea and vomiting in pregnancy prior to 22 weeks gestation        prenatal multivitamin plus iron 27-0.8 MG Tabs per tablet     100 tablet    Take 1 tablet by mouth daily    Supervision of normal pregnancy in second trimester       pyridOXINE 50 MG tablet    CVS VITAMIN B-6    90 tablet    Take 1 tablet (50 mg) by mouth 2 times daily as needed    Nausea and vomiting of pregnancy, antepartum       QUEtiapine 25 MG tablet    SEROQUEL    90 tablet    Take 25mg at bedtime, then increase by 25mg each week to a maximum dose of 100mg at bedtime.    Severe episode of recurrent major depressive disorder, with psychotic features (H), Anxiety disorder, unspecified type       ranitidine 150 MG tablet    ZANTAC    60 tablet    Take 1 tablet (150 mg) by mouth 2 times daily    Gastroesophageal reflux disease with esophagitis       sertraline 25 MG tablet    ZOLOFT    30 tablet    Take 1 tablet (25 mg) by mouth daily    Severe episode of recurrent major depressive disorder, with psychotic features (H)

## 2018-11-06 NOTE — PROGRESS NOTES
36w1d  Feeling well. Sleeping better and heartburn is better with new medication. Ready for baby but she usually delivers closer to her due date. States her last labor was an induction and that she felt her contractions only 10 minutes before delivering. Reports good fetal movement. Denies leaking of fluid, vaginal bleeding, regular uterine contractions, headache or other concerns.  RTC in 1 wk [facility]

## 2018-11-07 LAB
GP B STREP DNA SPEC QL NAA+PROBE: NEGATIVE
SPECIMEN SOURCE: NORMAL

## 2018-11-14 ENCOUNTER — PRENATAL OFFICE VISIT (OUTPATIENT)
Dept: MIDWIFE SERVICES | Facility: CLINIC | Age: 23
End: 2018-11-14
Payer: COMMERCIAL

## 2018-11-14 VITALS
WEIGHT: 156.5 LBS | DIASTOLIC BLOOD PRESSURE: 74 MMHG | BODY MASS INDEX: 27.72 KG/M2 | TEMPERATURE: 97.7 F | SYSTOLIC BLOOD PRESSURE: 118 MMHG | HEART RATE: 110 BPM

## 2018-11-14 DIAGNOSIS — Z34.83 ENCOUNTER FOR SUPERVISION OF OTHER NORMAL PREGNANCY IN THIRD TRIMESTER: Primary | ICD-10-CM

## 2018-11-14 PROCEDURE — 99207 ZZC PRENATAL VISIT: CPT | Performed by: ADVANCED PRACTICE MIDWIFE

## 2018-11-14 NOTE — PROGRESS NOTES
37w2d   Patient feeling well. Positive fetal movement. Denies water leaking, vaginal bleeding, decreased fetal movement, contraction pain, or headaches.   Doing well. Liking Seraquil but tapering off to prepare for the delivery. She is planning to taper back up after the delivery. No questions or concerns today. Feeling ready for the baby to come, getting things ready at home also.   Danger signs reviewed, pre-eclampsia signs and symptoms discussed.   Knows when to call triage and has phone numbers.   Follow up in 1 week.   Shira Holly CNM

## 2018-11-14 NOTE — MR AVS SNAPSHOT
After Visit Summary   11/14/2018    Sharmayne Morgan    MRN: [id number]           Patient Information     Date Of Birth          1995        Visit Information        Provider Department      11/14/2018 12:45 PM Shira Holly APRN CNM [facility]        Today's Diagnoses     Encounter for supervision of other normal pregnancy in third trimester    -  1       Follow-ups after your visit        Who to contact     If you have questions or need follow up information about today's clinic visit or your schedule please contact [facility] directly at [phone].  Normal or non-critical lab and imaging results will be communicated to you by MyChart, letter or phone within 4 business days after the clinic has received the results. If you do not hear from us within 7 days, please contact the clinic through MyChart or phone. If you have a critical or abnormal lab result, we will notify you by phone as soon as possible.  Submit refill requests through [employer] or call your pharmacy and they will forward the refill request to us. Please allow 3 business days for your refill to be completed.          Additional Information About Your Visit        Care EveryWhere ID     This is your Care EveryWhere ID. This could be used by other organizations to access your [facility] medical records  [id number]        Your Vitals Were     Pulse Temperature Last Period BMI (Body Mass Index)          110 97.7  F (36.5  C) (Oral) 03/05/2018 (Exact Date) 27.72 kg/m2         Blood Pressure from Last 3 Encounters:   11/14/18 118/74   11/06/18 111/68   10/30/18 114/67    Weight from Last 3 Encounters:   11/14/18 156 lb 8 oz (71 kg)   11/06/18 155 lb (70.3 kg)   10/30/18 152 lb (68.9 kg)              Today, you had the following     No orders found for display       Primary Care Provider Office Phone # Fax #    [facility] [phone]       [address] [facility]  [facility] [address]        Goals        General    Mental Health Management (pt-stated)     Notes - Note created  8/17/2018  3:09 PM by Nancy Navas BSW    Goal Statement: I will attend my appointments to help my physical and mental well being  Measure of Success: Patient will attend appointments  Supportive Steps to Achieve: SW will assist patient.  Barriers: Patient will not remember  Strengths: Motivated to chagne  Date to Achieve By: ongoing  Patient expressed understanding of goal: yes          Equal Access to Services     TRISTON DEL VALLE AH: Hadii brian ku hadasho Soomaali, waaxda luqadaha, qaybta kaalmada adeegyada, waxay smitain hayprincen fausto davilamaureentracy onofre . So [facility] [phone].    ATENCIÓN: Si habla español, tiene a horn disposición servicios gratuitos de asistencia lingüística. Llame al [phone].    We comply with applicable federal civil rights laws and [place] laws. We do not discriminate on the basis of race, color, national origin, age, disability, sex, sexual orientation, or gender identity.            Thank you!     Thank you for choosing [facility]  for your care. Our goal is always to provide you with excellent care. Hearing back from our patients is one way we can continue to improve our services. Please take a few minutes to complete the written survey that you may receive in the mail after your visit with us. Thank you!             Your Updated Medication List - Protect others around you: Learn how to safely use, store and throw away your medicines at www.disposemymeds.org.          This list is accurate as of 11/14/18  1:48 PM.  Always use your most recent med list.                   Brand Name Dispense Instructions for use Diagnosis    Benzoyl Peroxide 6 % Lotn    BENZOYL PEROXIDE CLEANSER    170.3 g    Externally apply topically daily as needed    Hydradenitis       chlorhexidine 4 % liquid    HIBICLENS    236 mL    Apply topically daily as needed for wound care     Hydradenitis       clindamycin 1 % solution    CLEOCIN T    60 mL    Apply topically 2 times daily    Hydradenitis       Clindamycin Phos-benzoyl Perox 1.2-2.5 % Gel    ACANYA    50 g    Externally apply 0.5 inches topically daily Apply a pea sized amount to each affected area of the face    Hydradenitis       docusate sodium 100 MG tablet    COLACE    60 tablet    Take 100 mg by mouth daily    Constipation in pregnancy in third trimester       doxylamine 25 MG Tabs tablet    UNISOM    14 each    Take 1 tablet (25 mg) by mouth At Bedtime    Nausea and vomiting of pregnancy, antepartum       famotidine 20 MG tablet    PEPCID    60 tablet    Take 1 tablet (20 mg) by mouth 2 times daily    Heartburn during pregnancy in third trimester       ferrous sulfate 325 (65 Fe) MG tablet    IRON    60 tablet    Take 1 tablet (325 mg) by mouth 2 times daily    Iron deficiency anemia, unspecified iron deficiency anemia type       hydrocortisone 2.5 % cream    ANUSOL-HC    30 g    Place rectally 2 times daily as needed for hemorrhoids    External hemorrhoids       ondansetron 4 MG ODT tab    ZOFRAN ODT    20 tablet    Take 1-2 tablets (4-8 mg) by mouth every 8 hours as needed for nausea    Nausea and vomiting in pregnancy prior to 22 weeks gestation       prenatal multivitamin plus iron 27-0.8 MG Tabs per tablet     100 tablet    Take 1 tablet by mouth daily    Supervision of normal pregnancy in second trimester       pyridOXINE 50 MG tablet    CVS VITAMIN B-6    90 tablet    Take 1 tablet (50 mg) by mouth 2 times daily as needed    Nausea and vomiting of pregnancy, antepartum       QUEtiapine 25 MG tablet    SEROQUEL    90 tablet    Take 25mg at bedtime, then increase by 25mg each week to a maximum dose of 100mg at bedtime.    Severe episode of recurrent major depressive disorder, with psychotic features (H), Anxiety disorder, unspecified type       ranitidine 150 MG tablet    ZANTAC    60 tablet    Take 1 tablet (150 mg) by mouth  2 times daily    Gastroesophageal reflux disease with esophagitis       sertraline 25 MG tablet    ZOLOFT    30 tablet    Take 1 tablet (25 mg) by mouth daily    Severe episode of recurrent major depressive disorder, with psychotic features (H)

## 2018-11-20 ENCOUNTER — PRENATAL OFFICE VISIT (OUTPATIENT)
Dept: MIDWIFE SERVICES | Facility: CLINIC | Age: 23
End: 2018-11-20
Payer: COMMERCIAL

## 2018-11-20 VITALS
TEMPERATURE: 96.4 F | HEART RATE: 92 BPM | DIASTOLIC BLOOD PRESSURE: 69 MMHG | WEIGHT: 156 LBS | BODY MASS INDEX: 27.63 KG/M2 | SYSTOLIC BLOOD PRESSURE: 130 MMHG

## 2018-11-20 DIAGNOSIS — Z34.80 SUPERVISION OF OTHER NORMAL PREGNANCY, ANTEPARTUM: Primary | ICD-10-CM

## 2018-11-20 PROCEDURE — 99207 ZZC PRENATAL VISIT: CPT | Performed by: ADVANCED PRACTICE MIDWIFE

## 2018-11-20 NOTE — MR AVS SNAPSHOT
After Visit Summary   11/20/2018    Sharmayne Morgan    MRN: [id number]           Patient Information     Date Of Birth          1995        Visit Information        Provider Department      11/20/2018 11:45 AM Caridad Che CNM [facility] [place]        Today's Diagnoses     Supervision of other normal pregnancy, antepartum    -  1       Follow-ups after your visit        Follow-up notes from your care team     Return in about 1 week (around 11/27/2018) for Prenatal care with [facility].      Who to contact     If you have questions or need follow up information about today's clinic visit or your schedule please contact [facility] [place] [facility] directly at [phone].  Normal or non-critical lab and imaging results will be communicated to you by MyChart, letter or phone within 4 business days after the clinic has received the results. If you do not hear from us within 7 days, please contact the clinic through MyChart or phone. If you have a critical or abnormal lab result, we will notify you by phone as soon as possible.  Submit refill requests through [facility] or call your pharmacy and they will forward the refill request to us. Please allow 3 business days for your refill to be completed.          Additional Information About Your Visit        Care EveryWhere ID     This is your Care EveryWhere ID. This could be used by other organizations to access your [place] medical records  [id number]        Your Vitals Were     Pulse Temperature Last Period BMI (Body Mass Index)          92 96.4  F (35.8  C) (Oral) 03/05/2018 (Exact Date) 27.63 kg/m2         Blood Pressure from Last 3 Encounters:   11/20/18 130/69   11/14/18 118/74   11/06/18 111/68    Weight from Last 3 Encounters:   11/20/18 156 lb (70.8 kg)   11/14/18 156 lb 8 oz (71 kg)   11/06/18 155 lb (70.3 kg)              Today, you had the following     No orders found for display       Primary Care Provider Office Phone # Fax #     [facility] [phone]       [address] [place] [address]        Goals        General    Mental Health Management (pt-stated)     Notes - Note created  8/17/2018  3:09 PM by Nancy Navas BSW    Goal Statement: I will attend my appointments to help my physical and mental well being  Measure of Success: Patient will attend appointments  Supportive Steps to Achieve: SW will assist patient.  Barriers: Patient will not remember  Strengths: Motivated to chagne  Date to Achieve By: ongoing  Patient expressed understanding of goal: yes          Equal Access to Services     TRISTON DEL VALLE AH: Hadii aad ku hadasho Soomaali, waaxda luqadaha, qaybta kaalmada adeegyada, waxmary hope haykelly hays. So [facility] [phone].    ATENCIÓN: Si habla español, tiene a horn disposición servicios gratuitos de asistencia lingüística. Llame al [phone].    We comply with applicable federal civil rights laws and [place] laws. We do not discriminate on the basis of race, color, national origin, age, disability, sex, sexual orientation, or gender identity.            Thank you!     Thank you for choosing [facility]  for your care. Our goal is always to provide you with excellent care. Hearing back from our patients is one way we can continue to improve our services. Please take a few minutes to complete the written survey that you may receive in the mail after your visit with us. Thank you!             Your Updated Medication List - Protect others around you: Learn how to safely use, store and throw away your medicines at www.disposemymeds.org.          This list is accurate as of 11/20/18 12:23 PM.  Always use your most recent med list.                   Brand Name Dispense Instructions for use Diagnosis    Benzoyl Peroxide 6 % Lotn    BENZOYL PEROXIDE CLEANSER    170.3 g    Externally apply topically daily as needed    Hydradenitis       chlorhexidine 4 % liquid     HIBICLENS    236 mL    Apply topically daily as needed for wound care    Hydradenitis       clindamycin 1 % solution    CLEOCIN T    60 mL    Apply topically 2 times daily    Hydradenitis       clindamycin phos-benzoyl perox 1.2-2.5 % gel    ACANYA    50 g    Externally apply 0.5 inches topically daily Apply a pea sized amount to each affected area of the face    Hydradenitis       docusate sodium 100 MG tablet    COLACE    60 tablet    Take 100 mg by mouth daily    Constipation in pregnancy in third trimester       doxylamine 25 MG Tabs tablet    UNISOM    14 each    Take 1 tablet (25 mg) by mouth At Bedtime    Nausea and vomiting of pregnancy, antepartum       famotidine 20 MG tablet    PEPCID    60 tablet    Take 1 tablet (20 mg) by mouth 2 times daily    Heartburn during pregnancy in third trimester       ferrous sulfate 325 (65 Fe) MG tablet    IRON    60 tablet    Take 1 tablet (325 mg) by mouth 2 times daily    Iron deficiency anemia, unspecified iron deficiency anemia type       hydrocortisone 2.5 % cream    ANUSOL-HC    30 g    Place rectally 2 times daily as needed for hemorrhoids    External hemorrhoids       ondansetron 4 MG ODT tab    ZOFRAN ODT    20 tablet    Take 1-2 tablets (4-8 mg) by mouth every 8 hours as needed for nausea    Nausea and vomiting in pregnancy prior to 22 weeks gestation       prenatal multivitamin plus iron 27-0.8 MG Tabs per tablet     100 tablet    Take 1 tablet by mouth daily    Supervision of normal pregnancy in second trimester       pyridOXINE 50 MG tablet    [employer] VITAMIN B-6    90 tablet    Take 1 tablet (50 mg) by mouth 2 times daily as needed    Nausea and vomiting of pregnancy, antepartum       QUEtiapine 25 MG tablet    SEROQUEL    90 tablet    Take 25mg at bedtime, then increase by 25mg each week to a maximum dose of 100mg at bedtime.    Severe episode of recurrent major depressive disorder, with psychotic features (H), Anxiety disorder, unspecified type        ranitidine 150 MG tablet    ZANTAC    60 tablet    Take 1 tablet (150 mg) by mouth 2 times daily    Gastroesophageal reflux disease with esophagitis       sertraline 25 MG tablet    ZOLOFT    30 tablet    Take 1 tablet (25 mg) by mouth daily    Severe episode of recurrent major depressive disorder, with psychotic features (H)

## 2018-11-20 NOTE — PROGRESS NOTES
Feeling well.  Baby is active. Denies any leaking of fluid, vaginal bleeding, regular uterine contractions, or headaches or other concerns.  Cervix check per her request.    She said that her water was leaking and she had to be induced for other labors.    Reviewed to call [phone] for contractions, loss of fluid, vaginal bleeding, decreased fetal movement or any other questions or concerns.    RTC in 1 weeks.  Caridad Che, SHEELA, APRN, CNM

## 2018-11-26 ENCOUNTER — PRENATAL OFFICE VISIT (OUTPATIENT)
Dept: MIDWIFE SERVICES | Facility: CLINIC | Age: 23
End: 2018-11-26
Payer: COMMERCIAL

## 2018-11-26 VITALS
HEIGHT: 63 IN | OXYGEN SATURATION: 100 % | HEART RATE: 72 BPM | WEIGHT: 158.4 LBS | DIASTOLIC BLOOD PRESSURE: 71 MMHG | BODY MASS INDEX: 28.07 KG/M2 | SYSTOLIC BLOOD PRESSURE: 117 MMHG

## 2018-11-26 DIAGNOSIS — F33.3 SEVERE EPISODE OF RECURRENT MAJOR DEPRESSIVE DISORDER, WITH PSYCHOTIC FEATURES (H): ICD-10-CM

## 2018-11-26 DIAGNOSIS — O21.9 NAUSEA AND VOMITING IN PREGNANCY: ICD-10-CM

## 2018-11-26 DIAGNOSIS — Z34.80 SUPERVISION OF OTHER NORMAL PREGNANCY, ANTEPARTUM: Primary | ICD-10-CM

## 2018-11-26 PROCEDURE — 99207 ZZC PRENATAL VISIT: CPT | Performed by: ADVANCED PRACTICE MIDWIFE

## 2018-11-26 RX ORDER — ONDANSETRON 4 MG/1
4-8 TABLET, ORALLY DISINTEGRATING ORAL EVERY 8 HOURS PRN
Qty: 20 TABLET | Refills: 3 | Status: ON HOLD | OUTPATIENT
Start: 2018-11-26 | End: 2018-12-01

## 2018-11-26 RX ORDER — PYRIDOXINE HCL (VITAMIN B6) 50 MG
50 TABLET ORAL 2 TIMES DAILY PRN
Qty: 90 TABLET | Refills: 0 | Status: ON HOLD | OUTPATIENT
Start: 2018-11-26 | End: 2018-12-01

## 2018-11-26 NOTE — PROGRESS NOTES
Feeling well.  Baby is active. Denies any leaking of fluid, vaginal bleeding, regular uterine contractions, or headaches or other concerns.  Said that she is totally off pysch meds as advised by psychiatrist. I said that Zoloft is the most studied antidepressant in pregnancy and breastfeeding.  If she thinks that helps her that can be taken now.    Reviewed to call [phone] for contractions, loss of fluid, vaginal bleeding, decreased fetal movement or any other questions or concerns.    RTC in 1 weeks.  Caridad Che, DNP, APRN, CNM

## 2018-11-26 NOTE — MR AVS SNAPSHOT
"              After Visit Summary   11/26/2018    Sharmayne Morgan    MRN: [id number]           Patient Information     Date Of Birth          1995        Visit Information        Provider Department      11/26/2018 11:30 AM Caridad Ceh CNM [facility]        Today's Diagnoses     Supervision of other normal pregnancy, antepartum    -  1    Severe episode of recurrent major depressive disorder, with psychotic features (H)        Nausea and vomiting in pregnancy           Follow-ups after your visit        Follow-up notes from your care team     Return in about 1 week (around 12/3/2018) for Prenatal care with [facility].      Who to contact     If you have questions or need follow up information about today's clinic visit or your schedule please contact [facility] directly at [phone].  Normal or non-critical lab and imaging results will be communicated to you by MyChart, letter or phone within 4 business days after the clinic has received the results. If you do not hear from us within 7 days, please contact the clinic through MyChart or phone. If you have a critical or abnormal lab result, we will notify you by phone as soon as possible.  Submit refill requests through [facility] or call your pharmacy and they will forward the refill request to us. Please allow 3 business days for your refill to be completed.          Additional Information About Your Visit        Care EveryWhere ID     This is your Care EveryWhere ID. This could be used by other organizations to access your [place] medical records  [id number]        Your Vitals Were     Pulse Height Last Period Pulse Oximetry BMI (Body Mass Index)       72 5' 3\" (1.6 m) 03/05/2018 (Exact Date) 100% 28.06 kg/m2        Blood Pressure from Last 3 Encounters:   11/26/18 117/71   11/20/18 130/69   11/14/18 118/74    Weight from Last 3 Encounters:   11/26/18 158 lb 6.4 oz (71.8 kg)   11/20/18 156 lb (70.8 kg)   11/14/18 156 lb 8 " oz (71 kg)              Today, you had the following     No orders found for display         Today's Medication Changes          These changes are accurate as of 11/26/18 12:20 PM.  If you have any questions, ask your nurse or doctor.               These medicines have changed or have updated prescriptions.        Dose/Directions    Benzoyl Peroxide 6 % Lotn   Commonly known as:  BENZOYL PEROXIDE CLEANSER   This may have changed:  reasons to take this   Changed by:  Caridad Che CNM        Externally apply topically daily as needed (acne)   Quantity:  170.3 g   Refills:  3       doxylamine 25 MG Tabs tablet   Commonly known as:  UNISOM   This may have changed:    - when to take this  - reasons to take this   Changed by:  Caridad Che CNM        Dose:  25 mg   Take 1 tablet (25 mg) by mouth nightly as needed for sleep   Quantity:  30 each   Refills:  3            Where to get your medicines      These medications were sent to [facility] [address]  [address] [address]     Phone:  [phone]     Benzoyl Peroxide 6 % Lotn    doxylamine 25 MG Tabs tablet    ondansetron 4 MG ODT tab    pyridOXINE 50 MG tablet                Primary Care Provider Office Phone # Fax #    [facility] [phone]       [address] [place] [address]        Goals        General    Mental Health Management (pt-stated)     Notes - Note created  8/17/2018  3:09 PM by Nancy Navas BSW    Goal Statement: I will attend my appointments to help my physical and mental well being  Measure of Success: Patient will attend appointments  Supportive Steps to Achieve: SW will assist patient.  Barriers: Patient will not remember  Strengths: Motivated to chagne  Date to Achieve By: ongoing  Patient expressed understanding of goal: yes          Equal Access to Services     TRISTON DEL VALLE AH: jonathan Marley  viktoriya kevinmichijanene natalyaoralia zapien ah. So [facility] [phone].    ATENCIÓN: Si erin osrto, tiene a horn disposición servicios gratuitos de asistencia lingüística. Marni al [phone].    We comply with applicable federal civil rights laws and [place] laws. We do not discriminate on the basis of race, color, national origin, age, disability, sex, sexual orientation, or gender identity.            Thank you!     Thank you for choosing [facility]  for your care. Our goal is always to provide you with excellent care. Hearing back from our patients is one way we can continue to improve our services. Please take a few minutes to complete the written survey that you may receive in the mail after your visit with us. Thank you!             Your Updated Medication List - Protect others around you: Learn how to safely use, store and throw away your medicines at www.disposemymeds.org.          This list is accurate as of 11/26/18 12:20 PM.  Always use your most recent med list.                   Brand Name Dispense Instructions for use Diagnosis    Benzoyl Peroxide 6 % Lotn    BENZOYL PEROXIDE CLEANSER    170.3 g    Externally apply topically daily as needed (acne)        chlorhexidine 4 % liquid    HIBICLENS    236 mL    Apply topically daily as needed for wound care    Hydradenitis       clindamycin 1 % solution    CLEOCIN T    60 mL    Apply topically 2 times daily    Hydradenitis       clindamycin phos-benzoyl perox 1.2-2.5 % gel    ACANYA    50 g    Externally apply 0.5 inches topically daily Apply a pea sized amount to each affected area of the face    Hydradenitis       docusate sodium 100 MG tablet    COLACE    60 tablet    Take 100 mg by mouth daily    Constipation in pregnancy in third trimester       doxylamine 25 MG Tabs tablet    UNISOM    30 each    Take 1 tablet (25 mg) by mouth nightly as needed for sleep        famotidine 20 MG tablet    PEPCID    60  tablet    Take 1 tablet (20 mg) by mouth 2 times daily    Heartburn during pregnancy in third trimester       ferrous sulfate 325 (65 Fe) MG tablet    IRON    60 tablet    Take 1 tablet (325 mg) by mouth 2 times daily    Iron deficiency anemia, unspecified iron deficiency anemia type       hydrocortisone 2.5 % cream    ANUSOL-HC    30 g    Place rectally 2 times daily as needed for hemorrhoids    External hemorrhoids       ondansetron 4 MG ODT tab    ZOFRAN ODT    20 tablet    Take 1-2 tablets (4-8 mg) by mouth every 8 hours as needed for nausea        prenatal multivitamin plus iron 27-0.8 MG Tabs per tablet     100 tablet    Take 1 tablet by mouth daily    Supervision of normal pregnancy in second trimester       pyridOXINE 50 MG tablet    [employer]S VITAMIN B-6    90 tablet    Take 1 tablet (50 mg) by mouth 2 times daily as needed        QUEtiapine 25 MG tablet    SEROQUEL    90 tablet    Take 25mg at bedtime, then increase by 25mg each week to a maximum dose of 100mg at bedtime.    Severe episode of recurrent major depressive disorder, with psychotic features (H), Anxiety disorder, unspecified type       ranitidine 150 MG tablet    ZANTAC    60 tablet    Take 1 tablet (150 mg) by mouth 2 times daily    Gastroesophageal reflux disease with esophagitis       sertraline 25 MG tablet    ZOLOFT    30 tablet    Take 1 tablet (25 mg) by mouth daily    Severe episode of recurrent major depressive disorder, with psychotic features (H)

## 2018-11-30 ENCOUNTER — NURSE TRIAGE (OUTPATIENT)
Dept: NURSING | Facility: CLINIC | Age: 23
End: 2018-11-30

## 2018-11-30 ENCOUNTER — HOSPITAL ENCOUNTER (INPATIENT)
Facility: CLINIC | Age: 23
LOS: 2 days | Discharge: HOME HEALTH | End: 2018-12-02
Attending: ADVANCED PRACTICE MIDWIFE | Admitting: ADVANCED PRACTICE MIDWIFE
Payer: COMMERCIAL

## 2018-11-30 PROBLEM — Z36.89 ENCOUNTER FOR TRIAGE IN PREGNANT PATIENT: Status: ACTIVE | Noted: 2018-11-30

## 2018-11-30 LAB
ABO + RH BLD: NORMAL
ABO + RH BLD: NORMAL
BASOPHILS # BLD AUTO: 0 10E9/L (ref 0–0.2)
BASOPHILS NFR BLD AUTO: 0.1 %
BLD GP AB SCN SERPL QL: NORMAL
BLOOD BANK CMNT PATIENT-IMP: NORMAL
DIFFERENTIAL METHOD BLD: ABNORMAL
EOSINOPHIL # BLD AUTO: 0.1 10E9/L (ref 0–0.7)
EOSINOPHIL NFR BLD AUTO: 0.7 %
ERYTHROCYTE [DISTWIDTH] IN BLOOD BY AUTOMATED COUNT: 12.4 % (ref 10–15)
HCT VFR BLD AUTO: 32.5 % (ref 35–47)
HGB BLD-MCNC: 10.6 G/DL (ref 11.7–15.7)
IMM GRANULOCYTES # BLD: 0 10E9/L (ref 0–0.4)
IMM GRANULOCYTES NFR BLD: 0.1 %
LYMPHOCYTES # BLD AUTO: 2.6 10E9/L (ref 0.8–5.3)
LYMPHOCYTES NFR BLD AUTO: 38.7 %
MCH RBC QN AUTO: 29 PG (ref 26.5–33)
MCHC RBC AUTO-ENTMCNC: 32.6 G/DL (ref 31.5–36.5)
MCV RBC AUTO: 89 FL (ref 78–100)
MONOCYTES # BLD AUTO: 0.6 10E9/L (ref 0–1.3)
MONOCYTES NFR BLD AUTO: 8.1 %
NEUTROPHILS # BLD AUTO: 3.5 10E9/L (ref 1.6–8.3)
NEUTROPHILS NFR BLD AUTO: 52.3 %
NRBC # BLD AUTO: 0 10*3/UL
NRBC BLD AUTO-RTO: 0 /100
PLATELET # BLD AUTO: 223 10E9/L (ref 150–450)
RBC # BLD AUTO: 3.65 10E12/L (ref 3.8–5.2)
RUPTURE OF FETAL MEMBRANES BY ROM PLUS: POSITIVE
SPECIMEN EXP DATE BLD: NORMAL
T PALLIDUM AB SER QL: NONREACTIVE
WBC # BLD AUTO: 6.8 10E9/L (ref 4–11)

## 2018-11-30 PROCEDURE — 59400 OBSTETRICAL CARE: CPT | Performed by: ADVANCED PRACTICE MIDWIFE

## 2018-11-30 PROCEDURE — 85025 COMPLETE CBC W/AUTO DIFF WBC: CPT | Performed by: ADVANCED PRACTICE MIDWIFE

## 2018-11-30 PROCEDURE — 86900 BLOOD TYPING SEROLOGIC ABO: CPT | Performed by: ADVANCED PRACTICE MIDWIFE

## 2018-11-30 PROCEDURE — 84112 EVAL AMNIOTIC FLUID PROTEIN: CPT | Performed by: ADVANCED PRACTICE MIDWIFE

## 2018-11-30 PROCEDURE — 36415 COLL VENOUS BLD VENIPUNCTURE: CPT | Performed by: ADVANCED PRACTICE MIDWIFE

## 2018-11-30 PROCEDURE — 86850 RBC ANTIBODY SCREEN: CPT | Performed by: ADVANCED PRACTICE MIDWIFE

## 2018-11-30 PROCEDURE — 25000132 ZZH RX MED GY IP 250 OP 250 PS 637: Performed by: ADVANCED PRACTICE MIDWIFE

## 2018-11-30 PROCEDURE — 25000125 ZZHC RX 250: Performed by: ADVANCED PRACTICE MIDWIFE

## 2018-11-30 PROCEDURE — 86780 TREPONEMA PALLIDUM: CPT | Performed by: ADVANCED PRACTICE MIDWIFE

## 2018-11-30 PROCEDURE — 25000128 H RX IP 250 OP 636: Performed by: ADVANCED PRACTICE MIDWIFE

## 2018-11-30 PROCEDURE — G0463 HOSPITAL OUTPT CLINIC VISIT: HCPCS

## 2018-11-30 PROCEDURE — 72200001 ZZH LABOR CARE VAGINAL DELIVERY SINGLE

## 2018-11-30 PROCEDURE — 86901 BLOOD TYPING SEROLOGIC RH(D): CPT | Performed by: ADVANCED PRACTICE MIDWIFE

## 2018-11-30 PROCEDURE — 12000032 ZZH R&B OB CRITICAL UMMC

## 2018-11-30 RX ORDER — OXYTOCIN 10 [USP'U]/ML
INJECTION, SOLUTION INTRAMUSCULAR; INTRAVENOUS
Status: DISCONTINUED
Start: 2018-11-30 | End: 2018-12-01 | Stop reason: HOSPADM

## 2018-11-30 RX ORDER — SODIUM CHLORIDE, SODIUM LACTATE, POTASSIUM CHLORIDE, CALCIUM CHLORIDE 600; 310; 30; 20 MG/100ML; MG/100ML; MG/100ML; MG/100ML
INJECTION, SOLUTION INTRAVENOUS CONTINUOUS
Status: DISCONTINUED | OUTPATIENT
Start: 2018-11-30 | End: 2018-11-30

## 2018-11-30 RX ORDER — ACETAMINOPHEN 325 MG/1
650 TABLET ORAL EVERY 4 HOURS PRN
Status: DISCONTINUED | OUTPATIENT
Start: 2018-11-30 | End: 2018-12-02 | Stop reason: HOSPADM

## 2018-11-30 RX ORDER — ACETAMINOPHEN 325 MG/1
650 TABLET ORAL EVERY 4 HOURS PRN
Status: DISCONTINUED | OUTPATIENT
Start: 2018-11-30 | End: 2018-11-30

## 2018-11-30 RX ORDER — BISACODYL 10 MG
10 SUPPOSITORY, RECTAL RECTAL DAILY PRN
Status: DISCONTINUED | OUTPATIENT
Start: 2018-12-02 | End: 2018-12-02 | Stop reason: HOSPADM

## 2018-11-30 RX ORDER — AMOXICILLIN 250 MG
1 CAPSULE ORAL 2 TIMES DAILY
Status: DISCONTINUED | OUTPATIENT
Start: 2018-11-30 | End: 2018-12-02 | Stop reason: HOSPADM

## 2018-11-30 RX ORDER — IBUPROFEN 800 MG/1
800 TABLET, FILM COATED ORAL
Status: COMPLETED | OUTPATIENT
Start: 2018-11-30 | End: 2018-11-30

## 2018-11-30 RX ORDER — OXYTOCIN 10 [USP'U]/ML
10 INJECTION, SOLUTION INTRAMUSCULAR; INTRAVENOUS
Status: DISCONTINUED | OUTPATIENT
Start: 2018-11-30 | End: 2018-12-02 | Stop reason: HOSPADM

## 2018-11-30 RX ORDER — ONDANSETRON 2 MG/ML
4 INJECTION INTRAMUSCULAR; INTRAVENOUS EVERY 6 HOURS PRN
Status: DISCONTINUED | OUTPATIENT
Start: 2018-11-30 | End: 2018-11-30

## 2018-11-30 RX ORDER — OXYCODONE AND ACETAMINOPHEN 5; 325 MG/1; MG/1
1 TABLET ORAL
Status: DISCONTINUED | OUTPATIENT
Start: 2018-11-30 | End: 2018-11-30

## 2018-11-30 RX ORDER — IBUPROFEN 800 MG/1
800 TABLET, FILM COATED ORAL EVERY 6 HOURS
Status: DISCONTINUED | OUTPATIENT
Start: 2018-11-30 | End: 2018-12-02 | Stop reason: HOSPADM

## 2018-11-30 RX ORDER — LANOLIN 100 %
OINTMENT (GRAM) TOPICAL
Status: DISCONTINUED | OUTPATIENT
Start: 2018-11-30 | End: 2018-12-02 | Stop reason: HOSPADM

## 2018-11-30 RX ORDER — OXYTOCIN/0.9 % SODIUM CHLORIDE 30/500 ML
1-24 PLASTIC BAG, INJECTION (ML) INTRAVENOUS CONTINUOUS
Status: DISCONTINUED | OUTPATIENT
Start: 2018-11-30 | End: 2018-11-30

## 2018-11-30 RX ORDER — METHYLERGONOVINE MALEATE 0.2 MG/ML
200 INJECTION INTRAVENOUS
Status: DISCONTINUED | OUTPATIENT
Start: 2018-11-30 | End: 2018-11-30

## 2018-11-30 RX ORDER — HYDROCORTISONE 2.5 %
CREAM (GRAM) TOPICAL 3 TIMES DAILY PRN
Status: DISCONTINUED | OUTPATIENT
Start: 2018-11-30 | End: 2018-12-02 | Stop reason: HOSPADM

## 2018-11-30 RX ORDER — OXYTOCIN/0.9 % SODIUM CHLORIDE 30/500 ML
100-340 PLASTIC BAG, INJECTION (ML) INTRAVENOUS CONTINUOUS PRN
Status: DISCONTINUED | OUTPATIENT
Start: 2018-11-30 | End: 2018-11-30

## 2018-11-30 RX ORDER — OXYTOCIN/0.9 % SODIUM CHLORIDE 30/500 ML
100 PLASTIC BAG, INJECTION (ML) INTRAVENOUS CONTINUOUS
Status: DISCONTINUED | OUTPATIENT
Start: 2018-11-30 | End: 2018-12-02 | Stop reason: HOSPADM

## 2018-11-30 RX ORDER — MISOPROSTOL 200 UG/1
TABLET ORAL
Status: DISCONTINUED
Start: 2018-11-30 | End: 2018-12-01 | Stop reason: HOSPADM

## 2018-11-30 RX ORDER — NALOXONE HYDROCHLORIDE 0.4 MG/ML
.1-.4 INJECTION, SOLUTION INTRAMUSCULAR; INTRAVENOUS; SUBCUTANEOUS
Status: DISCONTINUED | OUTPATIENT
Start: 2018-11-30 | End: 2018-11-30

## 2018-11-30 RX ORDER — CARBOPROST TROMETHAMINE 250 UG/ML
250 INJECTION, SOLUTION INTRAMUSCULAR
Status: DISCONTINUED | OUTPATIENT
Start: 2018-11-30 | End: 2018-11-30

## 2018-11-30 RX ORDER — OXYTOCIN/0.9 % SODIUM CHLORIDE 30/500 ML
340 PLASTIC BAG, INJECTION (ML) INTRAVENOUS CONTINUOUS PRN
Status: DISCONTINUED | OUTPATIENT
Start: 2018-11-30 | End: 2018-12-02 | Stop reason: HOSPADM

## 2018-11-30 RX ORDER — NALOXONE HYDROCHLORIDE 0.4 MG/ML
.1-.4 INJECTION, SOLUTION INTRAMUSCULAR; INTRAVENOUS; SUBCUTANEOUS
Status: DISCONTINUED | OUTPATIENT
Start: 2018-11-30 | End: 2018-12-02 | Stop reason: HOSPADM

## 2018-11-30 RX ORDER — FENTANYL CITRATE 50 UG/ML
50-100 INJECTION, SOLUTION INTRAMUSCULAR; INTRAVENOUS
Status: DISCONTINUED | OUTPATIENT
Start: 2018-11-30 | End: 2018-11-30

## 2018-11-30 RX ORDER — OXYTOCIN/0.9 % SODIUM CHLORIDE 30/500 ML
PLASTIC BAG, INJECTION (ML) INTRAVENOUS
Status: DISCONTINUED
Start: 2018-11-30 | End: 2018-12-01 | Stop reason: HOSPADM

## 2018-11-30 RX ORDER — LIDOCAINE HYDROCHLORIDE 10 MG/ML
INJECTION, SOLUTION INFILTRATION; PERINEURAL
Status: DISCONTINUED
Start: 2018-11-30 | End: 2018-12-01 | Stop reason: HOSPADM

## 2018-11-30 RX ORDER — OXYTOCIN 10 [USP'U]/ML
10 INJECTION, SOLUTION INTRAMUSCULAR; INTRAVENOUS
Status: DISCONTINUED | OUTPATIENT
Start: 2018-11-30 | End: 2018-11-30

## 2018-11-30 RX ORDER — LIDOCAINE 40 MG/G
CREAM TOPICAL
Status: DISCONTINUED | OUTPATIENT
Start: 2018-11-30 | End: 2018-11-30

## 2018-11-30 RX ORDER — AMOXICILLIN 250 MG
2 CAPSULE ORAL 2 TIMES DAILY
Status: DISCONTINUED | OUTPATIENT
Start: 2018-11-30 | End: 2018-12-02 | Stop reason: HOSPADM

## 2018-11-30 RX ADMIN — SODIUM CHLORIDE, POTASSIUM CHLORIDE, SODIUM LACTATE AND CALCIUM CHLORIDE: 600; 310; 30; 20 INJECTION, SOLUTION INTRAVENOUS at 18:25

## 2018-11-30 RX ADMIN — OXYTOCIN-SODIUM CHLORIDE 0.9% IV SOLN 30 UNIT/500ML 100 ML/HR: 30-0.9/5 SOLUTION at 22:22

## 2018-11-30 RX ADMIN — IBUPROFEN 800 MG: 800 TABLET ORAL at 22:02

## 2018-11-30 RX ADMIN — OXYTOCIN-SODIUM CHLORIDE 0.9% IV SOLN 30 UNIT/500ML 2 MILLI-UNITS/MIN: 30-0.9/5 SOLUTION at 10:49

## 2018-11-30 RX ADMIN — SODIUM CHLORIDE, POTASSIUM CHLORIDE, SODIUM LACTATE AND CALCIUM CHLORIDE: 600; 310; 30; 20 INJECTION, SOLUTION INTRAVENOUS at 10:45

## 2018-11-30 ASSESSMENT — ACTIVITIES OF DAILY LIVING (ADL)
TRANSFERRING: 0-->INDEPENDENT
AMBULATION: 0-->INDEPENDENT
SWALLOWING: 0-->SWALLOWS FOODS/LIQUIDS WITHOUT DIFFICULTY
TOILETING: 0-->INDEPENDENT
RETIRED_COMMUNICATION: 0-->UNDERSTANDS/COMMUNICATES WITHOUT DIFFICULTY
COGNITION: 0 - NO COGNITION ISSUES REPORTED
BATHING: 0-->INDEPENDENT
FALL_HISTORY_WITHIN_LAST_SIX_MONTHS: NO
DRESS: 0-->INDEPENDENT
RETIRED_EATING: 0-->INDEPENDENT

## 2018-11-30 NOTE — PLAN OF CARE
Problem: Patient Care Overview  Goal: Plan of Care/Patient Progress Review  Outcome: Improving  Pt resting this morning and denies feeling contractions. Continuing to leak clear fluid and remains afebrile. Allowed cervix check by Ebony Trent CNM and found to be 3 centimeters dilated. Given option of waiting for contractions/labor to start or beginning augmentation with pitocin at this time and pt wishes to proceed with pitocin. PIV started and pitocin infusion began at 1045. Pt states all questions are answered. Planning no pain medication at this time.

## 2018-11-30 NOTE — PROVIDER NOTIFICATION
11/30/18 0413   Provider Notification   Provider Name/Title ROCK Mclean   Method of Notification At Bedside     Provider at bedside. OK to remove from monitor and proceed with intermittent auscultation. Plan to assess FHR every 30 minutes r/t unknown cervical exam.

## 2018-11-30 NOTE — PROVIDER NOTIFICATION
11/30/18 0349   Provider Notification   Provider Name/Title Caridad Che CNM   Method of Notification Phone     Provider returned page. Plan to admit pt for labor. Provider is on her way in to hospital.

## 2018-11-30 NOTE — TELEPHONE ENCOUNTER
"  Reason for Disposition    Leakage of fluid from vagina    Additional Information    Negative: [1] SEVERE abdominal pain (e.g., excruciating) AND [2] constant AND [3] present > 1 hour    Negative: Severe bleeding (e.g., continuous red blood from vagina, or large blood clots)    Negative: Umbilical cord hanging out of the vagina (shiny, white, curled appearance, \"like telephone cord\")    Negative: Uncontrollable urge to push (i.e., feels like baby is coming out now)    Negative: Can see baby    Negative: Sounds like a life-threatening emergency to the triager    Negative: < 20 weeks pregnant    Negative: Vaginal bleeding    Protocols used: PREGNANCY - RUPTURE OF MEMBRANES-ADULT-  Caller is pregnant and MATT is 12/3/18. Caller states her water broke. Triager called out to answering service, left message for on call provider Caridad Che to call patient at [phone]. Caller advised to call back if no return call within 20 minutes. Caller verbalized and understands directives.  "

## 2018-11-30 NOTE — PROGRESS NOTES
"  S: Sleeping.  Partner is with her.      O:  Blood pressure 107/70, pulse 77, temperature 98.1  F (36.7  C), temperature source Oral, resp. rate 18, height 1.6 m (5' 3\"), weight 72.6 kg (160 lb), last menstrual period 2018, not currently breastfeeding.  General appearance: comfortable    CONTRACTIONS: Contractions every 5-6 minutes.  Palpate: mild  FETAL HEART TONES: baseline 135 with moderate FHR variability and    accelerations yes. Decelerations no.    ROM: clear fluid  PELVIC EXAM:deferred  Fetal Position:  Cephalic   Bloody show: No  Pitocin- none,  Antibiotics- none  Cervical ripening: N/A  ASSESSMENT:  ==============  IUP @ 39w4d early labor and rupture of membranes  Fetal Heart rate tracing Category category one  GBS- negative     PLAN:  ===========  comfort measures prn   Anticipate   Consider Labor augmentation with Pitocin if contractions do not get more frequent    Caridad Che CNM    "

## 2018-11-30 NOTE — PROGRESS NOTES
Data: Patient presented to [facility] at 0307.   Reason for maternal/fetal assessment per patient is Rule out rupture of membranes  .  Patient is a . Prenatal record reviewed.      Obstetric History       T2      L2     SAB3   TAB0   Ectopic0   Multiple0   Live Births2       # Outcome Date GA Lbr Billy/2nd Weight Sex Delivery Anes PTL Lv   7 Current            6 AB 17 8w5d          5 Term 14 40w0d  3.005 kg (6 lb 10 oz) F  None N DARIUSZ   4 Term 09 41w0d  3.657 kg (8 lb 1 oz) M  None N DARIUSZ   3 SAB 08     AB, COMPLETE      2 SAB            1 SAB               . Medical history:   Past Medical History:   Diagnosis Date     Anxiety      Manic depressive disorder (H)      Severe recurrent majr depression w/psychotc features, mood-incongruent (H) 2017    Hallucinations - visual and auditory     Trauma in childhood    . Gestational Age 39w4d. VSS. Fetal movement present. Patient denies backache, vaginal discharge, pelvic pressure, UTI symptoms, GI problems, vaginal bleeding, edema, headache, visual disturbances, epigastric or URQ pain, abdominal pain. C/o possible ROM at approx 0145. Also c/o some bloody show with lower uterine cramping. Support person present.  Action: Verbal consent for EFM. Triage assessment completed. EFM applied for fetal well-being. Uterine assessment done via TOCO, ctxs every 3-5 minutes. Fetal assessment: Presumed adequate fetal oxygenation documented (see flow record). ROM plus collected and sent.  Response: Caridad Che CNM informed of pt arrival and positive ROM plus. Plan per provider is admit pt for labor. Patient verbalized agreement with plan. Patient transferred to room 442 ambulatory, oriented to room and call light.

## 2018-11-30 NOTE — PROGRESS NOTES
"  S:Pt is awake. Reports no significant contractions. Partner in room. Discussed pitocin induction for SROM x7 hours with no labor. Pt interested in getting things moving.     O:  Blood pressure 110/56, pulse 77, temperature 98  F (36.7  C), temperature source Oral, resp. rate 16, height 1.6 m (5' 3\"), weight 72.6 kg (160 lb), last menstrual period 03/05/2018, not currently breastfeeding.  General appearance: comfortable  CONTRACTIONS: rare  Palpate: not felt by patient  FETAL HEART TONES: IA with doppler, no decreases heard  ROM: clear fluid  PELVIC EXAM:3/50/-2/soft/mid-position  [place]: 7  Fetal Position:  vertex  Bloody show: No  Pitocin- none,  Antibiotics- none    ASSESSMENT:  ==============  IUP @ 39w4d rupture of membranes with no labor   Fetal Heart rate tracing Category one  GBS- negative     PLAN:  ===========  Comfort measures prn - encouraged upright positioning  Pain medication - per patient request. She is not planning on using any medication.  Obtain IV access  Labor induction with Pitocin  Reevaluate in 2-4 hours/PRN   Ebony Boyd CNM  "

## 2018-11-30 NOTE — PROGRESS NOTES
"  S:Have checked on patient multiple times over the last 3-4 hours. She appears comfortable, laughing, happy. She has been eating a normal diet. Reports occasionally feeling pelvic pressure but nothing consistent.     O:  Blood pressure 119/70, pulse 77, temperature 97.9  F (36.6  C), temperature source Oral, resp. rate 16, height 1.6 m (5' 3\"), weight 72.6 kg (160 lb), last menstrual period 03/05/2018, not currently breastfeeding.  General appearance: comfortable    CONTRACTIONS: Contractions every 2.5-4 minutes.  Palpate: mild  FETAL HEART TONES: baseline 145 with moderate FHR variability and    accelerations yes. Decelerations no.    NST: REACTIVE  ROM: clear fluid  PELVIC EXAM:deferred  Fetal Position:  vertex  Bloody show: No  Pitocin- 11 mu/min.,  Antibiotics- none    ASSESSMENT:  ==============  IUP @ 39w4d SROM with no labor  Induction with pitocin  SROM x16 hours   Fetal Heart rate tracing Category one  GBS- negative     PLAN:  ===========  Comfort measures prn - pt now with tele monitor to allow for walking in the halls  Labor induction with Pitocin; continue to titrate per unit protocol  Reevaluate in 3-4 hours or sooner prn  Ebony Boyd CNM  "

## 2018-11-30 NOTE — H&P
Sharmayne Morgan is a 23 year old female,  -American,   partnered,     Patient's last menstrual period was 2018 (exact date)., Estimated Date of Delivery: Dec 3, 2018 is calculated from lmp and verified with U/S     [facility] at 4:23 AM     in early labor and ruptured with no labor.  Membranes are ruptured since approx 0200 and verified with ROM plus.      HPI: Felt a big gush of fluid at about 0200 and has been leaking since.  Starting to feel more contractions.      PRENATAL COURSE  Prenatal care began at 6 wks gestation for a total of 14 prenatal visits.  Total wt gain 40  Prenatal vital signs WNL  Prenatal course was complicated by history of depression with psychotic features - has been receiving psychiatric care.      HISTORIES  Allergies   Allergen Reactions     Seasonal Allergies      Past Medical History:   Diagnosis Date     Anxiety      Manic depressive disorder (H)      Severe recurrent majr depression w/psychotc features, mood-incongruent (H) 2017    Hallucinations - visual and auditory     Trauma in childhood      Past Surgical History:   Procedure Laterality Date     NO HISTORY OF SURGERY       Family History   Problem Relation Age of Onset     Substance Abuse Mother      Sarcoidosis Father      Heart Murmur Father      Schizophrenia Father      Stillborn Brother      Attention Deficit Disorder Son      Social History   Substance Use Topics     Smoking status: Former Smoker     Smokeless tobacco: Never Used     Alcohol use No     Obstetric History       T2      L2     SAB3   TAB0   Ectopic0   Multiple0   Live Births2       # Outcome Date GA Lbr Billy/2nd Weight Sex Delivery Anes PTL Lv   7 Current            6 AB 17 8w5d          5 Term 14 40w0d  3.005 kg (6 lb 10 oz) F  None N DARIUSZ   4 Term 09 41w0d  3.657 kg (8 lb 1 oz) M  None N DARIUSZ   3 SAB 08     AB, COMPLETE      2 SAB            1 SAB       "             LABS:     Lab Results   Component Value Date    ABO A 2018       Lab Results   Component Value Date    RH Pos 2018     Rhogam not indicated  Rubella immune   Treponema Pallidum Antibody  Negative    HIV    Non-reactive   Lab Results   Component Value Date    HGB 10.6 2018      Lab Results   Component Value Date    HEPBANG Nonreactive 2018     Lab Results   Component Value Date    GBS Negative 2018       ROS  Pt denies significant constitutional symptoms including fever and/or malaise.  Pt denies significant respiratory, cardiovacular, GI, or muscular/skeletal complaints.      PHYSICAL EXAM:  /70  Pulse 77  Temp 98.1  F (36.7  C) (Oral)  Resp 18  Ht 1.6 m (5' 3\")  Wt 72.6 kg (160 lb)  LMP 2018 (Exact Date)  BMI 28.34 kg/m2  General appearance healthy, alert, active, no distress, cooperative and smiling   Neuro:  denies headache and visual disturbances  Psych: Mentation normal and bright   Legs: 2+/2+, no clonus, no edema       Abdomen: gravid, single vertex fetus (confirmed with BSUS), non-tender, EFW 8 lbs. Pt is jacque every 3-6 minutes, lasting 60 seconds and palpates mild    FETAL HEART TONES: baseline 135 with moderate FHRV variability and accelerations.  No decelerations present.     PELVIC EXAM: Deferred, offered and declined by patient at this time.    BLOODY SHOW:: no  Membranes as listed above    ASSESSMENT:  IUP @ 39 wks gestation  in early labor, ruptured   NST  reactive   Parity: Multip  GBS negative and membranes ruptured for 2 hours  History of depression with psychosis    PLAN:  Admit - see IP orders  Pain medication prn.  Does not plan on using pain medication.    Anticipate   Pt is on medicare -  No    Caridad Che, DNP, APRN, CNM      "

## 2018-11-30 NOTE — PROVIDER NOTIFICATION
11/30/18 0645   Provider Notification   Provider Name/Title ROCK Mclean   Method of Notification In Department     Notified provider of spacing of contractions. OK to move to hourly intermittent auscultation, will adjust as labor phase progresses.

## 2018-11-30 NOTE — IP AVS SNAPSHOT
MRN:[id number]                      After Visit Summary   11/30/2018    Sharmayne Morgan    MRN: [id number]           Thank you!     Thank you for choosing [facility] for your care. Our goal is always to provide you with excellent care. Hearing back from our patients is one way we can continue to improve our services. Please take a few minutes to complete the written survey that you may receive in the mail after you visit with us. Thank you!        Patient Information     Date Of Birth          1995        Designated Caregiver       Most Recent Value    Caregiver    Will someone help with your care after discharge? no    Name of designated caregiver NA    Phone number of caregiver NA      About your hospital stay     You were admitted on:  November 30, 2018 You last received care in the:  [facility]    You were discharged on:  December 2, 2018       Who to Call     For medical emergencies, please call 911.  For non-urgent questions about your medical care, please call your primary care provider or clinic, [phone]          Attending Provider     Provider Specialty    Caridad Che, [facility]    Ebony Boyd APRN [facility]       Primary Care Provider Office Phone # Fax #    [facility] [phone]      Further instructions from your care team       Postpartum Vaginal Delivery Instructions    Activity       Ask family and friends for help when you need it.    Do not place anything in your vagina for 6 weeks.    You are not restricted on other activities, but take it easy for a few weeks to allow your body to recover from delivery.  You are able to do any activities you feel up to that point.    No driving until you have stopped taking your pain medications (usually two weeks after delivery).     Call your health care provider if you have any of these symptoms:       Increased pain, swelling, redness, or fluid around your stiches from an episiotomy  "or perineal tear.    A fever above 100.4 F (38 C) with or without chills when placing a thermometer under your tongue.    You soak a sanitary pad with blood within 1 hour, or you see blood clots larger than a golf ball.    Bleeding that lasts more than 6 weeks.    Vaginal discharge that smells bad.    Severe pain, cramping or tenderness in your lower belly area.    A need to urinate more frequently (use the toilet more often), more urgently (use the toilet very quickly), or it burns when you urinate.    Nausea and vomiting.    Redness, swelling or pain around a vein in your leg.    Problems breastfeeding or a red or painful area on your breast.    Chest pain and cough or are gasping for air.    Problems coping with sadness, anxiety, or depression.  If you have any concerns about hurting yourself or the baby, call your provider immediately.     You have questions or concerns after you return home.     Keep your hands clean:  Always wash your hands before touching your perineal area and stitches.  This helps reduce your risk of infection.  If your hands aren't dirty, you may use an alcohol hand-rub to clean your hands. Keep your nails clean and short.        Pending Results     No orders found from 11/28/2018 to 12/1/2018.            Admission Information     Date & Time Provider Department Dept. Phone    11/30/2018 Ebony Boyd APRN CNM [facility] [phone]      Your Vitals Were     Blood Pressure Pulse Temperature Respirations Height Weight    112/68 75 98.4  F (36.9  C) (Oral) 16 1.6 m (5' 3\") 72.6 kg (160 lb)    Last Period BMI (Body Mass Index)                03/05/2018 (Exact Date) 28.34 kg/m2          Care EveryWhere ID     This is your Care EveryWhere ID. This could be used by other organizations to access your [facility] medical records  [id number]        Equal Access to Services     TRISTON [place] AH: Donny Ley, jonathan sadler, qacalli agudelo, oralia lambert " lazarus gonzalez'aan ah. So [facility] [phone].    ATENCIÓN: Si erin sorto, tiene a horn disposición servicios gratuitos de asistencia lingüística. Marni al [phone].    We comply with applicable federal civil rights laws and [place] laws. We do not discriminate on the basis of race, color, national origin, age, disability, sex, sexual orientation, or gender identity.               Review of your medicines      CONTINUE these medicines which have NOT CHANGED        Dose / Directions    Benzoyl Peroxide 6 % Lotn   Commonly known as:  BENZOYL PEROXIDE CLEANSER        Externally apply topically daily as needed (acne)   Quantity:  170.3 g   Refills:  3       clindamycin 1 % external solution   Commonly known as:  CLEOCIN T   Used for:  Hydradenitis        Apply topically 2 times daily   Quantity:  60 mL   Refills:  11       clindamycin phos-benzoyl perox 1.2-2.5 % external gel   Commonly known as:  ACANYA   Used for:  Hydradenitis        Dose:  0.5 inch   Externally apply 0.5 inches topically daily Apply a pea sized amount to each affected area of the face   Quantity:  50 g   Refills:  11       hydrocortisone 2.5 % cream   Commonly known as:  ANUSOL-HC   Used for:  External hemorrhoids        Place rectally 2 times daily as needed for hemorrhoids   Quantity:  30 g   Refills:  0       prenatal multivitamin w/iron 27-0.8 MG tablet   Indication:  Pregnancy   Used for:  Supervision of normal pregnancy in second trimester        Dose:  1 tablet   Take 1 tablet by mouth daily   Quantity:  100 tablet   Refills:  1         STOP taking     doxylamine 25 MG Tabs tablet   Commonly known as:  UNISOM           ondansetron 4 MG ODT tab   Commonly known as:  ZOFRAN ODT           pyridOXINE 50 MG tablet   Commonly known as:  [employer] VITAMIN B-6           ranitidine 150 MG tablet   Commonly known as:  ZANTAC                    Protect others around you: Learn how to safely use, store and throw away your medicines at www.disposemymeds.org.              Medication List: This is a list of all your medications and when to take them. Check marks below indicate your daily home schedule. Keep this list as a reference.      Medications           Morning Afternoon Evening Bedtime As Needed    Benzoyl Peroxide 6 % Lotn   Commonly known as:  BENZOYL PEROXIDE CLEANSER   Externally apply topically daily as needed (acne)                                clindamycin 1 % external solution   Commonly known as:  CLEOCIN T   Apply topically 2 times daily                                clindamycin phos-benzoyl perox 1.2-2.5 % external gel   Commonly known as:  ACANYA   Externally apply 0.5 inches topically daily Apply a pea sized amount to each affected area of the face                                hydrocortisone 2.5 % cream   Commonly known as:  ANUSOL-HC   Place rectally 2 times daily as needed for hemorrhoids                                prenatal multivitamin w/iron 27-0.8 MG tablet   Take 1 tablet by mouth daily

## 2018-11-30 NOTE — PROGRESS NOTES
"Late entry    S:Patient appears comfortable. Smiling, laughing and eating. Reports she doesn't notice any contractions, similar to last labor.    O:  Blood pressure 119/70, pulse 77, temperature 97.9  F (36.6  C), temperature source Oral, resp. rate 16, height 1.6 m (5' 3\"), weight 72.6 kg (160 lb), last menstrual period 03/05/2018, not currently breastfeeding.  General appearance: comfortable    CONTRACTIONS: Contractions every 3-5 minutes.  Palpate: mild  FETAL HEART TONES: baseline 135 with moderate FHR variability and    accelerations yes. Decelerations no.    NST: REACTIVE  ROM: clear fluid  PELVIC EXAM:deferred  Fetal Position:  vertex  Bloody show: No  Pitocin- 4 mu/min.,  Antibiotics- none    ASSESSMENT:  ==============  IUP @ 39w4d SROM with no labor  Induction with pitocin   Fetal Heart rate tracing category one  GBS- negative     PLAN:  ===========  Comfort measures prn - encouraged walking, warm showers prn  Labor induction with Pitocin; continue to titrate per unit protocol  Reevaluate in 2-4 hours/PRN   Ebony Boyd CNM  "

## 2018-11-30 NOTE — PLAN OF CARE
Problem: Patient Care Overview  Goal: Plan of Care/Patient Progress Review  Outcome: No Change  VSS. Pt is afebrile. States she feels like contractions have spaced since arrival. Coping well with contractions, has use of birthing ball.  FOB present and supportive.

## 2018-11-30 NOTE — IP AVS SNAPSHOT
[facility]    [address] [address]    Phone:  [phone]                                       After Visit Summary   11/30/2018    Sharmayne Morgan    MRN: [id number]           After Visit Summary Signature Page     I have received my discharge instructions, and my questions have been answered. I have discussed any challenges I see with this plan with the nurse or doctor.    ..........................................................................................................................................  Patient/Patient Representative Signature      ..........................................................................................................................................  Patient Representative Print Name and Relationship to Patient    ..................................................               ................................................  Date                                   Time    ..........................................................................................................................................  Reviewed by Signature/Title    ...................................................              ..............................................  Date                                               Time          22EPIC Rev 08/18

## 2018-12-01 LAB — HGB BLD-MCNC: 9.5 G/DL (ref 11.7–15.7)

## 2018-12-01 PROCEDURE — 25000132 ZZH RX MED GY IP 250 OP 250 PS 637: Performed by: ADVANCED PRACTICE MIDWIFE

## 2018-12-01 PROCEDURE — 25000132 ZZH RX MED GY IP 250 OP 250 PS 637: Performed by: PSYCHIATRY & NEUROLOGY

## 2018-12-01 PROCEDURE — 12000032 ZZH R&B OB CRITICAL UMMC

## 2018-12-01 PROCEDURE — 36415 COLL VENOUS BLD VENIPUNCTURE: CPT | Performed by: ADVANCED PRACTICE MIDWIFE

## 2018-12-01 PROCEDURE — 85018 HEMOGLOBIN: CPT | Performed by: ADVANCED PRACTICE MIDWIFE

## 2018-12-01 RX ORDER — QUETIAPINE FUMARATE 25 MG/1
25 TABLET, FILM COATED ORAL 2 TIMES DAILY
Status: DISCONTINUED | OUTPATIENT
Start: 2018-12-01 | End: 2018-12-02 | Stop reason: HOSPADM

## 2018-12-01 RX ORDER — ESCITALOPRAM OXALATE 10 MG/1
10 TABLET ORAL DAILY
Status: DISCONTINUED | OUTPATIENT
Start: 2018-12-01 | End: 2018-12-02 | Stop reason: HOSPADM

## 2018-12-01 RX ADMIN — SENNOSIDES AND DOCUSATE SODIUM 1 TABLET: 8.6; 5 TABLET ORAL at 09:19

## 2018-12-01 RX ADMIN — QUETIAPINE FUMARATE 25 MG: 25 TABLET ORAL at 19:42

## 2018-12-01 RX ADMIN — ESCITALOPRAM OXALATE 10 MG: 10 TABLET ORAL at 15:41

## 2018-12-01 RX ADMIN — IBUPROFEN 800 MG: 800 TABLET ORAL at 18:30

## 2018-12-01 RX ADMIN — IBUPROFEN 800 MG: 800 TABLET ORAL at 12:33

## 2018-12-01 RX ADMIN — IBUPROFEN 800 MG: 800 TABLET ORAL at 06:12

## 2018-12-01 RX ADMIN — SENNOSIDES AND DOCUSATE SODIUM 1 TABLET: 8.6; 5 TABLET ORAL at 19:42

## 2018-12-01 NOTE — PLAN OF CARE
Pt up to bathroom with standby assist of 1. Able to void without difficulty. Pericares provided, peripad applied. New gown given. Pt assisted to wc. Tolerating ambulation well. Moved to room 7131. Report given to Petra VASQUEZ. Bands verified with . Pt oriented to room and call system. Encouraged to call with questions or concerns.

## 2018-12-01 NOTE — PLAN OF CARE
Problem: Labor (Cervical Ripen, Induct, Augment) (Adult,Obstetrics,Pediatric)  Goal: Signs and Symptoms of Listed Potential Problems Will be Absent, Minimized or Managed (Labor)  Signs and symptoms of listed potential problems will be absent, minimized or managed by discharge/transition of care (reference Labor (Cervical Ripen, Induct, Augment) (Adult,Obstetrics,Pediatric) CPG).   Outcome: Completed Date Met: 18   boy at 2140. Mother and baby in stable condition

## 2018-12-01 NOTE — PLAN OF CARE
Problem: Patient Care Overview  Goal: Plan of Care/Patient Progress Review  Outcome: No Change  Patient arrived to [facility] unit via wheelchair at 0000,with belongings, accompanied by spouse/ significant other, with infant in arms. Received report from Jessi Butler RN,  and checked bands. Unit and room orientation started. Call light given and within arms reach; no concerns present at this time. Continue with plan of care.

## 2018-12-01 NOTE — PROGRESS NOTES
Advanced Practice Assessment    Requested by Ebony Boyd CNM to assess this term male infant with gestational age of 39 4/7 weeks after delivery. Per L&D RN, infant delivered via vaginal delivery with spontaneous respirations after stimulation at mother's abdomen. Apgars were 7 at 1 minute and 9 at 5 minutes. NICU called to assess infant due to grunting that has persisted after delivery. Upon arrival, infant was on the radiant warmer, appeared alert and active with a pulse oximeter applied reading SpO2 85 - 92 %. Infant was intermittently grunting. Infant was suctioned for small amount of secretions. CPAP PEEP 5 FiO2 21% administered for approximately 4 minutes. SpO2 increased to > 95% and infant was breathing comfortably without signs of respiratory distress. CPAP was discontinued. SpO2 was maintained > 95% off of CPAP and infant's respiratory status remained stable. Upon physical examination, infant was alert, active, pink and well-perfused, cueing to feed with hands to mouth and sucking on finger. Breath sounds clear with good aeration bilaterally. No retractions, nasal flaring or grunting noted. SpO2 maintained greater than 95%. L&D RN and parents present and in agreement with plan for infant to remain in their care and to transfer infant to [facility]. Instructed RN to notify NICU if infant develops signs of respiratory distress.    Malia Butler PA-C  10:54 PM 2018   Advanced Practice Provider  [facility]

## 2018-12-01 NOTE — PLAN OF CARE
Problem: Patient Care Overview  Goal: Plan of Care/Patient Progress Review  Outcome: Improving  Pt is stable. Vital signs stable and FF at U/U with small lochia. Pt is voiding, had a shower and is independent with baby's cares. Pt is bonding very well with baby. Breastfeeding well with minimal assist holding baby to bring up for latching.  Nipples are in tact per pt. Complains of bilateral hip pain, but ambulating in the room with no problem. Medicated pt with Ibuprofen for pain control. Encouraged to call staff to check latch. Continue cares.

## 2018-12-01 NOTE — PLAN OF CARE
Problem: Patient Care Overview  Goal: Plan of Care/Patient Progress Review  Outcome: Therapy, progress toward functional goals as expected  VSS. Postpartum assessment WNL. Denies any significant pain. Demonstrated hand expression and assisted with breastfeeding. Baby latches on well. She is voiding and walking with no problems. She has not yet passed gas. No concerns at this time. Continue plan of care.

## 2018-12-01 NOTE — PROGRESS NOTES
Post Partum Note  SIGNIFICANT PROBLEMS:  Patient Active Problem List    Diagnosis Date Noted     Encounter for triage in pregnant patient 2018     Priority: Medium     Normal labor and delivery 2018     Priority: Medium      (normal spontaneous vaginal delivery) 2018     Priority: Medium     Anemia 2018     Priority: Medium     9.7 at 24 weeks. RX for iron sent.        Mood disorder of depressed type 2018     Priority: Medium     Supervision of other normal pregnancy, antepartum 2018     Priority: Medium     FOB Alfredo  10/17/18 consider;  social work consult postpartum for depression, pt informed plan 2 and 6 week postpartum visits.  Please make appointments for patient if able before discharge.     7/10/18 fetal survey WNL    2018 Reviewed patient chart and plan with OB/GYNs at [facility]   2018 Reviewed patient chart and plan with OB/GYNs at [facility]  2018 Reviewed patient chart and plan with OB/GYNs at [facility]  2018 Reviewed patient chart and plan with OB/GYNs at [facility]  10/23/2018 Reviewed high risk list with Dr. Chacko. Please discuss and document birth control discussion with patient.        Need for Tdap vaccination 2018     Priority: Medium     Dry eyes, bilateral 2017     Priority: Medium     Last Assessment & Plan:   - Dry eyes bilaterally, right > left   - Start artifical tear drops QID and PRN   - Monitor; to return if symptoms do not improve as anticipated       Hyperopia, bilateral 2017     Priority: Medium     Last Assessment & Plan:   - Distance VA is 20/20 uncorrected  - Recommended glasses for reading, seeing up close  - Discussed results of refraction with patient and new glasses RX dispensed       Severe episode of recurrent major depressive disorder, with psychotic features (H) 2014     Priority: Medium     Hallucinations - visual and auditory:  Has been on Zoloft 50 and Zyprexa 10 mg prior to pregnancy.  Failed Klonopin (angry)  "and Risperidone.  10/17/18 patient is changing from zyprexa (swelling and dehydration) to seraquel slow wean, pt is being followed by psych for these changes.         Hydradenitis 02/18/2014     Priority: Medium     Overview:   In groin/vulva  Receiving care from derm (appt 2/18/14)         INTERVAL HISTORY:  /69  Pulse 66  Temp 98.2  F (36.8  C) (Oral)  Resp 16  Ht 1.6 m (5' 3\")  Wt 72.6 kg (160 lb)  LMP 03/05/2018 (Exact Date)  Breastfeeding? Unknown  BMI 28.34 kg/m2  Pt stable, baby is rooming in  Breast feeding status:initiated  Complications since 2 hours post delivery: None  Patient is tolerating acitivity well Voiding without difficulty, cramping is relieved by Ibuprophen, lochia is decreasing and patient denies clots.  Perineal pain is is relieved by Ibuprophen.  The perineum is intact    Postpartum hemoglobin   Hemoglobin   Date Value Ref Range Status   12/01/2018 9.5 (L) 11.7 - 15.7 g/dL Final     Blood type   Lab Results   Component Value Date    ABO A 11/30/2018       Lab Results   Component Value Date    RH Pos 11/30/2018     History of depression: Yes, sees Gareth - says that they have a plan to follow up with Psych and restart meds     ASSESSMENT/PLAN:  Normal postpartum exam   Stable Post-partum day #1  Complications:hx of psychosis, has been under care of [facility].  Reports not concerns right now, feels \"mellow\"  Plan d/c home tomorrow  RTC 6 weeks  Teaching done: D/C Instructions: Nutrition/Activity, Engorgement Management, Birth Control Options, Warning Signs/When to Call: Excessive Bleeding, Infection, PP Depression, Kegals and Crunches, RTC Clinic for PP Appointment, PNV and Iron supplemenation  Birthcontrol planned:Depoprovera  Current Discharge Medication List      CONTINUE these medications which have NOT CHANGED    Details   Prenatal Vit-Fe Fumarate-FA (PRENATAL MULTIVITAMIN PLUS IRON) 27-0.8 MG TABS per tablet Take 1 tablet by mouth daily  Qty: 100 tablet, Refills: 1 " "   Associated Diagnoses: Supervision of normal pregnancy in second trimester      Benzoyl Peroxide (BENZOYL PEROXIDE CLEANSER) 6 % LOTN Externally apply topically daily as needed (acne)  Qty: 170.3 g, Refills: 3      clindamycin (CLEOCIN T) 1 % solution Apply topically 2 times daily  Qty: 60 mL, Refills: 11    Associated Diagnoses: Hydradenitis      Clindamycin Phos-benzoyl Perox (ACANYA) 1.2-2.5 % GEL Externally apply 0.5 inches topically daily Apply a pea sized amount to each affected area of the face  Qty: 50 g, Refills: 11    Associated Diagnoses: Hydradenitis      hydrocortisone (ANUSOL-HC) 2.5 % cream Place rectally 2 times daily as needed for hemorrhoids  Qty: 30 g, Refills: 0    Associated Diagnoses: External hemorrhoids         STOP taking these medications       doxylamine (UNISOM) 25 MG TABS tablet Comments:   Reason for Stopping:         ondansetron (ZOFRAN ODT) 4 MG ODT tab Comments:   Reason for Stopping:         pyridOXINE ([employer] VITAMIN B-6) 50 MG tablet Comments:   Reason for Stopping:         ranitidine (ZANTAC) 150 MG tablet Comments:   Reason for Stopping:             Talk with Sharmayne about her mental health.  She said that she stopped her meds at the end of her pregnancy with support from her mental health provider. She says that their plan was to reassess after birth and restart meds.  She is planning to call [facility] and schedule an appt.  She reports feeling \"mellow.\"  She has not mental health concerns at this time.  She has been working and taking care of her 2 children throughout pregnancy.  Her affect and eye contact are appropriate.  She appears very caring to her 3 children.  Psych and Social Work consults are also ordered to offer any additional support/services that she might need.      She plans on restarting Depo once breastfeeding is established.  She and her partner Adonis verbalized understanding that they should use condoms if they are sexually active before the depo shot.  " The depo shot was offered at discharge and declined.  She knows that it can be given at 6 weeks at her Postpartum appt or at any time earlier if she would like.  Adonis also listened carefully and verbalized understanding and agreement with the plan.    She knows that the postpartum appt is usually at 6 weeks, but she is welcome to call and/or make an appt and see us earlier with any questions or concerns.      Sharmayne has ibuprofen, colace and iron at home.  She declines any discharge meds.      Caridad Che, DNP, APRN, CNM

## 2018-12-01 NOTE — L&D DELIVERY NOTE
Delivery Summary  Called to room with patients increased sense of pelvic pressure and urge to bear down. Initial cervical exam was thick rim of cervix felt along patient's right side, no cervix felt on left and fetal station still high. Over the next 20 minutes she breathed through her contractions until she felt a strong urge to push. She pushed effectively over about 15 minutes to the birth of a baby boy. He was placed skin to skin and with some stimulation was vigorous. Sharmayne was relieved to be done.   IUP at 39 weeks 4 days gestation delivered on 18 @ 2140.     delivery of a viable male infant; weight pending.  Apgars of 7 at 1 minute and 9 at 5 minutes.  Labor was induced with pitocin after SROM with no labor.  Medications administered  in labor:  Pain Rx none; Antibiotics No  Perineum: Intact  Placenta-mechanism: spontaneous, intact,  with a 3 vessel cord. IV oxytocin was given.  QBL was 500.  Complications of pregnancy, labor and delivery: ROM > 18 hours, ROM with no labor  Birth attendants: Molly M. Lehmann, CNM Sharmayne Morgan MRN# [id number]   Age: 23 year old YOB: 1995     Labor Event Times:    Labor Onset Date       Labor Onset Time    Dilation Complete Date    Dilation Complete Time       Start Pushing Date        Start Pushing Time            Labor Length:    1st Stage (hrs/min) 0.00 39.00   2nd Stage (hrs/min) 2.00 30.00   3rd Stage (hrs/min) 0.00 9.00       Labor Events:     Labor No   Rupture Date     Rupture Time     Rupture Type Artificial Rupture of Membranes   Fluid Color     Labor Type     Induction    Induction Indication         Augmentation    Labor Complications     Additional Complications     Management of Labor        Antibiotics     IV Antibiotic Given     Additional Management     Fetal Status Prior to  Delivery     Fetal Status Comments         Cervical Ripening:    Date     Time     Type         Delivery:    Episiotomy None   Local Anesthetic         Lacerations None   Sponge Count Correct       Needle Count Correct     Final Count by:    Sutures     Blood loss (ml)    Packing Intentionally Left In     Number     Comments           Information for the patient's :  Morgan, Baby1 Sharmayne [[id number]]       Delivery  2018 9:40 PM by  Vaginal, Spontaneous Delivery  Sex:  male Gestational Age: 39w4d  Delivery Clinician:     Living?:            APGARS  One minute Five minutes Ten minutes   Skin color:            Heart rate:            Grimace:            Muscle tone:            Breathing:            Totals: 7  9         Presentation/position:           Resuscitation and Interventions: Method:  None  Oxygen Type:     Intubation Time:   # of Attempts:     ETT Size:        Tracheal Suction:     Tracheal returns:      [place] Care at Delivery:  Baby boy born @ 2140. Placed on mom's chest. Stimulated with warm blankets and bulb suctioning. Stable bonding behaviors exhibited.         Cord information:     Disposition of cord blood:      Blood gases sent?    Complications:     Placenta: Delivered:           appearance.  Comments: intact.  Disposition: Hospital disposal  [place] Measurements:  Weight:    Height:    Head circumference:    Chest circumference:     Temperature:     Other providers:       Additional  information:  Forceps:    Verbal Informed Consent Obtained:       Alternative Labor Strategies Discussed:     Emergency Resources Available:       Type:       Accrued Pulling Time:       # of Pulls:      Position:     Fetal Station:       Indications:      Other Indications:     Operative Vaginal Delivery Brief Note Forceps:        Vacuum:    Verbal Informed Consent Obtained:     Alternative Labor Strategies Discussed:     Emergency Resources Available:     Type:      Accrued Pulling Time:       # of Pop-Offs:       # of Pulls:       Position:     Fetal Station:      Indications for Vacuum:       Other Indications:    Operative Vaginal Delivery  Brief Note Vacuum:        Shoulder Dystocia Shoulder Dystocia    Fetal Tracing Prior to Delivery:  Category 2   Fetal Tracing Comments:  terminal bradycardia; cord gases sent   Shoulder dystocia present?:  No                                            Breech:       : Type:     Indications for Primary:     Indications for Secondary:     Other Indications:        Observed anomalies     Output in Delivery Room: Stool

## 2018-12-01 NOTE — PLAN OF CARE
Problem: Patient Care Overview  Goal: Plan of Care/Patient Progress Review  Outcome: Improving  Vaginal Delivery Note   of viable Male with Ebony Boyd CNM and Caridad Nash RN in attendance.  Nursery RN Marisol Moore present.  Infant with spontaneous cry, to mother's abdomen, dried and stimulated.  APGAR at 1 minute:  7 and APGAR at 5 minutes:  9.  Placenta delivered with out complication, pitocin infused per order set, no laceration, no repair, elisabeth cares provided.  Mother and baby in stable condition.
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
## 2018-12-01 NOTE — PROGRESS NOTES
"  S:Pt feeling increased pelvic pressure and requesting cervical exam.     O:  Blood pressure 119/70, pulse 77, temperature 97.9  F (36.6  C), temperature source Oral, resp. rate 16, height 1.6 m (5' 3\"), weight 72.6 kg (160 lb), last menstrual period 03/05/2018, not currently breastfeeding.  General appearance: comfortable but more focused during contraction    CONTRACTIONS: Contractions every 2-3.5 minutes.  Palpate: moderate  FETAL HEART TONES: baseline 140 with moderate FHR variability and    accelerations yes. Decelerations no.    NST: REACTIVE  ROM: clear fluid, AROM of forebag  PELVIC EXAM:6/70/-1  Fetal Position:  vertex  Bloody show: No  Pitocin- 11 mu/min.,  Antibiotics- none    ASSESSMENT:  ==============  IUP @ 39w4d active labor  SROM x17 hours  Pitocin induction for SROM without labor   Fetal Heart rate tracing category one  GBS- negative     PLAN:  ===========  Comfort measures prn - continue with position changes as desired  Labor induction with Pitocin; titrate per protocol  Reevaluate in 2-3 hours or sooner prn  Ebony Boyd CNM  "

## 2018-12-01 NOTE — CONSULTS
Patient seen and chart reviewed. Note dictated (initial)   I have ordered Lexapro 10 mg to take 1 per day  And Seroquel 25 mg at 2000. Would discharge on 25 mg to take 1-2 HS   To schedule appointment with Dr Miguel A Finch for psychiatric follow up   page me at [phone] as needed

## 2018-12-02 VITALS
DIASTOLIC BLOOD PRESSURE: 68 MMHG | SYSTOLIC BLOOD PRESSURE: 112 MMHG | WEIGHT: 160 LBS | HEIGHT: 63 IN | TEMPERATURE: 98.4 F | BODY MASS INDEX: 28.35 KG/M2 | RESPIRATION RATE: 16 BRPM | HEART RATE: 75 BPM

## 2018-12-02 PROCEDURE — 25000132 ZZH RX MED GY IP 250 OP 250 PS 637: Performed by: PSYCHIATRY & NEUROLOGY

## 2018-12-02 PROCEDURE — 25000132 ZZH RX MED GY IP 250 OP 250 PS 637: Performed by: ADVANCED PRACTICE MIDWIFE

## 2018-12-02 RX ADMIN — IBUPROFEN 800 MG: 800 TABLET ORAL at 01:34

## 2018-12-02 RX ADMIN — QUETIAPINE FUMARATE 25 MG: 25 TABLET ORAL at 07:47

## 2018-12-02 RX ADMIN — IBUPROFEN 800 MG: 800 TABLET ORAL at 07:46

## 2018-12-02 RX ADMIN — SENNOSIDES AND DOCUSATE SODIUM 2 TABLET: 8.6; 5 TABLET ORAL at 07:46

## 2018-12-02 RX ADMIN — ESCITALOPRAM OXALATE 10 MG: 10 TABLET ORAL at 07:49

## 2018-12-02 NOTE — PLAN OF CARE
Problem: Patient Care Overview  Goal: Plan of Care/Patient Progress Review  Outcome: Adequate for Discharge Date Met: 12/02/18  Data: Vital signs stable and postpartum assessments within normal limits. Pt is breastfeeding well independently, but needs reminder to have control of the baby whole body and not only holding the head to latch. Nipples are in tact and no pain per pt.  Denies pain.   Action: pt medicated with Ibuprofen for pain control. Checked latch and flange baby's lip. Reviewed with pt about holding techniques when latching baby.  Review of care plan, teaching, and discharge instructions done with pt in the discharge class. Infant identification with ID bands done, pt verification with signature obtained. Pt reminded to follow up with her psychiatry. Pt was given copies of the discharge papers.  Response: pt states understanding and comfort with infant cares and feeding. All questions about baby care addressed. pt discharged home today with baby in a car seat.

## 2018-12-02 NOTE — PLAN OF CARE
Problem: Patient Care Overview  Goal: Plan of Care/Patient Progress Review  Outcome: Improving  VSS. Postpartum assessment WNL. C/o some cramping with breastfeeding and occasional perineal discomfort after activity. Well controlled with ibuprofen. Encouraged tub soak and use of tucks pads. Breastfeeding independently but some assistance offered to encourage baby to latch well. Education on hand expression given. Pt states she has a breast pump at home already. Encouraged pt to watch educational videos and complete birth certificate and ROP paperwork. EDS of 22. Providers notified and social work consult ordered, although pt already seen by psychiatry. Significant other present and attentive.

## 2018-12-02 NOTE — DISCHARGE SUMMARY
Post Partum Note  SIGNIFICANT PROBLEMS:  Patient Active Problem List    Diagnosis Date Noted     Encounter for triage in pregnant patient 2018     Priority: Medium     Normal labor and delivery 2018     Priority: Medium      (normal spontaneous vaginal delivery) 2018     Priority: Medium     Anemia 2018     Priority: Medium     9.7 at 24 weeks. RX for iron sent.        Mood disorder of depressed type 2018     Priority: Medium     Supervision of other normal pregnancy, antepartum 2018     Priority: Medium     FOB Alfredo  10/17/18 consider;  social work consult postpartum for depression, pt informed plan 2 and 6 week postpartum visits.  Please make appointments for patient if able before discharge.     7/10/18 fetal survey WNL    2018 [place]ed patient chart and plan with [place] [facility] [place]   2018 Reviewed patient chart and plan with OB/GYNs at [facility] [place]  2018 Reviewed patient chart and plan with OB/GYNs at [facility] [place]  2018 Reviewed patient chart and plan with OB/GYNs at [facility] [place]  10/23/2018 Reviewed high risk list with Dr. Chacko. Please discuss and document birth control discussion with patient.        Need for Tdap vaccination 2018     Priority: Medium     Dry eyes, bilateral 2017     Priority: Medium     Last Assessment & Plan:   - Dry eyes bilaterally, right > left   - Start artifical tear drops QID and PRN   - Monitor; to return if symptoms do not improve as anticipated       Hyperopia, bilateral 2017     Priority: Medium     Last Assessment & Plan:   - Distance VA is 20/20 uncorrected  - Recommended glasses for reading, seeing up close  - Discussed results of refraction with patient and new glasses RX dispensed       Severe episode of recurrent major depressive disorder, with psychotic features (H) 2014     Priority: Medium     Hallucinations - visual and auditory:  Has been on Zoloft 50 and Zyprexa 10 mg prior to pregnancy.  Failed Klonopin (angry)  "and Risperidone.  10/17/18 patient is changing from zyprexa (swelling and dehydration) to seraquel slow wean, pt is being followed by psych for these changes.         Hydradenitis 02/18/2014     Priority: Medium     Overview:   In groin/vulva  Receiving care from derm (appt 2/18/14)         INTERVAL HISTORY:  /68  Pulse 75  Temp 98.4  F (36.9  C) (Oral)  Resp 16  Ht 1.6 m (5' 3\")  Wt 72.6 kg (160 lb)  LMP 03/05/2018 (Exact Date)  Breastfeeding? Unknown  BMI 28.34 kg/m2  Pt stable, baby is rooming in  Breast feeding status:initiated  Complications since 2 hours post delivery: None  Patient is tolerating acitivity well Voiding without difficulty, cramping is relieved by Ibuprophen, lochia is decreasing and patient denies clots.  Perineal pain is is relieved by Ibuprophen.  The perineum is intact    Postpartum hemoglobin   Hemoglobin   Date Value Ref Range Status   12/01/2018 9.5 (L) 11.7 - 15.7 g/dL Final     Blood type   Lab Results   Component Value Date    ABO A 11/30/2018       Lab Results   Component Value Date    RH Pos 11/30/2018     Rubella status No results found for: RUBELLAABIGG  History of depression: yes- history for mental illness.     ASSESSMENT/PLAN:  Normal postpartum exam   Consultation with Mental Health and has plan in place for close follow up.   Stable Post-partum day #1  Complications:none  Plan d/c home today  RTC 6 weeks  Teaching done: D/C Instructions: Nutrition/Activity, Engorgement Management, Birth Control Options, Warning Signs/When to Call: Excessive Bleeding, Infection, PP Depression, Kegals and Crunches, RTC Clinic for PP Appointment and PNV  Birthcontrol planned:Depoprovera  Current Discharge Medication List      CONTINUE these medications which have NOT CHANGED    Details   Prenatal Vit-Fe Fumarate-FA (PRENATAL MULTIVITAMIN PLUS IRON) 27-0.8 MG TABS per tablet Take 1 tablet by mouth daily  Qty: 100 tablet, Refills: 1    Associated Diagnoses: Supervision of normal " pregnancy in second trimester      Benzoyl Peroxide (BENZOYL PEROXIDE CLEANSER) 6 % LOTN Externally apply topically daily as needed (acne)  Qty: 170.3 g, Refills: 3      clindamycin (CLEOCIN T) 1 % solution Apply topically 2 times daily  Qty: 60 mL, Refills: 11    Associated Diagnoses: Hydradenitis      Clindamycin Phos-benzoyl Perox (ACANYA) 1.2-2.5 % GEL Externally apply 0.5 inches topically daily Apply a pea sized amount to each affected area of the face  Qty: 50 g, Refills: 11    Associated Diagnoses: Hydradenitis      hydrocortisone (ANUSOL-HC) 2.5 % cream Place rectally 2 times daily as needed for hemorrhoids  Qty: 30 g, Refills: 0    Associated Diagnoses: External hemorrhoids         STOP taking these medications       doxylamine (UNISOM) 25 MG TABS tablet Comments:   Reason for Stopping:         ondansetron (ZOFRAN ODT) 4 MG ODT tab Comments:   Reason for Stopping:         pyridOXINE ([employer] VITAMIN B-6) 50 MG tablet Comments:   Reason for Stopping:         ranitidine (ZANTAC) 150 MG tablet Comments:   Reason for Stopping:             Yokasta Brink CNM, APRN CNM    Yokasta Brink CNM,  CNM

## 2018-12-02 NOTE — PLAN OF CARE
Problem: Patient Care Overview  Goal: Plan of Care/Patient Progress Review  Outcome: Improving  Data: Vital signs within normal limits. Postpartum checks within normal limits - see flow record. Patient eating and drinking normally. Patient able to empty bladder independently and is up ambulating. No apparent signs of infection. Patient performing self cares and is able to care for infant. The patient had ED score of 22 and physician notified,  consult in, and psychiatrist came to see patient.  Action: Patient medicated during the shift for pain. See MAR. Patient reassessed within 1 hour after each medication and pain was improved - patient stated she was comfortable. Patient education done about breastfeeding and encouraged patient to watch education videos. See flow record.  Response: Positive attachment behaviors observed with infant. Support persons present.   Plan: Will continue plan of care.

## 2018-12-05 ENCOUNTER — DOCUMENTATION ONLY (OUTPATIENT)
Dept: CARE COORDINATION | Facility: CLINIC | Age: 23
End: 2018-12-05

## 2018-12-05 NOTE — PROGRESS NOTES
[facility] will be sharing updates with you on Maternal Child Health Referral requests for home care services.  This is for care coordination purposes and alert you to referral status.  We received the referral for  Sharmayne Morgan; MRN [id number] and want to update you:      [facility] has made three attempts to contact patient by phone and text message over the last four days.   We have not had any response from patient.  Final message was left advising patient to follow up with Primary Care Providers for mom and baby.     Sincerely [facility]  Savita Elizabeth  [phone]

## 2019-01-16 ENCOUNTER — OFFICE VISIT (OUTPATIENT)
Dept: PSYCHIATRY | Facility: CLINIC | Age: 24
End: 2019-01-16
Attending: PSYCHIATRY & NEUROLOGY
Payer: COMMERCIAL

## 2019-01-16 VITALS
DIASTOLIC BLOOD PRESSURE: 70 MMHG | BODY MASS INDEX: 24.27 KG/M2 | HEART RATE: 74 BPM | WEIGHT: 137 LBS | SYSTOLIC BLOOD PRESSURE: 115 MMHG

## 2019-01-16 DIAGNOSIS — F41.9 ANXIETY DISORDER, UNSPECIFIED TYPE: ICD-10-CM

## 2019-01-16 DIAGNOSIS — F29 PSYCHOSIS, UNSPECIFIED PSYCHOSIS TYPE (H): ICD-10-CM

## 2019-01-16 DIAGNOSIS — F33.3 SEVERE EPISODE OF RECURRENT MAJOR DEPRESSIVE DISORDER, WITH PSYCHOTIC FEATURES (H): Primary | ICD-10-CM

## 2019-01-16 PROCEDURE — G0463 HOSPITAL OUTPT CLINIC VISIT: HCPCS | Mod: ZF

## 2019-01-16 RX ORDER — QUETIAPINE FUMARATE 25 MG/1
TABLET, FILM COATED ORAL
Qty: 90 TABLET | Refills: 1 | Status: ON HOLD | OUTPATIENT
Start: 2019-01-16 | End: 2020-01-03

## 2019-01-16 RX ORDER — SERTRALINE HYDROCHLORIDE 25 MG/1
TABLET, FILM COATED ORAL
Qty: 60 TABLET | Refills: 1 | Status: SHIPPED | OUTPATIENT
Start: 2019-01-16 | End: 2019-01-28 | Stop reason: SINTOL

## 2019-01-16 ASSESSMENT — PAIN SCALES - GENERAL: PAINLEVEL: NO PAIN (0)

## 2019-01-16 ASSESSMENT — PATIENT HEALTH QUESTIONNAIRE - PHQ9: SUM OF ALL RESPONSES TO PHQ QUESTIONS 1-9: 23

## 2019-01-16 NOTE — PROGRESS NOTES
Psychiatry Clinic Medical Progress Note   Sharmayne Morgan is a 23 year old female who was referred for consultation by Silvio Montes for evaluation of psychosis on 09/06/18.   Will plan to engage in brief care with one follow up, with plan to transition back to the referring provider or a designated prescriber on completion of brief care.   Psych critical item history includes intrusive thoughts.    Interval History    History was provided by patient who was a good historian.   Things have been pretty stressful lately. Ghazala is her baby boy, has been mostly sleeping all day and up all night.   Still has mood swings, notes that things might be a little worse relative to before.   Only sleeps for a few hours, and then is really agitated throughout the day. Her kids want her to take them places and she really isn't able to.   She has been trying to access supports, but has had difficulties with this. Feels like she is irritable and pushes people away when she needs their help. This includes her mother, her baby's father, and her friends. The father is not really in the picture   She was seen by Dr. Orozco in the hospital and took Lexapro for 1 day,   Currently breastfeeding part of the time, using formula part of the time.   Did get up to 50mg of Seroquel prior to delivery.   The most problematic aspect of her mood is the irritability. She feels like this leads to her blowing up, and then people call her crazy and don't want to be around her. She does sometimes feel like she wants to put her hand   Hadn't been seeing shadows for a while, but that is starting to come back now. She isn't hearing the voices of her kids anymore, but does hear other things, like deep voices that are muffled, like they're talking loudly in another room. This can be a bit scary. It does make her feel scared that people are going to come into her house, so she has her kids sleep in one room       Discussion points from previous  appointments:   Had been on 50mg of Zoloft previously, but had noted that it caused vivid dreams where she couldn't tell if she was awake or sleeping, had episodes of nocturnal enuresis. Most recently takes it in the morning instead, but doesn't note any benefit. No vivid dreams, but does sleep talk now with it.   Has had significant decreased appetite for a long time, used to have a lot of nausea, but even without nausea, decreased appetite is an issue. Notes that she generally only eats once per day due to this. Denies body image issues.   Once she goes so long without sleeping starts to hear things, feels like she is having conversations with her kids when they aren't there, sees shadows.   Focus / concentration are very difficult, often doesn't remember things, can't manage without a calendar.   Does sometimes have panic when she thinks about issues with her son, notes palpitations, hyperventilation, but notes that this usually only happens in triggered situations, maybe ~10x per month.   One of the most distressing things that she experiences is intrusive dreams where she dreams that she somehow ends up harming her child. These dreams are highly ego dystonic and she feels no compulsion to act on the thoughts while awake, is very bothered by this when it happens.   She is so distressed by all of this that it is hard to go out and be with people anymore. She doesn't do the things that she used to enjoy, doesn't go out with people, and feels this is hard on her kids because they don't really want to be around her either.   She gets tired of being told by providers that things are normal when she feels like they aren't.     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 This has made it hard to stay in jobs. She notes that she gets fired from every job due to anxiety and having to deal with son's behavioral problems. She never got GED and would like to pursue this.   LIVING SITUATION / RELATIONSHIPS- Currently living in apartment with 2 children, son is 9 and daughter is 4. Son has been in therapy for the last 1.5 years. Father of current pregnancy is also father of 3yo, not currently reliably involved.   SOCIAL/ SPIRITUAL SUPPORT- No. Did recently get approved for a .   FEELS SAFE AT HOME- Yes     Medical Review of Systems   Dizziness, fatigue, shortness of breath have been issues even since before pregnancy. Has not had falls but has come close. Dizziness / SOB episodes can last for up to 20 minutes, laying down helps. Also gets sweaty and nauseated during these episodes.   A comprehensive review of systems was performed and is negative other than noted in the HPI.       Psychiatric Medication Trials       Drug /  Start Date Dose (mg) Helpful Adverse Effects DC Reason / Date   Sertraline       Olanzapine 2018 5  sedating sfx   Klonopin   Anger/dysregulation sfx      Medical / Surgical History   CARE TEAM:   PCP- Clinic, [facility]  Therapist- Has been seeing therapists since she was 10. Most recent therapist left, this has happened multiple times and she is tired of the pattern.     Pregnant or breastfeeding:  Currently breastfeeding    Neurologic Hx [head injury, seizures, etc.]: None  Patient Active Problem List   Diagnosis     Dry eyes, bilateral     Hydradenitis     Hyperopia, bilateral     Severe episode of recurrent major depressive disorder, with psychotic features (H)     Need for Tdap vaccination     Supervision of other normal pregnancy, antepartum     Mood disorder of depressed type     Anemia     Encounter for triage in pregnant patient     Normal labor and delivery      (normal spontaneous  "vaginal delivery)      Allergy   Seasonal allergies     Current Medications     Current Outpatient Medications   Medication Sig Dispense Refill     Benzoyl Peroxide (BENZOYL PEROXIDE CLEANSER) 6 % LOTN Externally apply topically daily as needed (acne) 170.3 g 3     clindamycin (CLEOCIN T) 1 % solution Apply topically 2 times daily 60 mL 11     Clindamycin Phos-benzoyl Perox (ACANYA) 1.2-2.5 % GEL Externally apply 0.5 inches topically daily Apply a pea sized amount to each affected area of the face 50 g 11     hydrocortisone (ANUSOL-HC) 2.5 % cream Place rectally 2 times daily as needed for hemorrhoids 30 g 0     Prenatal Vit-Fe Fumarate-FA (PRENATAL MULTIVITAMIN PLUS IRON) 27-0.8 MG TABS per tablet Take 1 tablet by mouth daily 100 tablet 1      Vitals   /70   Pulse 74   Wt 62.1 kg (137 lb)   LMP 03/05/2018 (Exact Date)   BMI 24.27 kg/m      Pulse Readings from Last 5 Encounters:   12/02/18 75   11/26/18 72   11/20/18 92   11/14/18 110   11/06/18 85     Wt Readings from Last 5 Encounters:   11/30/18 72.6 kg (160 lb)   11/26/18 71.8 kg (158 lb 6.4 oz)   11/20/18 70.8 kg (156 lb)   11/14/18 71 kg (156 lb 8 oz)   11/06/18 70.3 kg (155 lb)     BP Readings from Last 5 Encounters:   12/02/18 112/68   11/26/18 117/71   11/20/18 130/69   11/14/18 118/74   11/06/18 111/68      Mental Status Exam   Alertness: alert  and oriented  Appearance: adequately groomed  Behavior/Demeanor: cooperative, pleasant and calm, with good eye contact   Speech: normal and regular rate and rhythm  Language: intact and no problems  Psychomotor: normal or unremarkable  Mood: \"It's been hard\"  Affect: full range and appropriate; was congruent to mood; was congruent to content  Thought Process/Associations: unremarkable  Thought Content:  Reports none;  Denies suicidal ideation, violent ideation and delusions  Perception:  Reports auditory hallucinations and visual hallucinations, as described above  Insight: intact  Judgment: " "intact  Cognition: does appear grossly intact; formal cognitive testing was not done  Gait and Station: unremarkable     Labs and Data     PHQ9 TODAY = 22  PHQ-9 SCORE 9/6/2018 10/11/2018 11/6/2018   PHQ-9 Total Score 22 22 20      Psychiatric Diagnoses   Major depressive disorder, recurrent, severe  Psychosis NOS (r/o MDD w/ psychosis vs primary psychosis)  Anxiety disorder, unspecified     Assessment   Sharmayne Morgan is a 23 year old female who provides a history supporting the diagnoses listed directly above.   Sharmayne notes that she has struggled with anxiety for a long time and has been told that she may be \"on the verge\" of having schizophrenia like her father did. There is notably a strong genetic loading for schizophrenia, with it having been diagnosed in her father and two paternal aunts.   TODAY: Sharmayne reports that things have been very difficult in the last few weeks since her baby was born. She feels that irritability has been the most problematic issue, as she experiences significant mood swings, and then tends to lash out at people that she needs the most support from, including her mother, the father of her baby, and her friends. This has led to increased isolation, which has increased her stress and made things more difficult.   She has been off of medications since a few weeks before delivery, and feels that this correlated with worsening of symptoms. She has had some psychosis symptoms returning as well, in the form of hearing deep voices that sound like they are coming through the walls. This has resulted in her sleeping in a room in the middle of her home with her kids which has been disruptive to the children as well.   We will restart both Seroquel and Zoloft at this time with plan to increase in 1 week if she is tolerating well. I want her to continue increasing the Seroquel by 25mg per week as tolerated up to 100mg as well, as she did feel that this helped with the psychosis symptoms " she was experiencing before.   She notes that therapy has been very helpful for her in the past, but has had a rough run of therapists having to leave for various reasons, so she got tired of searching to find someone that would be more reliable. [facility] will contact her about getting set up with a therapist during pregnancy at this time.   Risk of sarcoidosis: Sharmayne has a strong family history of sarcoidosis, with her father and a cousin having  young from complications related to this condition (pulmonary?). She has always been understandably concerned about this, but does note that she had some kind of test for it when she was younger that was negative. However, given that we don't know what kind of test she had for it, the fact that some tests can be initially negative or falsely negative, the fact that the disease can progress or start later in life, and the family history, I do believe that it would be worth her talking to a PCP and rheumatologist more about this and what kind of monitoring might be appropriate in the future for her. Fortunately, she does not have any overt pulmonary symptoms currently, but it is worth noting that sarcoid is recognized as a rare contributor to psychosis. This is low on the differential, but given the atypical presentation of her psychosis and the relative risk factors for sarcoid in her, I believe that a more thorough evaluation may be warranted after her pregnancy.     PSYCHOTROPIC DRUG INTERACTIONS:   QUETIAPINE-- SERTRALINE may result in increased risk of QT-interval prolongation.     MANAGEMENT:  Monitoring for adverse effects and routine vitals     Plan     1) MEDICATIONS:  - REtart Seroquel 25mg QHS for 1 week. If tolerated, increase by 25mg per week up to a max of 100mg daily  - Restart sertraline 25mg daily, increase to 50mg in 1 week    [see the following SmartPhrase(s) for more information: PSYMEDINFOQUETIAPINE]    2) THERAPY:  Psychotherapy is a primary recommendation. Contact[facility] today for potential therapy options. Patient will be contacted regarding further options.     3) NEXT DUE:   Labs- Routine lab monitoring is not indicated for current psychotropic medication regimen  EKG- N/A   Rating Scales- N/A    4) REFERRALS:   After pregnancy, it is highly recommended that patient establish with a primary care provider for general medical workup, and would consider referral to rheumatology as well for sarcoidosis workup.     5) : None    6) RTC: 8 weeks or sooner if needed. Will call weekly for now to check in.     Treatment Risk Statement:  The patient understands the risks, benefits, adverse effects and alternatives. Agrees to treatment with the capacity to do so. No medical contraindications to treatment. Agrees to call clinic for any problems. The patient understands to call 911 or go to the nearest ED if life threatening or urgent symptoms occur. Crisis numbers are provided routinely in the After Visit Summary.       PROVIDER:  Emanuel Finch MD

## 2019-01-17 ENCOUNTER — TELEPHONE (OUTPATIENT)
Dept: PSYCHIATRY | Facility: CLINIC | Age: 24
End: 2019-01-17

## 2019-01-17 NOTE — TELEPHONE ENCOUNTER
This writer spoke with Sharmayne to set up an initial therapy appointment with [facility], connect briefly with her about her needs, and inspire hope that therapy can be beneficial. Sharmayne scheduled an appointment for Thursday the 24th at 10 a.m.

## 2019-01-23 ENCOUNTER — TELEPHONE (OUTPATIENT)
Dept: PSYCHIATRY | Facility: CLINIC | Age: 24
End: 2019-01-23

## 2019-01-23 NOTE — TELEPHONE ENCOUNTER
Called Sharmayne to check in today:   She notes that she went to therapy and got to the appointment, but then hyperventilated and felt like she couldn't handle things. She scheduled another appointment for tomorrow, but had to cancel due to conflict with her son's therapy.     She requested that the therapist call her as she doesn't know the number to call to reschedule. I told her I would pass this message along.     She notes that she hasn't picked up the new medication, things have just been really hectic. I asked her if there is anything we can do to reduce barriers to her being able to take medications, and she didn't feel like there was, just that she has to remember to take it. I encouraged her to use tricks like putting it next to a toothbrush or her bed, somewhere where she can't avoid the medications before going to bed. She will work on this.     She is getting a new phone in a couple of days, the new number will be [phone].

## 2019-01-28 ENCOUNTER — TELEPHONE (OUTPATIENT)
Dept: PSYCHIATRY | Facility: CLINIC | Age: 24
End: 2019-01-28

## 2019-01-28 DIAGNOSIS — F33.3 SEVERE EPISODE OF RECURRENT MAJOR DEPRESSIVE DISORDER, WITH PSYCHOTIC FEATURES (H): ICD-10-CM

## 2019-01-28 RX ORDER — ESCITALOPRAM OXALATE 5 MG/1
5 TABLET ORAL DAILY
Qty: 30 TABLET | Refills: 1 | Status: SHIPPED | OUTPATIENT
Start: 2019-01-28 | End: 2019-06-24

## 2019-01-28 NOTE — TELEPHONE ENCOUNTER
Called Mikemena to check in. She notes that she did start taking the medications again. She does note that she has been starting to have vivid dreams again as she did previously with Zoloft. The dreams seem very vivid, very real, usually about things like running from someone, or being unable to get to her children. She does have bad dreams at baseline, but it seems to be much more real and vivid than before, more scary. She actually hasn't tried the 25mg of the Seroquel since her baby's dad left 2 weeks after birth. She has been scared to go to the full dose because she doesn't want to be too sedated to wake up for her baby. She notes that she doesn't notice much at all at the 12.5mg dose.     She has not scheduled therapy, states that she is waiting to hear back from the clinic.     Based on what we discussed today, I will consider sertraline to be a failed medication at this point. The vivid dreams are very disruptive to her and have been accompanied by nocturnal enuresis, and this is also consistent with side effects that were noted previously on this medication at higher doses. She did note some benefit on it before, but that was after a longer duration. At this point, I believe the negatives certainly outweigh the positives.     She was agreeable to starting 5mg of Lexapro at this time.     I advised her to increase her Seroquel dose at this time to 25mg. Given that she has tolerated 12.5mg well, I feel it is unlikely she will find 25mg to be overly sedating. I told her that if she does well at 25mg for 2 weeks, she should increase the dose to 50mg.     I will contact [facility] about calling her to schedule therapy again.     Will request that nursing staff follow up with her this Friday about how she is doing and progress with medications, side effects, and scheduling.

## 2019-01-31 ENCOUNTER — CARE COORDINATION (OUTPATIENT)
Dept: PSYCHIATRY | Facility: CLINIC | Age: 24
End: 2019-01-31

## 2019-01-31 NOTE — PROGRESS NOTES
"Called pt to check-in. She reports discontinuing Zoloft as directed by Dr. Finch. She has not yet started the Lexapro 5mg as her car is \"frozen\" and she hasn't been able to make it to the pharmacy. She does have plans to start the medication and will hopefully be able to pick it up this weekend. She is still taking Seroquel 12.5mg and has not yet increased to 25mg. She prefers to wait until this weekend to increase the dose so that she can determine the side effects while she has additional support with her children.     Regarding dreams, she hasn't noticed any change since discontinuing the Zoloft. She states she has only been sleeping around 3 hours per night this week due to caring for her children.     Writer agreed to check-in with pt next week regarding medication progress and side effects. Pt states that her phone number will be changing to: [phone] as of next week. She is giving her old cell phone (#[phone]) to her son. She is still available at this number and will have her new cell phone number listed on the .     Routing update to treatment team.   "

## 2019-01-31 NOTE — PROGRESS NOTES
Message   Received: Today   Message Contents   Emanuel Finch MD Moccio, Emily, RN   Cc: Luz Elena Ronquillo   Caller: Unspecified (Today,  9:54 AM)             Thank you for contacting her! Also that would be great if you could call her next week to check in again. I would just want to make sure that she is on the Lexapro and Seroquel by that point, and increased Seroquel to 50mg if she hasn't already and is tolerating it well. I wouldn't want to increase Lexapro until a little while longer, probably our appointment. I hope she starts taking it, last time it took her a while to pick it up.

## 2019-01-31 NOTE — PROGRESS NOTES
Placed phone call to  [facility] at [phone] and was told that they recommend pt call insurance to ensure that pharmacy would be covered.     Writer placed phone call to Ismael MANRIQUEZ and verified that pt is eligibible to utilize  [facility].

## 2019-02-06 ENCOUNTER — TELEPHONE (OUTPATIENT)
Dept: PSYCHIATRY | Facility: CLINIC | Age: 24
End: 2019-02-06

## 2019-02-06 NOTE — TELEPHONE ENCOUNTER
Called Sharmayne to check in: Left VM telling her that I was just calling to check in on her progress with the medications and that she could call me back if she has any updates or concerns. Also let her know that I wanted to check in on whether she has heard anything about therapy, and if not to give me a call so I could help to facilitate things.

## 2019-02-12 ENCOUNTER — TELEPHONE (OUTPATIENT)
Dept: PSYCHIATRY | Facility: CLINIC | Age: 24
End: 2019-02-12

## 2019-02-13 NOTE — TELEPHONE ENCOUNTER
This writer left a message with Sharmayne regarding rescheduling a therapy session. This writer left several options in a voice mail and her direct contact information and asked Sharmayne to call back to schedule.

## 2019-02-18 ENCOUNTER — TELEPHONE (OUTPATIENT)
Dept: PSYCHIATRY | Facility: CLINIC | Age: 24
End: 2019-02-18

## 2019-02-18 NOTE — TELEPHONE ENCOUNTER
Called Sharmayne to follow up after she missed her appointment today:   No answer. Left voicemail letting her know that I continue to be concerned about her and wanted to check in with her about how she is doing with the medications. I also strongly encouraged her to return the call to the [facility] therapist Luz Elena Ronquillo who contacted her recently. Requested that she call us back to update us and offered that we could help her with any issues including contacting providers if needed, and let her know that we are thinking of her and will continue to do whatever we can to help her be well.

## 2019-02-20 ENCOUNTER — TELEPHONE (OUTPATIENT)
Dept: PSYCHIATRY | Facility: CLINIC | Age: 24
End: 2019-02-20

## 2019-02-20 NOTE — TELEPHONE ENCOUNTER
Social Work  Outgoing Voicemail Message  [facility]      Left a detailed voicemail message for Sharmayne Morgan. SW following up with patient on request of Dr. Finch.    SW inquired about patient's well-being and needs. SW strongly encouraged Sharmayne to follow up with writer on any needs.      Writer requested call back. Included writer's direct contact information and availability.     Plan: SW will follow up in 1 week. Patient's voicemail was not full, which may indicate that she is active on her phone.        Yamila Martinez, JAVAN, LICSW

## 2019-02-28 ENCOUNTER — TELEPHONE (OUTPATIENT)
Dept: PSYCHIATRY | Facility: CLINIC | Age: 24
End: 2019-02-28

## 2019-02-28 NOTE — TELEPHONE ENCOUNTER
Social Work  Outgoing Voicemail Message  [facility]      Left a detailed voicemail message for Maribel Gomes, patient's mother and emergency contact.    Writer identified self as  from [facility]. SW shared we have not had contact with patient for awhile and were concerned for well-being. SW requested follow up from patient or her mother. Provided direct contact information.    Patient's mother was contacted as clinic has not received communication with patient since the birth of her child in late November/early December. Patient has risk factors of history of major depressive disorder and precarious living situation. Multiple providers have reached out to patient in the last few weeks with no response. In reviewing records, she also has not had contact with OB/Gyn clinic.    Plan: SW will alert providers of attempt to contact. Team will consult as to next steps if no contact.      ADDENDUM: Maribel returned call. She reported that Sharmayne is moving and trying to figure out where to move her belongings. Maribel noted that she believes Sharmayne is doing okay. She will let Sharmayne know we are concerned about her and ask Sharmayne to contact clinic.        Yamila Martinez, MSW, LICSW

## 2019-03-08 ENCOUNTER — TELEPHONE (OUTPATIENT)
Dept: PSYCHIATRY | Facility: CLINIC | Age: 24
End: 2019-03-08

## 2019-03-08 NOTE — TELEPHONE ENCOUNTER
Called Sharmayne to follow up and check in. No answer. Left voicemail requesting that she call me back at the clinic, left clinic number for her.

## 2019-05-09 ENCOUNTER — TELEPHONE (OUTPATIENT)
Dept: PSYCHIATRY | Facility: CLINIC | Age: 24
End: 2019-05-09

## 2019-05-10 NOTE — TELEPHONE ENCOUNTER
Thanks for reaching out to her again Yanique. I have also tried repeatedly to contact her and have received no response back. I am considering her signed off from our clinic at this point unfortunately.

## 2019-06-24 ENCOUNTER — PRENATAL OFFICE VISIT (OUTPATIENT)
Dept: NURSING | Facility: CLINIC | Age: 24
End: 2019-06-24
Payer: MEDICAID

## 2019-06-24 VITALS
HEIGHT: 63 IN | BODY MASS INDEX: 21.16 KG/M2 | WEIGHT: 119.4 LBS | TEMPERATURE: 98 F | SYSTOLIC BLOOD PRESSURE: 106 MMHG | DIASTOLIC BLOOD PRESSURE: 70 MMHG | HEART RATE: 97 BPM

## 2019-06-24 DIAGNOSIS — Z34.90 SUPERVISION OF NORMAL PREGNANCY: Primary | ICD-10-CM

## 2019-06-24 DIAGNOSIS — Z23 NEED FOR TDAP VACCINATION: ICD-10-CM

## 2019-06-24 PROBLEM — N89.8 VAGINAL DISCHARGE: Status: ACTIVE | Noted: 2019-03-26

## 2019-06-24 LAB
ABO + RH BLD: NORMAL
ABO + RH BLD: NORMAL
ALBUMIN UR-MCNC: ABNORMAL MG/DL
APPEARANCE UR: CLEAR
BILIRUB UR QL STRIP: NEGATIVE
BLD GP AB SCN SERPL QL: NORMAL
BLOOD BANK CMNT PATIENT-IMP: NORMAL
COLOR UR AUTO: YELLOW
ERYTHROCYTE [DISTWIDTH] IN BLOOD BY AUTOMATED COUNT: 13.3 % (ref 10–15)
GLUCOSE UR STRIP-MCNC: NEGATIVE MG/DL
HBV SURFACE AG SERPL QL IA: NONREACTIVE
HCG UR QL: POSITIVE
HCT VFR BLD AUTO: 32.6 % (ref 35–47)
HGB BLD-MCNC: 11.1 G/DL (ref 11.7–15.7)
HGB UR QL STRIP: NEGATIVE
HIV 1+2 AB+HIV1 P24 AG SERPL QL IA: NONREACTIVE
KETONES UR STRIP-MCNC: 15 MG/DL
LEUKOCYTE ESTERASE UR QL STRIP: NEGATIVE
MCH RBC QN AUTO: 30.2 PG (ref 26.5–33)
MCHC RBC AUTO-ENTMCNC: 34 G/DL (ref 31.5–36.5)
MCV RBC AUTO: 89 FL (ref 78–100)
NITRATE UR QL: NEGATIVE
PH UR STRIP: 6 PH (ref 5–7)
PLATELET # BLD AUTO: 343 10E9/L (ref 150–450)
RBC # BLD AUTO: 3.68 10E12/L (ref 3.8–5.2)
SOURCE: ABNORMAL
SP GR UR STRIP: >1.03 (ref 1–1.03)
SPECIMEN EXP DATE BLD: NORMAL
UROBILINOGEN UR STRIP-ACNC: 0.2 EU/DL (ref 0.2–1)
WBC # BLD AUTO: 8.4 10E9/L (ref 4–11)

## 2019-06-24 PROCEDURE — 87086 URINE CULTURE/COLONY COUNT: CPT | Performed by: ADVANCED PRACTICE MIDWIFE

## 2019-06-24 PROCEDURE — 86850 RBC ANTIBODY SCREEN: CPT | Performed by: ADVANCED PRACTICE MIDWIFE

## 2019-06-24 PROCEDURE — 81025 URINE PREGNANCY TEST: CPT | Performed by: ADVANCED PRACTICE MIDWIFE

## 2019-06-24 PROCEDURE — 86901 BLOOD TYPING SEROLOGIC RH(D): CPT | Performed by: ADVANCED PRACTICE MIDWIFE

## 2019-06-24 PROCEDURE — 36415 COLL VENOUS BLD VENIPUNCTURE: CPT | Performed by: ADVANCED PRACTICE MIDWIFE

## 2019-06-24 PROCEDURE — 86762 RUBELLA ANTIBODY: CPT | Performed by: ADVANCED PRACTICE MIDWIFE

## 2019-06-24 PROCEDURE — 81003 URINALYSIS AUTO W/O SCOPE: CPT | Performed by: ADVANCED PRACTICE MIDWIFE

## 2019-06-24 PROCEDURE — 99207 ZZC NO CHARGE NURSE ONLY: CPT

## 2019-06-24 PROCEDURE — 87389 HIV-1 AG W/HIV-1&-2 AB AG IA: CPT | Performed by: ADVANCED PRACTICE MIDWIFE

## 2019-06-24 PROCEDURE — 87340 HEPATITIS B SURFACE AG IA: CPT | Performed by: ADVANCED PRACTICE MIDWIFE

## 2019-06-24 PROCEDURE — 85027 COMPLETE CBC AUTOMATED: CPT | Performed by: ADVANCED PRACTICE MIDWIFE

## 2019-06-24 PROCEDURE — 86900 BLOOD TYPING SEROLOGIC ABO: CPT | Performed by: ADVANCED PRACTICE MIDWIFE

## 2019-06-24 PROCEDURE — 86780 TREPONEMA PALLIDUM: CPT | Performed by: ADVANCED PRACTICE MIDWIFE

## 2019-06-24 SDOH — HEALTH STABILITY: MENTAL HEALTH
STRESS IS WHEN SOMEONE FEELS TENSE, NERVOUS, ANXIOUS, OR CAN'T SLEEP AT NIGHT BECAUSE THEIR MIND IS TROUBLED. HOW STRESSED ARE YOU?: NOT AT ALL

## 2019-06-24 ASSESSMENT — MIFFLIN-ST. JEOR: SCORE: 1255.72
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
## 2019-06-25 LAB
BACTERIA SPEC CULT: NORMAL
BACTERIA SPEC CULT: NORMAL
RUBV IGG SERPL IA-ACNC: 12 IU/ML
SPECIMEN SOURCE: NORMAL
T PALLIDUM AB SER QL: NONREACTIVE

## 2019-06-26 PROBLEM — D64.9 ANEMIA: Status: ACTIVE | Noted: 2018-08-13

## 2019-07-01 ENCOUNTER — PRENATAL OFFICE VISIT (OUTPATIENT)
Dept: MIDWIFE SERVICES | Facility: CLINIC | Age: 24
End: 2019-07-01

## 2019-07-01 VITALS
BODY MASS INDEX: 21.26 KG/M2 | TEMPERATURE: 97.5 F | HEART RATE: 100 BPM | DIASTOLIC BLOOD PRESSURE: 64 MMHG | SYSTOLIC BLOOD PRESSURE: 109 MMHG | WEIGHT: 120 LBS

## 2019-07-01 DIAGNOSIS — Z34.91 PRENATAL CARE IN FIRST TRIMESTER: Primary | ICD-10-CM

## 2019-07-01 PROBLEM — N89.8 VAGINAL DISCHARGE: Status: RESOLVED | Noted: 2019-03-26 | Resolved: 2019-07-01

## 2019-07-01 NOTE — NURSING NOTE
"Chief Complaint   Patient presents with     Prenatal Care       Initial /64 (BP Location: Left arm, Patient Position: Sitting, Cuff Size: Adult Regular)   Pulse 100   Temp 97.5  F (36.4  C) (Oral)   Wt 54.4 kg (120 lb)   LMP 2019   BMI 21.26 kg/m   Estimated body mass index is 21.26 kg/m  as calculated from the following:    Height as of 19: 1.6 m (5' 3\").    Weight as of this encounter: 54.4 kg (120 lb).  BP completed using cuff size: regular    Questioned patient about current smoking habits.  Pt. quit smoking some time ago.          The following HM Due: NONE      The following patient reported/Care Every where data was sent to:  P ABSTRACT QUALITY INITIATIVES [24584]  SADAF Hayes           "

## 2019-07-01 NOTE — LETTER
[address] [address]  [phone]      July 1, 2019      Sharmayne Morgan  [address]   [facility] [address]              To Whom It May Concern:    Sharmayne Morgan is being seen in our clinic for prenatal care.  Her Estimated Date of Delivery: Jan 26, 2020.  Patient's last menstrual period was 04/21/2019.      Sincerely,          Caridad Che CNM

## 2019-07-01 NOTE — PROGRESS NOTES
is a  partnered   8 para 3 0 4 3   Edna's rule of 20 who presents for a new OB Visit. She has not had bleeding since her LMP.  She has had mild nausea.. Weigh loss   has not occurred.. She denies fever, chills and viral infections since her last LMP. There is mildfatigue.  This was not a planned pregnancy.  She is a previous CNM patient.  =========================================    INFECTION HISTORY  HIV: no  Hepatitis B: no  Hepatitis C: no  Tuberculosis: no    Herpes self: no  Herpes partner:  no  Chlamydia:  no  Gonorrhea:  no  HPV: no  BV:  no  Trichomonis:  no  Syphilis:  no  Chicken Pox:  no  ============================================    PERSONAL/SOCIAL HISTORY  Lives lives with their family.  =============================================    REVIEW OF SYSTEMS  CONSTITUTIONAL: Denies fever, chills and night sweats  DIET: Appetite is continue.  Eats a regular.    Plans to gain 25-35 pounds with her pregnancy.  SKIN: Denies changes and suspicious moles or lesions.  ENT: Denies blurred vision, hearing loss, tinnitus, frequent colds, epistaxis, hoarseness and tooth pain.    ENDOCRINE: Denies heat and cold intolerance, and polydypsia.    BREASTS:  Tender since LMP.  Denies discharge and lumps.   HEART/LUNGS: Denies dyspnea, wheezing, coughing and chest pain.  HEMATOLOGIC: Denies tendency to bruise and history of blood clots.  GASTROINTESTINAL: Denies heartburn, indigestion and change of color in stools.    GENITOURINARY:  Denies urgency, dysuria and hematuria.  Has frequency of urination since LMP.   NEUROLOGIC:  Denies seizures, weakness and fainting.  PSYCHIATRIC:  Denies mood changes.  Says that she feels stable with her mental health.  Not taking any medication.    ===========================================    PHYSICAL EXAM  GENERAL:   pleasant pregnant female, alert, cooperative and well groomed.  SKIN:  Warm and dry, without lesions or tenderness.    HEAD: Symmetrical  features.  EYES:  PERRLA, wears no corective lenses.  EARS: Tympanic membranes gray, translucent and intact.  NOSE: No flaring, patent  MOUTH:  Buccal mucosa pink, moist without lesions.  Teeth in good repair.    NECK:  Thyroid without enlargement and nodules.  Lymph nodes not palpable.   LUNGS:  Clear to auscultation.  BREAST:  Symmetrical without lesions or nodes.  Nipples everted.  Areolas symmetric.  No palpable axillary nodes.  HEART:  RRR without murmur.  ABDOMEN: Soft without masses or tenderness.  No CVA tenderness.  Uterus palpable at size equal to dates.   MUSCULOSKELETAL:  Full range of motion  GENITALIA:  Pap up to date.  Pap offered and declined.    EXTREMITIES:  2+/2+ DTR, No edema. No significant varicosities.  ===================================================    PLAN:  Instructed on use of triage nurse line and contacting the on call CNM after hours in an emergency.    Discussed the indications, uses for and false positives for quad screen and fetal survey ultrasounds at 18-20 weeks gestation.  Will inform us at the next visit if she wished to avail herself of these screens.  First trimester screen ordered per her request.    Will return to the clinic in 4 weeks for her next routine prenatal check.  Will call to be seen sooner if problem arise.  Oriented to CNM service and added to list.    Denies any concerns about her mental health at this time.  Denies any need for assistance.     Caridad Che, DNP, APRN, CNM

## 2019-07-09 ENCOUNTER — PRE VISIT (OUTPATIENT)
Dept: MATERNAL FETAL MEDICINE | Facility: CLINIC | Age: 24
End: 2019-07-09

## 2019-07-18 ENCOUNTER — HOSPITAL ENCOUNTER (OUTPATIENT)
Dept: LAB | Facility: CLINIC | Age: 24
End: 2019-07-18
Attending: ADVANCED PRACTICE MIDWIFE

## 2019-07-18 ENCOUNTER — HOSPITAL ENCOUNTER (OUTPATIENT)
Dept: ULTRASOUND IMAGING | Facility: CLINIC | Age: 24
Discharge: HOME | End: 2019-07-18
Attending: ADVANCED PRACTICE MIDWIFE | Admitting: ADVANCED PRACTICE MIDWIFE

## 2019-07-18 ENCOUNTER — OFFICE VISIT (OUTPATIENT)
Dept: MATERNAL FETAL MEDICINE | Facility: CLINIC | Age: 24
End: 2019-07-18
Attending: ADVANCED PRACTICE MIDWIFE

## 2019-07-18 ENCOUNTER — OFFICE VISIT (OUTPATIENT)
Dept: MATERNAL FETAL MEDICINE | Facility: CLINIC | Age: 24
End: 2019-07-18
Attending: ADVANCED PRACTICE MIDWIFE
Payer: MEDICAID

## 2019-07-18 DIAGNOSIS — O26.90 PREGNANCY RELATED CONDITION, ANTEPARTUM: ICD-10-CM

## 2019-07-18 DIAGNOSIS — Z36.9 FIRST TRIMESTER SCREENING: ICD-10-CM

## 2019-07-18 DIAGNOSIS — Z36.9 FIRST TRIMESTER SCREENING: Primary | ICD-10-CM

## 2019-07-18 DIAGNOSIS — Z36.82 ENCOUNTER FOR (NT) NUCHAL TRANSLUCENCY SCAN: Primary | ICD-10-CM

## 2019-07-18 PROCEDURE — 36415 COLL VENOUS BLD VENIPUNCTURE: CPT | Performed by: OBSTETRICS & GYNECOLOGY

## 2019-07-18 PROCEDURE — 96040 ZZH GENETIC COUNSELING, EACH 30 MINUTES: CPT | Mod: ZF | Performed by: GENETIC COUNSELOR, MS

## 2019-07-18 PROCEDURE — 82105 ALPHA-FETOPROTEIN SERUM: CPT | Performed by: OBSTETRICS & GYNECOLOGY

## 2019-07-18 PROCEDURE — 84704 HCG FREE BETACHAIN TEST: CPT | Performed by: OBSTETRICS & GYNECOLOGY

## 2019-07-18 PROCEDURE — 76813 OB US NUCHAL MEAS 1 GEST: CPT

## 2019-07-18 PROCEDURE — 84163 PAPPA SERUM: CPT | Performed by: OBSTETRICS & GYNECOLOGY

## 2019-07-18 NOTE — PROGRESS NOTES
"[facility]  Genetic Counseling Consult    Patient: Sharmayne Morgan YOB: 1995   Date of Service: 19      Sharmayne Morgan was seen at [facility] for genetic consultation to discuss the options for routine screening for fetal chromosome abnormalities. She was accompanied to today's visit by her partner, Boris.      Impression/Plan:   1.  Sharmayne had an ultrasound and blood draw for first trimester screening.  Results are expected within 5-7 days, and will be available in [facility].  We will contact her to discuss the results, and a copy will be forwarded to the office of the referring OB provider.    2. Maternal serum AFP (single marker screen) is recommended after 15 weeks to screen for open neural tube defects. A quad screen should not be performed.    Pregnancy History:   /Parity:    Age at Delivery: 24 year old  MATT: 2020, by Last Menstrual Period  Gestational Age: 12w4d    No significant complications or exposures were reported in the current pregnancy.    This pregnancy is fathered by a different partner than her previous pregnancies.    Sharmayne s pregnancy history is significant for:  o SAB x3  o 2009: 41+0, vaginal delivery, male  o 2014: 40+0, vaginal delivery, female  o 2017: TAB  o 2018: 39+4, vaginal delivery, male    Medical History:   Sharmayne s reported medical history is not expected to impact pregnancy management or risks to fetal development.       Family History:   A three-generation pedigree was obtained in a previous pregnancy, was reviewed today, and is scanned under the  Media  tab.   The following significant findings were reviewed with Sharmayne at previous genetic counseling visits.    Sharmayne's nephew  of complications of a congenital heart defect. This nephew reportedly had \"3 holes in his heart\" and there were attempts made to surgically correct this issue. " " She reports that he had oxygen at home, and she is wondering if he didn't get enough oxygen one night, as she reports his death \"was like SIDS.\"  Sharmayne was not aware of any other physical birth defects, significant medical issues, or developmental concerns in this individual; she was not aware of any other family history of known congenital heart defects.  (She does report that another sister and her father have a reported history of  \"heart murmur\"; she does not know many details of this, but she was not aware of any medical intervention being needed for this history.)    Sharmayne reports a possible family history of sickle cell disease in a maternal aunt.     Sharmayne's mother had a son that was stillborn.    Sharmayne's paternal first cousin was diagnosed with colon cancer in his 20s.    Otherwise, the reported family history is negative for multiple miscarriages, stillbirths, birth defects, mental retardation, known genetic conditions, and consanguinity.       Carrier Screening:   The patient reports that the father of the pregnancy has  ancestry:      Cystic fibrosis is an autosomal recessive genetic condition that occurs with increased frequency in individuals of  ancestry and carrier screening for this condition is available.  In addition,  screening in the [facility] includes cystic fibrosis.    The patient reports that she is of  ancestry:      We reviewed the clinical features, autosomal recessive inheritance, and options for carrier screening and  screening for hemoglobinopathies.      Expanded carrier screening for mutations in a large panel of genes associated with autosomal recessive conditions including cystic fibrosis, spinal muscular atrophy, and others, is now available.      Sharmayne has had a normal hemoglobin electrophoresis.        Risk Assessment for Chromosome Conditions:   We explained that the risk for fetal chromosome " abnormalities increases with maternal age. We discussed specific features of common chromosome abnormalities, including Down syndrome, trisomy 13, trisomy 18, and sex chromosome trisomies.      - At age 24 at midtrimester, the risk to have a baby with Down syndrome is 1 in 990.    - At age 24 at midtrimester, the risk to have a baby with any chromosome abnormality is 1 in 495.        Testing Options:   We discussed the following options:   First trimester screening    First trimester ultrasound with nuchal translucency and nasal bone assessments, maternal plasma hCG, JAVAN-A, and AFP measurement    Screens for fetal trisomy 21, trisomy 13, and trisomy 18    Cannot screen for open neural tube defects; maternal serum AFP after 15 weeks is recommended      We reviewed the benefits and limitations of this testing.  Screening tests provide a risk assessment specific to the pregnancy for certain fetal chromosome abnormalities, but cannot definitively diagnose or exclude a fetal chromosome abnormality.  Follow-up genetic counseling and consideration of diagnostic testing is recommended with any abnormal screening result.      It was a pleasure to be involved with [facility] care. Face-to-face time of the meeting was 20 minutes.    Michelle Almaguer MS, [facility]  Maternal Fetal Medicine  [facility]  Ph: [phone]  [email or web address]

## 2019-07-18 NOTE — PROGRESS NOTES
Please refer to ultrasound report under 'Imaging' Studies of 'Chart Review' tabs.    Rafi Alexis M.D.

## 2019-07-22 ENCOUNTER — TELEPHONE (OUTPATIENT)
Dept: MATERNAL FETAL MEDICINE | Facility: CLINIC | Age: 24
End: 2019-07-22

## 2019-07-22 LAB — LAB SCANNED RESULT: NORMAL

## 2019-07-22 NOTE — TELEPHONE ENCOUNTER
I spoke with Sharmayne today regarding her normal first trimester screen results. Specifically, the chance this pregnancy has Down syndrome was reduced from her age related risk of 1 in 943 to less than 1 in 10,000. Similarly, the chance this pregnancy has trisomy 18/trisomy 13 was reduced from her age related risk of 1 in 1,796 to less than 1 in 10,000. This result significantly reduces the chance this pregnancy has Down syndrome, trisomy 18, or trisomy 13.     We discussed that these results are very reassuring, but there remains a small chance for a false positive or false negative result. This screen also does not address all chromosome abnormalities or all causes of birth defects and cognitive delay. As such, Sharmayne will still have the option of the Innatal screen and/or diagnostic testing (CVS or amniocentesis) if she is interested or there is an indication later in the pregnancy. Sharmayne declines additional testing or screening at this time. She also has the option of having a maternal serum AFP blood draw after 15 weeks to screen for ONTD; she is encouraged to discuss this option with her primary OB provider. Sharmayne is also scheduled to return for a comprehensive ultrasound on 9/5/2019.     All of Sharmayne's questions were answered to her satisfaction and I encouraged her to contact me if she has any questions in the future. A copy of this note and her first trimester screen results will be forwarded to her primary OB provider.    Michelle Almaguer MS, [facility]  Maternal Fetal Medicine  [facility]  Ph: [phone]  [email or web address]

## 2019-08-23 ENCOUNTER — TELEPHONE (OUTPATIENT)
Dept: MIDWIFE SERVICES | Facility: CLINIC | Age: 24
End: 2019-08-23

## 2019-08-23 ENCOUNTER — HOSPITAL ENCOUNTER (EMERGENCY)
Facility: CLINIC | Age: 24
Discharge: LEFT BEFORE DISCHARGE | End: 2019-08-23
Attending: EMERGENCY MEDICINE | Admitting: EMERGENCY MEDICINE
Payer: MEDICAID

## 2019-08-23 VITALS
HEART RATE: 82 BPM | DIASTOLIC BLOOD PRESSURE: 72 MMHG | SYSTOLIC BLOOD PRESSURE: 101 MMHG | RESPIRATION RATE: 20 BRPM | TEMPERATURE: 97.5 F | OXYGEN SATURATION: 99 %

## 2019-08-23 DIAGNOSIS — R11.2 NON-INTRACTABLE VOMITING WITH NAUSEA, UNSPECIFIED VOMITING TYPE: ICD-10-CM

## 2019-08-23 PROCEDURE — 99283 EMERGENCY DEPT VISIT LOW MDM: CPT | Mod: Z6 | Performed by: EMERGENCY MEDICINE

## 2019-08-23 PROCEDURE — 99282 EMERGENCY DEPT VISIT SF MDM: CPT | Performed by: EMERGENCY MEDICINE

## 2019-08-23 ASSESSMENT — ENCOUNTER SYMPTOMS
NAUSEA: 1
NECK STIFFNESS: 0
FEVER: 0
UNEXPECTED WEIGHT CHANGE: 1
DIZZINESS: 1
COLOR CHANGE: 0
ARTHRALGIAS: 0
LIGHT-HEADEDNESS: 1
APPETITE CHANGE: 1
SHORTNESS OF BREATH: 1
VOMITING: 1
CONFUSION: 0
HEADACHES: 0
DIFFICULTY URINATING: 0
EYE REDNESS: 0
ABDOMINAL PAIN: 0

## 2019-08-23 NOTE — TELEPHONE ENCOUNTER
"Patient calling from the  ER. Patient is upset because she does not feel like the staff is listening to her - \"they just keep asking me the same questions\". She is upset that she has been waiting the room for so long with no help. Informed her that there is nothing that I can do from the clinic - she should hit her call button and speak with the ER care staff about her concerns. Patient stated that she did not understand why she was told to go to  ER instead of the one closer to her house. Informed patient that we recommending staying without system for continuity of care. Informed patient that if she is unhappy with her care in the ER right now, she could also leave and go elsewhere or speak to the staff there about her concerns. Patient stated that she was going to transfer all of care elsewhere because \"you guys don't know anything\" and then hung up the phone.  Liz New RN    "

## 2019-08-23 NOTE — ED PROVIDER NOTES
"    [facility] EMERGENCY DEPARTMENT ([facility])    19        History     Chief Complaint   Patient presents with     Shortness of Breath     Chest Pain     The history is provided by the patient and medical records.     Sharmayne Morgan is a 24 year old  female who is 17w5d pregnant with a past medical history significant for MDD and anxiety who presents here to the Emergency Department due to shortness of breath, vomiting and lack of appetite. Patient states that she has not eaten in 5 days. States that she is not eating because \"Just cant eat\". She states that she cant swallow the food and that it wont stay down. Notes that she is not eating to the point where she is vomiting. She states that she is vomiting the \"yellow stuff at the bottom of your stomach\". She states that she has been vomiting all day and that there was too many to count yesterday. Notes not vomiting today. She is also noting that she is having shortness of breath, seeing spots and that everything is blurry. Notes that she has always had chest pain.    Per chart review patient had called the midwives today for advice. She noted not eating for the past 5 days and feels as if she had lost weight. Stated that she felt dizzy and lightheaded. She had not had a prenatal visit since early July. She was advised to come here to the ED today for evaluation/treatment and then visit the clinic next week to which she agreed.    I have reviewed the Medications, Allergies, Past Medical and Surgical History, and Social History in the [employer] system.    Past Medical History:   Diagnosis Date     Anxiety      Manic depressive disorder (H)      Severe recurrent majr depression w/psychotc features, mood-incongruent (H) 2017    Hallucinations - visual and auditory     Trauma in childhood        Past Surgical History:   Procedure Laterality Date     NO HISTORY OF SURGERY         Family History   Problem Relation Age of Onset     Substance Abuse " Mother      Sarcoidosis Father      Heart Murmur Father      Schizophrenia Father      Stillborn Brother      Attention Deficit Disorder Son        Social History     Tobacco Use     Smoking status: Former Smoker     Smokeless tobacco: Never Used   Substance Use Topics     Alcohol use: No       No current facility-administered medications for this encounter.      Current Outpatient Medications   Medication     Prenatal Vit-Fe Fumarate-FA (PRENATAL MULTIVITAMIN PLUS IRON) 27-0.8 MG TABS per tablet     Benzoyl Peroxide (BENZOYL PEROXIDE CLEANSER) 6 % LOTN     QUEtiapine (SEROQUEL) 25 MG tablet        Allergies   Allergen Reactions     Sertraline Other (See Comments)     Vivid dreams and nocturnal enuresis     Seasonal Allergies          Review of Systems   Constitutional: Positive for appetite change (Decreased) and unexpected weight change (Decreased). Negative for fever.   HENT: Negative for congestion.    Eyes: Positive for visual disturbance. Negative for redness.   Respiratory: Positive for shortness of breath.    Cardiovascular: Positive for chest pain (Ongoing).   Gastrointestinal: Positive for nausea and vomiting. Negative for abdominal pain.   Genitourinary: Negative for difficulty urinating.   Musculoskeletal: Negative for arthralgias and neck stiffness.   Skin: Negative for color change.   Neurological: Positive for dizziness and light-headedness. Negative for headaches.   Psychiatric/Behavioral: Negative for confusion.   All other systems reviewed and are negative.      Physical Exam   BP: 101/72  Pulse: 82  Temp: 97.5  F (36.4  C)  Resp: 20  SpO2: 99 %  Patient appeared in no acute distress.  Head appeared atraumatic.  Pupils appeared round reactive to light.  Neck was supple.        ED Course   10:35 AM  The patient was seen and examined by Malachi Villarreal MD in Room ED06.        Procedures             Labs Ordered and Resulted from Time of ED Arrival Up to the Time of Departure from the ED - No data to  display         Assessments & Plan (with Medical Decision Making)   24-year-old female who is approximately 17 weeks by dates presented with a number of complaints including vomiting, blurred vision, chest pain, shortness of breath.  Shortly after arriving in during initial interview, patient became increasingly irritable to the point where she stated that she was going to leave.  Patient was signed out AGAINST MEDICAL ADVICE.    I have reviewed the nursing notes.    I have reviewed the findings, diagnosis, plan and need for follow up with the patient.    Discharge Medication List as of 8/23/2019 11:27 AM          Final diagnoses:   Non-intractable vomiting with nausea, unspecified vomiting type     I, Raudel Hdz, am serving as a trained medical scribe to document services personally performed by Malachi Villarreal MD, based on the provider's statements to me.   I, Malachi Villarreal MD, was physically present and have reviewed and verified the accuracy of this note documented by Raudel Hdz.    8/23/2019   [facility] [place], EMERGENCY DEPARTMENT     Allan Villarreal MD  08/23/19 [id number]

## 2019-08-23 NOTE — ED TRIAGE NOTES
Pt is 17 wks preg and originally didn't want to state why she is here but then in further talk to her she states she her because she is shortness of breath and the nurse mid wife is coming to see her.  Then in further talking to the Pt the Pt states they the mid wives wanted the Pt to come in and be evaluated due to her not getting follow up and find out if the shortness of breath is related to anxiety.  See chart for notes.  Pt states having chest pressure, and shortness of breath for a while now.

## 2019-08-23 NOTE — TELEPHONE ENCOUNTER
Pt is calling for advice.  She said that she has not eaten anything for the last five days.  Feels like she's lost a lot of weight.  Feels dizzy/lightheaded.  No prenatal visit since early July.  Advised ED today for eval and treatment and then clinic visit next week.  Pt verbalized understanding.  Kailey Wynne RN

## 2019-08-23 NOTE — ED NOTES
"Went in patient's room to draw labs. Patient refused to have them drawn, said she \"doesn't feel like doing that today\" and also when asked if she could provide a urine sample she said \"I don't have to go\". Patient states that she is leaving and going to \"[facility] instead\". Patient refused to sign AMA form.  "

## 2019-08-29 ENCOUNTER — OFFICE VISIT (OUTPATIENT)
Dept: MATERNAL FETAL MEDICINE | Facility: CLINIC | Age: 24
End: 2019-08-29
Attending: OBSTETRICS & GYNECOLOGY
Payer: MEDICAID

## 2019-08-29 ENCOUNTER — HOSPITAL ENCOUNTER (OUTPATIENT)
Dept: ULTRASOUND IMAGING | Facility: CLINIC | Age: 24
Discharge: HOME | End: 2019-08-29
Attending: OBSTETRICS & GYNECOLOGY | Admitting: OBSTETRICS & GYNECOLOGY
Payer: MEDICAID

## 2019-08-29 DIAGNOSIS — O44.00 COMPLETE PLACENTA PREVIA NOS OR WITHOUT HEMORRHAGE, UNSPECIFIED TRIMESTER: Primary | ICD-10-CM

## 2019-08-29 PROCEDURE — 76811 OB US DETAILED SNGL FETUS: CPT

## 2019-08-29 NOTE — PROGRESS NOTES
"Please see \"Imaging\" tab under \"Chart Review\" for details of today's visit.    Kasia Hackett    "

## 2019-09-04 ENCOUNTER — PRENATAL OFFICE VISIT (OUTPATIENT)
Dept: MIDWIFE SERVICES | Facility: CLINIC | Age: 24
End: 2019-09-04
Payer: MEDICAID

## 2019-09-04 VITALS
BODY MASS INDEX: 20.55 KG/M2 | SYSTOLIC BLOOD PRESSURE: 102 MMHG | WEIGHT: 116 LBS | OXYGEN SATURATION: 100 % | HEART RATE: 82 BPM | DIASTOLIC BLOOD PRESSURE: 68 MMHG

## 2019-09-04 DIAGNOSIS — Z34.80 SUPERVISION OF OTHER NORMAL PREGNANCY, ANTEPARTUM: Primary | ICD-10-CM

## 2019-09-04 PROCEDURE — 99207 ZZC PRENATAL VISIT: CPT | Performed by: ADVANCED PRACTICE MIDWIFE

## 2019-09-04 NOTE — LETTER
[address] [address]  [phone]        September 4, 2019      Sharmayne Morgan would like take a leave of absence due to a medical condition as of today.      Sincerely,        Caridad Che, DNP, APRN, CNM

## 2019-09-04 NOTE — PROGRESS NOTES
Feeling well.  Baby is active. Denies any leaking of fluid, vaginal bleeding, regular uterine contractions, or headaches or other concerns.  Requests a note to stop working.  She says that it is too stressful and that is why she is losing weight.  She has a follow up US due to complete previa.  Discussed GCT at next visit.    Reviewed to call [phone] for contractions, loss of fluid, vaginal bleeding, decreased fetal movement or any other questions or concerns.    RTC in 5 weeks.  Caridad Che, SHEELA, APRN, CNM

## 2019-09-26 ENCOUNTER — MEDICAL CORRESPONDENCE (OUTPATIENT)
Dept: HEALTH INFORMATION MANAGEMENT | Facility: CLINIC | Age: 24
End: 2019-09-26

## 2019-09-26 ENCOUNTER — OFFICE VISIT (OUTPATIENT)
Dept: MATERNAL FETAL MEDICINE | Facility: CLINIC | Age: 24
End: 2019-09-26
Attending: OBSTETRICS & GYNECOLOGY
Payer: COMMERCIAL

## 2019-09-26 ENCOUNTER — HOSPITAL ENCOUNTER (OUTPATIENT)
Dept: ULTRASOUND IMAGING | Facility: CLINIC | Age: 24
Discharge: HOME | End: 2019-09-26
Attending: OBSTETRICS & GYNECOLOGY | Admitting: OBSTETRICS & GYNECOLOGY
Payer: COMMERCIAL

## 2019-09-26 ENCOUNTER — TELEPHONE (OUTPATIENT)
Dept: MIDWIFE SERVICES | Facility: CLINIC | Age: 24
End: 2019-09-26

## 2019-09-26 DIAGNOSIS — O44.00 COMPLETE PLACENTA PREVIA NOS OR WITHOUT HEMORRHAGE, UNSPECIFIED TRIMESTER: ICD-10-CM

## 2019-09-26 DIAGNOSIS — O44.00 COMPLETE PLACENTA PREVIA NOS OR WITHOUT HEMORRHAGE, UNSPECIFIED TRIMESTER: Primary | ICD-10-CM

## 2019-09-26 PROCEDURE — 76816 OB US FOLLOW-UP PER FETUS: CPT

## 2019-09-26 NOTE — TELEPHONE ENCOUNTER
Forms received and filled out. Placed in provider's box to be signed. Will fax to [phone] attn Fabian Duvall, and leave at [facility] for patient to  when returned.  Nataliia Butler,

## 2019-09-26 NOTE — TELEPHONE ENCOUNTER
Forms signed and returned. Faxed to number provided.   Call to patient- aware original is ready to  at [facility], copy sent to HIM.  Nataliia Butler,

## 2019-09-30 ENCOUNTER — MEDICAL CORRESPONDENCE (OUTPATIENT)
Dept: HEALTH INFORMATION MANAGEMENT | Facility: CLINIC | Age: 24
End: 2019-09-30

## 2019-10-07 ENCOUNTER — PRENATAL OFFICE VISIT (OUTPATIENT)
Dept: MIDWIFE SERVICES | Facility: CLINIC | Age: 24
End: 2019-10-07
Payer: COMMERCIAL

## 2019-10-07 VITALS
TEMPERATURE: 98.1 F | BODY MASS INDEX: 21.61 KG/M2 | DIASTOLIC BLOOD PRESSURE: 59 MMHG | WEIGHT: 122 LBS | HEART RATE: 89 BPM | SYSTOLIC BLOOD PRESSURE: 102 MMHG

## 2019-10-07 DIAGNOSIS — Z34.82 ENCOUNTER FOR SUPERVISION OF OTHER NORMAL PREGNANCY, SECOND TRIMESTER: ICD-10-CM

## 2019-10-07 DIAGNOSIS — Z34.80 SUPERVISION OF OTHER NORMAL PREGNANCY, ANTEPARTUM: Primary | ICD-10-CM

## 2019-10-07 LAB
GLUCOSE 1H P 50 G GLC PO SERPL-MCNC: 85 MG/DL (ref 60–129)
HGB BLD-MCNC: 10.6 G/DL (ref 11.7–15.7)

## 2019-10-07 PROCEDURE — 36415 COLL VENOUS BLD VENIPUNCTURE: CPT | Performed by: ADVANCED PRACTICE MIDWIFE

## 2019-10-07 PROCEDURE — 99207 ZZC PRENATAL VISIT: CPT | Performed by: ADVANCED PRACTICE MIDWIFE

## 2019-10-07 PROCEDURE — 82950 GLUCOSE TEST: CPT | Performed by: ADVANCED PRACTICE MIDWIFE

## 2019-10-07 PROCEDURE — 00000218 ZZHCL STATISTIC OBHBG - HEMOGLOBIN: Performed by: ADVANCED PRACTICE MIDWIFE

## 2019-10-07 NOTE — LETTER
October 8, 2019      Sharmayne Giovanni  [address] [address]        Dear ,    We are writing to inform you of your test results.    Your test results fall within the expected range(s) or remain unchanged from previous results.  Please continue with current treatment plan.    Resulted Orders   Glucose tolerance gest screen 1 hour   Result Value Ref Range    Glu Gest Screen 1hr 50g 85 60 - 129 mg/dL   OB hemoglobin   Result Value Ref Range    Hemoglobin 10.6 (L) 11.7 - 15.7 g/dL       If you have any questions or concerns, please call the clinic at the number listed above.       Sincerely,        Yokasta Brink, JOHNATHONM, ANGELA DIEGOM

## 2019-10-07 NOTE — PROGRESS NOTES
24w1d  Feeling tired and having difficulty swallowing, feels like her tonsils and throat is swollen. Reports having nose injury in summer which may be contributing. Reports lack of appetite r/t swallowing and has shown consistent weightloss this pregnancy. Recommended family practice appointment to assess throat/ swallowing problems ASAP. Also recommended calorie dense liquids in the meantime. Discussed placenta previa. Encouraged appointment with OB/GYN if placenta does not move at next ultrasound. She recounted pelvic restrictions that demonstrate understanding. Reports good fetal movement. Denies leaking of fluid, vaginal bleeding, regular uterine contractions, headache or other concerns.  GCT today. RTC in 4 wks. KIRA

## 2019-10-21 ENCOUNTER — MEDICAL CORRESPONDENCE (OUTPATIENT)
Dept: HEALTH INFORMATION MANAGEMENT | Facility: CLINIC | Age: 24
End: 2019-10-21

## 2019-10-24 ENCOUNTER — OFFICE VISIT (OUTPATIENT)
Dept: MATERNAL FETAL MEDICINE | Facility: CLINIC | Age: 24
End: 2019-10-24
Attending: OBSTETRICS & GYNECOLOGY
Payer: COMMERCIAL

## 2019-10-24 ENCOUNTER — HOSPITAL ENCOUNTER (OUTPATIENT)
Dept: ULTRASOUND IMAGING | Facility: CLINIC | Age: 24
Discharge: HOME | End: 2019-10-24
Attending: OBSTETRICS & GYNECOLOGY | Admitting: OBSTETRICS & GYNECOLOGY
Payer: COMMERCIAL

## 2019-10-24 DIAGNOSIS — O44.00 COMPLETE PLACENTA PREVIA NOS OR WITHOUT HEMORRHAGE, UNSPECIFIED TRIMESTER: Primary | ICD-10-CM

## 2019-10-24 DIAGNOSIS — O44.00 COMPLETE PLACENTA PREVIA NOS OR WITHOUT HEMORRHAGE, UNSPECIFIED TRIMESTER: ICD-10-CM

## 2019-10-24 PROCEDURE — 76816 OB US FOLLOW-UP PER FETUS: CPT

## 2019-10-24 NOTE — PROGRESS NOTES
Please see full imaging report from [employer] program under imaging tab.    Patient left prior to discussing findings with a provider. She was contacted via phone and results reviewed. We recommend follow up US in ~ 4 weeks for growth and placenta. She is aware that she will likely need a primary C/S, recommended at [id number] with a complete previa.     Davide Dent MD  Maternal Fetal Medicine

## 2019-10-28 ENCOUNTER — PRENATAL OFFICE VISIT (OUTPATIENT)
Dept: MIDWIFE SERVICES | Facility: CLINIC | Age: 24
End: 2019-10-28
Payer: COMMERCIAL

## 2019-10-28 VITALS
BODY MASS INDEX: 22.67 KG/M2 | WEIGHT: 128 LBS | HEART RATE: 85 BPM | SYSTOLIC BLOOD PRESSURE: 111 MMHG | DIASTOLIC BLOOD PRESSURE: 68 MMHG

## 2019-10-28 DIAGNOSIS — Z34.82 ENCOUNTER FOR SUPERVISION OF OTHER NORMAL PREGNANCY, SECOND TRIMESTER: Primary | ICD-10-CM

## 2019-10-28 PROCEDURE — 99207 ZZC NO BILLABLE SERVICE THIS VISIT: CPT | Performed by: ADVANCED PRACTICE MIDWIFE

## 2019-10-28 NOTE — PROGRESS NOTES
Pt was on the phone with someone talking about a bill she is getting for prenatal care and is very irritated because she has insurance. She got off the phone when I came in the room and was asking why she keeps getting this bill and that she got charged for her ultrasound on 10/24. I explained that this was likely a charge from prior to her insurance becoming active and that it was unlikely from her ultrasound that was 4 days ago. I encouraged her to call insurance to have them run it again, she said that is what she was trying to do. She then said she was too irritated to complete her appointment today and got up and left with her two children.     She is scheduled to have another [facility] US in November but as of now she still has a complete previa. She declines the flu shot and says she will do TDAP at next visit. MML

## 2019-11-21 ENCOUNTER — OFFICE VISIT (OUTPATIENT)
Dept: MATERNAL FETAL MEDICINE | Facility: CLINIC | Age: 24
End: 2019-11-21
Attending: OBSTETRICS & GYNECOLOGY
Payer: COMMERCIAL

## 2019-11-21 ENCOUNTER — HOSPITAL ENCOUNTER (OUTPATIENT)
Dept: ULTRASOUND IMAGING | Facility: CLINIC | Age: 24
Discharge: HOME | End: 2019-11-21
Attending: OBSTETRICS & GYNECOLOGY | Admitting: OBSTETRICS & GYNECOLOGY
Payer: COMMERCIAL

## 2019-11-21 DIAGNOSIS — O44.00 COMPLETE PLACENTA PREVIA NOS OR WITHOUT HEMORRHAGE, UNSPECIFIED TRIMESTER: ICD-10-CM

## 2019-11-21 DIAGNOSIS — O44.03 COMPLETE PLACENTA PREVIA NOS OR WITHOUT HEMORRHAGE, THIRD TRIMESTER: Primary | ICD-10-CM

## 2019-11-21 PROCEDURE — 76816 OB US FOLLOW-UP PER FETUS: CPT

## 2019-11-21 NOTE — PROGRESS NOTES
Please see full imaging report from [employer] program under imaging tab.    Complete placenta previa. Delivery indicated 17k6m-12m3w by primary CS.    Davide Dent MD  Maternal Fetal Medicine

## 2019-11-22 ENCOUNTER — PRENATAL OFFICE VISIT (OUTPATIENT)
Dept: OBGYN | Facility: CLINIC | Age: 24
End: 2019-11-22
Payer: COMMERCIAL

## 2019-11-22 ENCOUNTER — TELEPHONE (OUTPATIENT)
Dept: OBGYN | Facility: CLINIC | Age: 24
End: 2019-11-22

## 2019-11-22 VITALS
TEMPERATURE: 97.5 F | SYSTOLIC BLOOD PRESSURE: 105 MMHG | BODY MASS INDEX: 22.85 KG/M2 | DIASTOLIC BLOOD PRESSURE: 63 MMHG | WEIGHT: 129 LBS | HEART RATE: 91 BPM

## 2019-11-22 DIAGNOSIS — Z23 NEED FOR VACCINATION: ICD-10-CM

## 2019-11-22 DIAGNOSIS — O44.03 COMPLETE PLACENTA PREVIA NOS OR WITHOUT HEMORRHAGE, THIRD TRIMESTER: ICD-10-CM

## 2019-11-22 DIAGNOSIS — O09.93 HIGH-RISK PREGNANCY IN THIRD TRIMESTER: Primary | ICD-10-CM

## 2019-11-22 PROCEDURE — 99207 ZZC PRENATAL VISIT: CPT | Performed by: OBSTETRICS & GYNECOLOGY

## 2019-11-22 PROCEDURE — 90715 TDAP VACCINE 7 YRS/> IM: CPT | Performed by: OBSTETRICS & GYNECOLOGY

## 2019-11-22 PROCEDURE — 90471 IMMUNIZATION ADMIN: CPT | Performed by: OBSTETRICS & GYNECOLOGY

## 2019-11-22 NOTE — TELEPHONE ENCOUNTER
Type of surgery: ob  Location of surgery: [facility] OR  Date and time of surgery: 1/2/20 830a  Surgeon: Chad  Pre-Op Appt Date: tbd, surgeon to do  Post-Op Appt Date: 6 weeks   Packet sent out: Yes  Pre-cert/Authorization completed:  No  Date: 11/22/2019    Dannielle SINGH, Surgery Coordinator

## 2019-11-22 NOTE — PROGRESS NOTES
30w5d  Doing relatively well since last visit.  Having some normal discomforts in pregnancy, but no contractions, vaginal bleeding, or leaking fluid.  Baby is very active.  [facility] recommends primary c/s at 36w0d - 37w6d for complete placenta previa.  Discussed and placed orders to schedule today.  Also reviewed the importance of pelvic rest in pregnancy and the need to come directly to the hospital if she has any vaginal bleeding.  Discussed that we observe typically for at least 48 hours without bleeding the first admission, but some patients do require hospitalization until delivery if bleeding is significant enough or if there are multiple episodes in the pregnancy.  She did not sound excited at this prospect, but did express understanding.  We did discuss contraceptive plans, but she does not desire tubal ligation.  RTC 2w.  Debby Bonilla MD

## 2019-12-05 ENCOUNTER — HOSPITAL ENCOUNTER (OUTPATIENT)
Facility: CLINIC | Age: 24
Discharge: HOME | End: 2019-12-05
Attending: OBSTETRICS & GYNECOLOGY | Admitting: OBSTETRICS & GYNECOLOGY
Payer: COMMERCIAL

## 2019-12-05 VITALS — SYSTOLIC BLOOD PRESSURE: 113 MMHG | TEMPERATURE: 98 F | DIASTOLIC BLOOD PRESSURE: 62 MMHG

## 2019-12-05 PROBLEM — Z36.89 ENCOUNTER FOR TRIAGE IN PREGNANT PATIENT: Status: ACTIVE | Noted: 2019-12-05

## 2019-12-05 LAB
ALBUMIN UR-MCNC: 10 MG/DL
APPEARANCE UR: CLEAR
BACTERIA #/AREA URNS HPF: ABNORMAL /HPF
BILIRUB UR QL STRIP: NEGATIVE
COLOR UR AUTO: YELLOW
GLUCOSE UR STRIP-MCNC: NEGATIVE MG/DL
HGB UR QL STRIP: NEGATIVE
KETONES UR STRIP-MCNC: 40 MG/DL
LEUKOCYTE ESTERASE UR QL STRIP: NEGATIVE
MUCOUS THREADS #/AREA URNS LPF: PRESENT /LPF
NITRATE UR QL: NEGATIVE
PH UR STRIP: 8 PH (ref 5–7)
RBC #/AREA URNS AUTO: <1 /HPF (ref 0–2)
RUPTURE OF FETAL MEMBRANES BY ROM PLUS: NEGATIVE
SOURCE: ABNORMAL
SP GR UR STRIP: 1.01 (ref 1–1.03)
SPECIMEN SOURCE: ABNORMAL
SQUAMOUS #/AREA URNS AUTO: 1 /HPF (ref 0–1)
UROBILINOGEN UR STRIP-MCNC: 2 MG/DL (ref 0–2)
WBC #/AREA URNS AUTO: 1 /HPF (ref 0–5)
WET PREP SPEC: ABNORMAL

## 2019-12-05 PROCEDURE — G0463 HOSPITAL OUTPT CLINIC VISIT: HCPCS | Mod: 25

## 2019-12-05 PROCEDURE — 87591 N.GONORRHOEAE DNA AMP PROB: CPT | Performed by: STUDENT IN AN ORGANIZED HEALTH CARE EDUCATION/TRAINING PROGRAM

## 2019-12-05 PROCEDURE — 81001 URINALYSIS AUTO W/SCOPE: CPT | Performed by: STUDENT IN AN ORGANIZED HEALTH CARE EDUCATION/TRAINING PROGRAM

## 2019-12-05 PROCEDURE — 25800030 ZZH RX IP 258 OP 636

## 2019-12-05 PROCEDURE — G0463 HOSPITAL OUTPT CLINIC VISIT: HCPCS

## 2019-12-05 PROCEDURE — 59025 FETAL NON-STRESS TEST: CPT | Mod: 26 | Performed by: OBSTETRICS & GYNECOLOGY

## 2019-12-05 PROCEDURE — 40000809 ZZH STATISTIC NO DOCUMENTATION TO SUPPORT CHARGE

## 2019-12-05 PROCEDURE — 84112 EVAL AMNIOTIC FLUID PROTEIN: CPT | Performed by: STUDENT IN AN ORGANIZED HEALTH CARE EDUCATION/TRAINING PROGRAM

## 2019-12-05 PROCEDURE — 87491 CHLMYD TRACH DNA AMP PROBE: CPT | Performed by: STUDENT IN AN ORGANIZED HEALTH CARE EDUCATION/TRAINING PROGRAM

## 2019-12-05 PROCEDURE — 59025 FETAL NON-STRESS TEST: CPT

## 2019-12-05 PROCEDURE — 99213 OFFICE O/P EST LOW 20 MIN: CPT | Mod: 25 | Performed by: OBSTETRICS & GYNECOLOGY

## 2019-12-05 PROCEDURE — 96365 THER/PROPH/DIAG IV INF INIT: CPT

## 2019-12-05 PROCEDURE — 40000268 ZZH STATISTIC NO CHARGES

## 2019-12-05 PROCEDURE — 87210 SMEAR WET MOUNT SALINE/INK: CPT | Performed by: STUDENT IN AN ORGANIZED HEALTH CARE EDUCATION/TRAINING PROGRAM

## 2019-12-05 RX ORDER — SODIUM CHLORIDE, SODIUM LACTATE, POTASSIUM CHLORIDE, CALCIUM CHLORIDE 600; 310; 30; 20 MG/100ML; MG/100ML; MG/100ML; MG/100ML
INJECTION, SOLUTION INTRAVENOUS
Status: COMPLETED
Start: 2019-12-05 | End: 2019-12-05

## 2019-12-05 RX ADMIN — SODIUM CHLORIDE, POTASSIUM CHLORIDE, SODIUM LACTATE AND CALCIUM CHLORIDE 500 ML: 600; 310; 30; 20 INJECTION, SOLUTION INTRAVENOUS at 07:25

## 2019-12-05 NOTE — LETTER
December 9, 2019      Sharmayne Morgan  [address]  [place] [address]        Dear ,    We are writing to inform you of your test results.    Your test results fall within the expected range(s) or remain unchanged from previous results.  Please continue with current treatment plan.    Resulted Orders   Neisseria gonorrhoea PCR   Result Value Ref Range    Specimen Descrip Vaginal Rectal     N Gonorrhea PCR Negative NEG^Negative      Comment:      Negative for N. gonorrhoeae rRNA by transcription mediated amplification.  A negative result by transcription mediated amplification does not preclude   the presence of N. gonorrhoeae infection because results are dependent on   proper and adequate collection, absence of inhibitors, and sufficient rRNA to   be detected.  Throat and rectal specimen types have not been cleared by the FDA but have   been validated, demonstrating performance characteristics acceptable by the   [facility] ([facility]) at [facility]. The IDDL is   regulated under CLIA as qualified to perform high-complexity testing. This   test is used for clinical purposes. It should not be regarded as   investigational or for research.     Chlamydia trachomatis PCR   Result Value Ref Range    Specimen Description Vaginal Rectal     Chlamydia Trachomatis PCR Negative NEG^Negative      Comment:      Negative for C. trachomatis rRNA by transcription mediated amplification.  A negative result by transcription mediated amplification does not preclude   the presence of C. trachomatis infection because results are dependent on   proper and adequate collection, absence of inhibitors, and sufficient rRNA to   be detected.  Throat and rectal specimen types have not been cleared by the FDA but have   been validated, demonstrating performance characteristics acceptable by the   [facility] (ID[facility]) at [facility]. The IDDL is   regulated under CLIA as  qualified to perform high-complexity testing. This   test is used for clinical purposes. It should not be regarded as   investigational or for research.         If you have any questions or concerns, please call the clinic at the number listed above.       Sincerely,        No name on file.

## 2019-12-05 NOTE — LETTER
December 5, 2019      Sharmayne Morgan  [address]  [place] [address]        To Whom It May Concern:    Sharmayne Morgan was seen in the hospital 12/5/19. Estimated Date of Delivery: Jan 26, 2020 She may return to work.    Sincerely,          Judith Valentin MD

## 2019-12-05 NOTE — PLAN OF CARE
Data: [facility] at 0635.   Reason for maternal/fetal assessment per patient is Rule Out Labor  . Patient is a . Prenatal record reviewed.      OB History    Para Term  AB Living   8 3 3 0 4 3   SAB TAB Ectopic Multiple Live Births   3 0 0 0 3      # Outcome Date GA Lbr Billy/2nd Weight Sex Delivery Anes PTL Lv   8 Current            7 Term 18 39w4d 02:39 / 00:30 3.81 kg (8 lb 6.4 oz) M Vag-Spont None N DARIUSZ      Complications: Prolonged PROM (>18 hours)      Name: MORGAN,BABY1 SHARMAYNE      Apgar1: 7  Apgar5: 9   6 AB 17 8w5d          5 Term 14 40w0d  3.005 kg (6 lb 10 oz) F  None N DARIUSZ   4 Term 09 41w0d  3.657 kg (8 lb 1 oz) M  None N DARIUSZ   3 SAB 08     AB, COMPLETE      2 SAB            1 SAB               Medical History:   Past Medical History:   Diagnosis Date    Anxiety     Depressive disorder     Manic depressive disorder (H)     Severe recurrent majr depression w/psychotc features, mood-incongruent (H) 2017    Hallucinations - visual and auditory    Trauma in childhood    . Gestational Age 32w4d. VSS. Cervix: closed upon sterile speculum exam.  Fetal movement present. Patient denies cramping, backache, pelvic pressure, UTI symptoms, GI problems, bloody show, vaginal bleeding, edema, headache, visual disturbances, epigastric or URQ pain, abdominal pain, rupture of membranes.   Action: Verbal consent for EFM. Triage assessment completed. EFM applied. Uterine assessment jacque q2-7. Fetal assessment: Presumed adequate fetal oxygenation documented (see flow record)  Response: Dr. Mcgill, G2 informed of arrival. Plan per provider is send labs and give IV fluids. Patient verbalized understanding of education and verbalized agreement with plan.

## 2019-12-05 NOTE — PLAN OF CARE
After labs resulted, IV fluid bolus and fetal monitoring,  MD discharged patient.  Discharge paperwork provided with kick count.  Pt has no further questions.  Will follow up in 1 week.  Discharge to home undelivered.

## 2019-12-05 NOTE — PROGRESS NOTES
Obstetrics Triage Note    HPI:  Sharmayne Morgan is a 24 year old  at 32w4d by LMP c/w 12w4d US, pregnancy complicated by complete placenta previa, here with complaints of leakage of luid.      Patient states that she has been feeling wetness in her vagina fairly consistently. No big gushes of fluid. Not feeling any contractions but says she doesn't feel contractions until right before delivery. No VB.  + FM.     Pregnancy is complicated by:  - Complete placenta previa  - Depression    ROS:  Negative except as mentioned in HPI.    PMH:  Past Medical History:   Diagnosis Date     Anxiety      Depressive disorder      Manic depressive disorder (H)      Severe recurrent majr depression w/psychotc features, mood-incongruent (H) 2017    Hallucinations - visual and auditory     Trauma in childhood        PSHx:  Past Surgical History:   Procedure Laterality Date     NO HISTORY OF SURGERY         Medications:  No current facility-administered medications for this encounter.      Current Outpatient Medications   Medication     Benzoyl Peroxide (BENZOYL PEROXIDE CLEANSER) 6 % LOTN     QUEtiapine (SEROQUEL) 25 MG tablet       Allergies:     Allergies   Allergen Reactions     Seasonal Allergies        Physical Exam:   Vitals:    19 0540   BP: 113/62   Temp: 98  F (36.7  C)   TempSrc: Oral      Gen: resting comfortably, in NAD  CV: Regular rate  Pulm:  no increased work of breathing  Abd: soft, gravid, non-tender, non-distended  Ext: no calf tenderness     SSE: cervix visually closed, thin discharge, no pooling, no bleeding    NST:  FHT: , moderate variability, + accelerations, no decelerations  [place]: irritability    Labs:  Results for orders placed or performed during the hospital encounter of 19   UA with Microscopic reflex to Culture     Status: Abnormal   Result Value Ref Range    Color Urine Yellow     Appearance Urine Clear     Glucose Urine Negative NEG^Negative mg/dL    Bilirubin Urine  Negative NEG^Negative    Ketones Urine 40 (A) NEG^Negative mg/dL    Specific Gravity Urine 1.015 1.003 - 1.035    Blood Urine Negative NEG^Negative    pH Urine 8.0 (H) 5.0 - 7.0 pH    Protein Albumin Urine 10 (A) NEG^Negative mg/dL    Urobilinogen mg/dL 2.0 0.0 - 2.0 mg/dL    Nitrite Urine Negative NEG^Negative    Leukocyte Esterase Urine Negative NEG^Negative    Source Clean catch urine     WBC Urine 1 0 - 5 /HPF    RBC Urine <1 0 - 2 /HPF    Bacteria Urine Few (A) NEG^Negative /HPF    Squamous Epithelial /HPF Urine 1 0 - 1 /HPF    Mucous Urine Present (A) NEG^Negative /LPF   Rupture of Fetal Membranes by ROM Plus     Status: None   Result Value Ref Range    Rupture of Fetal Membranes by ROM Plus Negative NEG^Negative   Wet prep     Status: Abnormal   Result Value Ref Range    Specimen Description Vaginal Rectal     Wet Prep No yeast seen     Wet Prep No motile Trichomonas seen     Wet Prep Few  Clue cells seen   (A)     Wet Prep Moderate  PMNs seen            Assessment/Plan: Sharmayne Morgan is a 24 year old  at 32w4d by LMP c/w 12w4d US, here for contractions and concern for leaking of fluid.     1. Rule out PPROM  - exam not concerning for ROM, ROM+ negative    2. Rule out PTL  - ctx/irritability improved after IVF bolus  - infectious testing overall normal    Clinical picture not concerning for PPROM, PTL, or infection at this time.  - Dispo: to home with follow up in the next week. Discussed labor warning signs and indication to return to care.     Amber Kirkland MD  Ob/Gyn PGY-3  19 8:31 AM        Physician Attestation   I, ZENAIDA REDDY MD, saw this patient with the resident and agree with the resident/fellow's findings and plan of care as documented in the note.      I personally reviewed vital signs, labs and NST.    Key findings: NST reactive. ROM plus is negative and pt reassured, no infection. Not having contractions after IV fluid bolus and feels ready for discharge. Has f/u in  clinic already scheduled. PTL reviewed.    ZENAIDA REDDY MD  Date of Service (when I saw the patient): 12/05/19

## 2019-12-05 NOTE — DISCHARGE INSTRUCTIONS
Discharge Instruction for Undelivered Patients      You were seen for: Membrane Assessment  We Consulted: Dr Valentin  You had (Test or Medicine):ROM +, wet prep, GC/Chlam     Diet:   Drink 8 to 12 glasses of liquids (milk, juice, water) every day.  You may eat meals and snacks.     Activity:  Count fetal kicks everyday (see handout)  Call your doctor or nurse midwife if your baby is moving less than usual.     Call your provider if you notice:  Swelling in your face or increased swelling in your hands or legs.  Headaches that are not relieved by Tylenol (acetaminophen).  Changes in your vision (blurring: seeing spots or stars.)  Nausea (sick to your stomach) and vomiting (throwing up).   Weight gain of 5 pounds or more per week.  Heartburn that doesn't go away.  Signs of bladder infection: pain when you urinate (use the toilet), need to go more often and more urgently.  The bag of gonzalez (rupture of membranes) breaks, or you notice leaking in your underwear.  Bright red blood in your underwear.  Abdominal (lower belly) or stomach pain.  For first baby: Contractions (tightening) less than 5 minutes apart for one hour or more.  Second (plus) baby: Contractions (tightening) less than 10 minutes apart and getting stronger.  *If less than 34 weeks: Contractions (tightenings) more than 6 times in one hour.  Increase or change in vaginal discharge (note the color and amount)  Other: .    Follow-up:  with in one week

## 2019-12-05 NOTE — ED NOTES
.Pregnant 25 yo F. In with labor pains. Water broke. Unsure if feeling to push present as patient brought up.

## 2019-12-06 LAB
C TRACH DNA SPEC QL NAA+PROBE: NEGATIVE
N GONORRHOEA DNA SPEC QL NAA+PROBE: NEGATIVE
SPECIMEN SOURCE: NORMAL
SPECIMEN SOURCE: NORMAL

## 2019-12-10 ENCOUNTER — PRENATAL OFFICE VISIT (OUTPATIENT)
Dept: OBGYN | Facility: CLINIC | Age: 24
End: 2019-12-10
Payer: COMMERCIAL

## 2019-12-10 VITALS
BODY MASS INDEX: 23.63 KG/M2 | SYSTOLIC BLOOD PRESSURE: 118 MMHG | HEART RATE: 83 BPM | WEIGHT: 133.4 LBS | DIASTOLIC BLOOD PRESSURE: 76 MMHG

## 2019-12-10 DIAGNOSIS — O09.93 HIGH-RISK PREGNANCY IN THIRD TRIMESTER: Primary | ICD-10-CM

## 2019-12-10 DIAGNOSIS — B96.89 BV (BACTERIAL VAGINOSIS): ICD-10-CM

## 2019-12-10 DIAGNOSIS — N76.0 BV (BACTERIAL VAGINOSIS): ICD-10-CM

## 2019-12-10 PROCEDURE — 99207 ZZC PRENATAL VISIT: CPT | Performed by: OBSTETRICS & GYNECOLOGY

## 2019-12-10 RX ORDER — PNV NO.95/FERROUS FUM/FOLIC AC 28MG-0.8MG
1 TABLET ORAL DAILY
Qty: 200 TABLET | Refills: 0 | Status: SHIPPED | OUTPATIENT
Start: 2019-12-10 | End: 2020-03-23

## 2019-12-10 RX ORDER — METRONIDAZOLE 500 MG/1
500 TABLET ORAL 2 TIMES DAILY
Qty: 14 TABLET | Refills: 0 | Status: ON HOLD | OUTPATIENT
Start: 2019-12-10 | End: 2020-01-03

## 2019-12-10 NOTE — PROGRESS NOTES
33w2d feeling ok, but seen on L&D about 5 days ago for possible LOF and vulvar itching and irritation.  ROM+ was negative and wet prep showed clue cells, no treatment given.  She continues to have bothersome vulvar symptoms, so will rx flagyl and rx faxed.  No vaginal bleeding.  Good fm.  RTC weekly  jlp

## 2019-12-19 ENCOUNTER — OFFICE VISIT (OUTPATIENT)
Dept: MATERNAL FETAL MEDICINE | Facility: CLINIC | Age: 24
End: 2019-12-19
Attending: OBSTETRICS & GYNECOLOGY
Payer: COMMERCIAL

## 2019-12-19 ENCOUNTER — HOSPITAL ENCOUNTER (OUTPATIENT)
Dept: ULTRASOUND IMAGING | Facility: CLINIC | Age: 24
Discharge: HOME | End: 2019-12-19
Attending: OBSTETRICS & GYNECOLOGY | Admitting: OBSTETRICS & GYNECOLOGY
Payer: COMMERCIAL

## 2019-12-19 DIAGNOSIS — O44.03 PLACENTA PREVIA ANTEPARTUM IN THIRD TRIMESTER: Primary | ICD-10-CM

## 2019-12-19 DIAGNOSIS — O44.03 COMPLETE PLACENTA PREVIA NOS OR WITHOUT HEMORRHAGE, THIRD TRIMESTER: ICD-10-CM

## 2019-12-19 PROCEDURE — 76816 OB US FOLLOW-UP PER FETUS: CPT

## 2019-12-19 NOTE — PROGRESS NOTES
Please see full imaging report from [facility] program under imaging tab.    Still with partial placenta previa, deborah-right lateral but inferior edge extending to internal cervical os.     Davide Dent MD  Maternal Fetal Medicine

## 2019-12-24 DIAGNOSIS — Z01.818 PRE-OP EXAM: Primary | ICD-10-CM

## 2019-12-24 DIAGNOSIS — O44.00 PLACENTA PREVIA: ICD-10-CM

## 2019-12-31 DIAGNOSIS — O44.00 PLACENTA PREVIA: ICD-10-CM

## 2019-12-31 DIAGNOSIS — Z01.818 PRE-OP EXAM: ICD-10-CM

## 2019-12-31 LAB
ABO + RH BLD: NORMAL
ABO + RH BLD: NORMAL
BLD GP AB SCN SERPL QL: NORMAL
BLOOD BANK CMNT PATIENT-IMP: NORMAL
ERYTHROCYTE [DISTWIDTH] IN BLOOD BY AUTOMATED COUNT: 13.4 % (ref 10–15)
HCT VFR BLD AUTO: 31.8 % (ref 35–47)
HGB BLD-MCNC: 10.3 G/DL (ref 11.7–15.7)
MCH RBC QN AUTO: 28.4 PG (ref 26.5–33)
MCHC RBC AUTO-ENTMCNC: 32.4 G/DL (ref 31.5–36.5)
MCV RBC AUTO: 88 FL (ref 78–100)
PLATELET # BLD AUTO: 229 10E9/L (ref 150–450)
RBC # BLD AUTO: 3.63 10E12/L (ref 3.8–5.2)
SPECIMEN EXP DATE BLD: NORMAL
WBC # BLD AUTO: 6.5 10E9/L (ref 4–11)

## 2019-12-31 PROCEDURE — 86780 TREPONEMA PALLIDUM: CPT | Performed by: OBSTETRICS & GYNECOLOGY

## 2019-12-31 PROCEDURE — 86900 BLOOD TYPING SEROLOGIC ABO: CPT | Performed by: OBSTETRICS & GYNECOLOGY

## 2019-12-31 PROCEDURE — 86850 RBC ANTIBODY SCREEN: CPT | Performed by: OBSTETRICS & GYNECOLOGY

## 2019-12-31 PROCEDURE — 36415 COLL VENOUS BLD VENIPUNCTURE: CPT | Performed by: OBSTETRICS & GYNECOLOGY

## 2019-12-31 PROCEDURE — 86901 BLOOD TYPING SEROLOGIC RH(D): CPT | Performed by: OBSTETRICS & GYNECOLOGY

## 2019-12-31 PROCEDURE — 85027 COMPLETE CBC AUTOMATED: CPT | Performed by: OBSTETRICS & GYNECOLOGY

## 2020-01-02 ENCOUNTER — ANESTHESIA (OUTPATIENT)
Dept: OBGYN | Facility: CLINIC | Age: 25
End: 2020-01-02
Payer: COMMERCIAL

## 2020-01-02 ENCOUNTER — ANESTHESIA EVENT (OUTPATIENT)
Dept: OBGYN | Facility: CLINIC | Age: 25
End: 2020-01-02
Payer: COMMERCIAL

## 2020-01-02 ENCOUNTER — HOSPITAL ENCOUNTER (INPATIENT)
Facility: CLINIC | Age: 25
LOS: 2 days | Discharge: HOME | End: 2020-01-04
Attending: OBSTETRICS & GYNECOLOGY | Admitting: OBSTETRICS & GYNECOLOGY
Payer: COMMERCIAL

## 2020-01-02 DIAGNOSIS — O09.93 HIGH-RISK PREGNANCY IN THIRD TRIMESTER: ICD-10-CM

## 2020-01-02 DIAGNOSIS — D62 ANEMIA DUE TO BLOOD LOSS, ACUTE: Primary | ICD-10-CM

## 2020-01-02 DIAGNOSIS — Z98.891 S/P CESAREAN SECTION: ICD-10-CM

## 2020-01-02 DIAGNOSIS — O44.03 COMPLETE PLACENTA PREVIA NOS OR WITHOUT HEMORRHAGE, THIRD TRIMESTER: ICD-10-CM

## 2020-01-02 PROCEDURE — 59514 CESAREAN DELIVERY ONLY: CPT | Mod: 80 | Performed by: OBSTETRICS & GYNECOLOGY

## 2020-01-02 PROCEDURE — 88307 TISSUE EXAM BY PATHOLOGIST: CPT | Mod: 26 | Performed by: STUDENT IN AN ORGANIZED HEALTH CARE EDUCATION/TRAINING PROGRAM

## 2020-01-02 PROCEDURE — 25800030 ZZH RX IP 258 OP 636: Performed by: OBSTETRICS & GYNECOLOGY

## 2020-01-02 PROCEDURE — 36000059 ZZH SURGERY LEVEL 3 EA 15 ADDTL MIN UMMC: Performed by: OBSTETRICS & GYNECOLOGY

## 2020-01-02 PROCEDURE — 25000125 ZZHC RX 250: Performed by: STUDENT IN AN ORGANIZED HEALTH CARE EDUCATION/TRAINING PROGRAM

## 2020-01-02 PROCEDURE — 71000014 ZZH RECOVERY PHASE 1 LEVEL 2 FIRST HR: Performed by: OBSTETRICS & GYNECOLOGY

## 2020-01-02 PROCEDURE — 25000565 ZZH ISOFLURANE, EA 15 MIN: Performed by: OBSTETRICS & GYNECOLOGY

## 2020-01-02 PROCEDURE — 25000132 ZZH RX MED GY IP 250 OP 250 PS 637: Performed by: STUDENT IN AN ORGANIZED HEALTH CARE EDUCATION/TRAINING PROGRAM

## 2020-01-02 PROCEDURE — 88307 TISSUE EXAM BY PATHOLOGIST: CPT | Performed by: STUDENT IN AN ORGANIZED HEALTH CARE EDUCATION/TRAINING PROGRAM

## 2020-01-02 PROCEDURE — 25000125 ZZHC RX 250: Performed by: NURSE ANESTHETIST, CERTIFIED REGISTERED

## 2020-01-02 PROCEDURE — 36000057 ZZH SURGERY LEVEL 3 1ST 30 MIN - UMMC: Performed by: OBSTETRICS & GYNECOLOGY

## 2020-01-02 PROCEDURE — 37000009 ZZH ANESTHESIA TECHNICAL FEE, EACH ADDTL 15 MIN: Performed by: OBSTETRICS & GYNECOLOGY

## 2020-01-02 PROCEDURE — 25000128 H RX IP 250 OP 636: Performed by: NURSE ANESTHETIST, CERTIFIED REGISTERED

## 2020-01-02 PROCEDURE — 12000001 ZZH R&B MED SURG/OB UMMC

## 2020-01-02 PROCEDURE — 25800030 ZZH RX IP 258 OP 636: Performed by: NURSE ANESTHETIST, CERTIFIED REGISTERED

## 2020-01-02 PROCEDURE — 40000977 ZZH STATISTIC ATTENDANCE AT DELIVERY

## 2020-01-02 PROCEDURE — 27210794 ZZH OR GENERAL SUPPLY STERILE: Performed by: OBSTETRICS & GYNECOLOGY

## 2020-01-02 PROCEDURE — 25000128 H RX IP 250 OP 636: Performed by: STUDENT IN AN ORGANIZED HEALTH CARE EDUCATION/TRAINING PROGRAM

## 2020-01-02 PROCEDURE — 40000170 ZZH STATISTIC PRE-PROCEDURE ASSESSMENT II: Performed by: OBSTETRICS & GYNECOLOGY

## 2020-01-02 PROCEDURE — 59515 CESAREAN DELIVERY: CPT | Mod: GC | Performed by: OBSTETRICS & GYNECOLOGY

## 2020-01-02 PROCEDURE — 25000132 ZZH RX MED GY IP 250 OP 250 PS 637: Performed by: OBSTETRICS & GYNECOLOGY

## 2020-01-02 PROCEDURE — 27110028 ZZH OR GENERAL SUPPLY NON-STERILE: Performed by: OBSTETRICS & GYNECOLOGY

## 2020-01-02 PROCEDURE — C1765 ADHESION BARRIER: HCPCS | Performed by: OBSTETRICS & GYNECOLOGY

## 2020-01-02 PROCEDURE — 25000128 H RX IP 250 OP 636: Performed by: OBSTETRICS & GYNECOLOGY

## 2020-01-02 PROCEDURE — C9290 INJ, BUPIVACAINE LIPOSOME: HCPCS | Performed by: STUDENT IN AN ORGANIZED HEALTH CARE EDUCATION/TRAINING PROGRAM

## 2020-01-02 PROCEDURE — 37000008 ZZH ANESTHESIA TECHNICAL FEE, 1ST 30 MIN: Performed by: OBSTETRICS & GYNECOLOGY

## 2020-01-02 PROCEDURE — 25000128 H RX IP 250 OP 636: Performed by: ANESTHESIOLOGY

## 2020-01-02 RX ORDER — MISOPROSTOL 200 UG/1
800 TABLET ORAL
Status: DISCONTINUED | OUTPATIENT
Start: 2020-01-02 | End: 2020-01-04 | Stop reason: HOSPADM

## 2020-01-02 RX ORDER — ACETAMINOPHEN 325 MG/1
975 TABLET ORAL EVERY 8 HOURS
Status: DISCONTINUED | OUTPATIENT
Start: 2020-01-02 | End: 2020-01-04 | Stop reason: HOSPADM

## 2020-01-02 RX ORDER — MORPHINE SULFATE 1 MG/ML
INJECTION, SOLUTION EPIDURAL; INTRATHECAL; INTRAVENOUS PRN
Status: DISCONTINUED | OUTPATIENT
Start: 2020-01-02 | End: 2020-01-02

## 2020-01-02 RX ORDER — HYDROCORTISONE 2.5 %
CREAM (GRAM) TOPICAL 3 TIMES DAILY PRN
Status: DISCONTINUED | OUTPATIENT
Start: 2020-01-02 | End: 2020-01-04 | Stop reason: HOSPADM

## 2020-01-02 RX ORDER — ONDANSETRON 2 MG/ML
4 INJECTION INTRAMUSCULAR; INTRAVENOUS EVERY 6 HOURS PRN
Status: DISCONTINUED | OUTPATIENT
Start: 2020-01-02 | End: 2020-01-04 | Stop reason: HOSPADM

## 2020-01-02 RX ORDER — CARBOPROST TROMETHAMINE 250 UG/ML
250 INJECTION, SOLUTION INTRAMUSCULAR
Status: DISCONTINUED | OUTPATIENT
Start: 2020-01-02 | End: 2020-01-04 | Stop reason: HOSPADM

## 2020-01-02 RX ORDER — FENTANYL CITRATE 50 UG/ML
INJECTION, SOLUTION INTRAMUSCULAR; INTRAVENOUS PRN
Status: DISCONTINUED | OUTPATIENT
Start: 2020-01-02 | End: 2020-01-02

## 2020-01-02 RX ORDER — SODIUM CHLORIDE, SODIUM LACTATE, POTASSIUM CHLORIDE, CALCIUM CHLORIDE 600; 310; 30; 20 MG/100ML; MG/100ML; MG/100ML; MG/100ML
INJECTION, SOLUTION INTRAVENOUS
Status: DISCONTINUED
Start: 2020-01-02 | End: 2020-01-02 | Stop reason: HOSPADM

## 2020-01-02 RX ORDER — ACETAMINOPHEN 325 MG/1
650 TABLET ORAL EVERY 4 HOURS PRN
Status: DISCONTINUED | OUTPATIENT
Start: 2020-01-05 | End: 2020-01-04 | Stop reason: HOSPADM

## 2020-01-02 RX ORDER — DEXTROSE, SODIUM CHLORIDE, SODIUM LACTATE, POTASSIUM CHLORIDE, AND CALCIUM CHLORIDE 5; .6; .31; .03; .02 G/100ML; G/100ML; G/100ML; G/100ML; G/100ML
INJECTION, SOLUTION INTRAVENOUS CONTINUOUS
Status: DISCONTINUED | OUTPATIENT
Start: 2020-01-02 | End: 2020-01-04 | Stop reason: HOSPADM

## 2020-01-02 RX ORDER — METHYLERGONOVINE MALEATE 0.2 MG/ML
200 INJECTION INTRAVENOUS
Status: DISCONTINUED | OUTPATIENT
Start: 2020-01-02 | End: 2020-01-04 | Stop reason: HOSPADM

## 2020-01-02 RX ORDER — EPHEDRINE SULFATE 50 MG/ML
5 INJECTION, SOLUTION INTRAMUSCULAR; INTRAVENOUS; SUBCUTANEOUS
Status: DISCONTINUED | OUTPATIENT
Start: 2020-01-02 | End: 2020-01-02

## 2020-01-02 RX ORDER — OXYTOCIN 10 [USP'U]/ML
10 INJECTION, SOLUTION INTRAMUSCULAR; INTRAVENOUS
Status: DISCONTINUED | OUTPATIENT
Start: 2020-01-02 | End: 2020-01-04 | Stop reason: HOSPADM

## 2020-01-02 RX ORDER — LIDOCAINE 40 MG/G
CREAM TOPICAL
Status: DISCONTINUED | OUTPATIENT
Start: 2020-01-02 | End: 2020-01-04 | Stop reason: HOSPADM

## 2020-01-02 RX ORDER — PHENYLEPHRINE HCL IN 0.9% NACL 1 MG/10 ML
SYRINGE (ML) INTRAVENOUS CONTINUOUS PRN
Status: DISCONTINUED | OUTPATIENT
Start: 2020-01-02 | End: 2020-01-02

## 2020-01-02 RX ORDER — CEFAZOLIN SODIUM 1 G/3ML
1 INJECTION, POWDER, FOR SOLUTION INTRAMUSCULAR; INTRAVENOUS SEE ADMIN INSTRUCTIONS
Status: DISCONTINUED | OUTPATIENT
Start: 2020-01-02 | End: 2020-01-02 | Stop reason: HOSPADM

## 2020-01-02 RX ORDER — ONDANSETRON 2 MG/ML
4 INJECTION INTRAMUSCULAR; INTRAVENOUS EVERY 30 MIN PRN
Status: DISCONTINUED | OUTPATIENT
Start: 2020-01-02 | End: 2020-01-02 | Stop reason: HOSPADM

## 2020-01-02 RX ORDER — NALBUPHINE HYDROCHLORIDE 10 MG/ML
2.5-5 INJECTION, SOLUTION INTRAMUSCULAR; INTRAVENOUS; SUBCUTANEOUS EVERY 6 HOURS PRN
Status: DISCONTINUED | OUTPATIENT
Start: 2020-01-02 | End: 2020-01-02

## 2020-01-02 RX ORDER — NALOXONE HYDROCHLORIDE 0.4 MG/ML
.1-.4 INJECTION, SOLUTION INTRAMUSCULAR; INTRAVENOUS; SUBCUTANEOUS
Status: ACTIVE | OUTPATIENT
Start: 2020-01-02 | End: 2020-01-03

## 2020-01-02 RX ORDER — SODIUM CHLORIDE, SODIUM LACTATE, POTASSIUM CHLORIDE, CALCIUM CHLORIDE 600; 310; 30; 20 MG/100ML; MG/100ML; MG/100ML; MG/100ML
INJECTION, SOLUTION INTRAVENOUS CONTINUOUS
Status: DISCONTINUED | OUTPATIENT
Start: 2020-01-02 | End: 2020-01-02 | Stop reason: HOSPADM

## 2020-01-02 RX ORDER — NALOXONE HYDROCHLORIDE 0.4 MG/ML
.1-.4 INJECTION, SOLUTION INTRAMUSCULAR; INTRAVENOUS; SUBCUTANEOUS
Status: DISCONTINUED | OUTPATIENT
Start: 2020-01-02 | End: 2020-01-02

## 2020-01-02 RX ORDER — ONDANSETRON 4 MG/1
4 TABLET, ORALLY DISINTEGRATING ORAL EVERY 30 MIN PRN
Status: DISCONTINUED | OUTPATIENT
Start: 2020-01-02 | End: 2020-01-02 | Stop reason: HOSPADM

## 2020-01-02 RX ORDER — OXYTOCIN/0.9 % SODIUM CHLORIDE 30/500 ML
100 PLASTIC BAG, INJECTION (ML) INTRAVENOUS CONTINUOUS
Status: DISCONTINUED | OUTPATIENT
Start: 2020-01-02 | End: 2020-01-04 | Stop reason: HOSPADM

## 2020-01-02 RX ORDER — METOCLOPRAMIDE HYDROCHLORIDE 5 MG/ML
10 INJECTION INTRAMUSCULAR; INTRAVENOUS EVERY 6 HOURS PRN
Status: DISCONTINUED | OUTPATIENT
Start: 2020-01-02 | End: 2020-01-04 | Stop reason: HOSPADM

## 2020-01-02 RX ORDER — AMOXICILLIN 250 MG
1 CAPSULE ORAL 2 TIMES DAILY PRN
Status: DISCONTINUED | OUTPATIENT
Start: 2020-01-02 | End: 2020-01-04 | Stop reason: HOSPADM

## 2020-01-02 RX ORDER — BUPIVACAINE HYDROCHLORIDE 2.5 MG/ML
INJECTION, SOLUTION EPIDURAL; INFILTRATION; INTRACAUDAL PRN
Status: DISCONTINUED | OUTPATIENT
Start: 2020-01-02 | End: 2020-01-02

## 2020-01-02 RX ORDER — LIDOCAINE 40 MG/G
CREAM TOPICAL
Status: DISCONTINUED | OUTPATIENT
Start: 2020-01-02 | End: 2020-01-02

## 2020-01-02 RX ORDER — CEFAZOLIN SODIUM 2 G/100ML
2 INJECTION, SOLUTION INTRAVENOUS
Status: COMPLETED | OUTPATIENT
Start: 2020-01-02 | End: 2020-01-02

## 2020-01-02 RX ORDER — KETOROLAC TROMETHAMINE 30 MG/ML
30 INJECTION, SOLUTION INTRAMUSCULAR; INTRAVENOUS EVERY 6 HOURS
Status: COMPLETED | OUTPATIENT
Start: 2020-01-02 | End: 2020-01-03

## 2020-01-02 RX ORDER — OXYTOCIN/0.9 % SODIUM CHLORIDE 30/500 ML
PLASTIC BAG, INJECTION (ML) INTRAVENOUS CONTINUOUS PRN
Status: DISCONTINUED | OUTPATIENT
Start: 2020-01-02 | End: 2020-01-02

## 2020-01-02 RX ORDER — OXYCODONE HYDROCHLORIDE 5 MG/1
5-10 TABLET ORAL
Status: DISCONTINUED | OUTPATIENT
Start: 2020-01-02 | End: 2020-01-04 | Stop reason: HOSPADM

## 2020-01-02 RX ORDER — NALOXONE HYDROCHLORIDE 0.4 MG/ML
.1-.4 INJECTION, SOLUTION INTRAMUSCULAR; INTRAVENOUS; SUBCUTANEOUS
Status: DISCONTINUED | OUTPATIENT
Start: 2020-01-02 | End: 2020-01-04 | Stop reason: HOSPADM

## 2020-01-02 RX ORDER — FENTANYL CITRATE 50 UG/ML
25-50 INJECTION, SOLUTION INTRAMUSCULAR; INTRAVENOUS
Status: DISCONTINUED | OUTPATIENT
Start: 2020-01-02 | End: 2020-01-02 | Stop reason: HOSPADM

## 2020-01-02 RX ORDER — OXYTOCIN/0.9 % SODIUM CHLORIDE 30/500 ML
340 PLASTIC BAG, INJECTION (ML) INTRAVENOUS CONTINUOUS PRN
Status: DISCONTINUED | OUTPATIENT
Start: 2020-01-02 | End: 2020-01-04 | Stop reason: HOSPADM

## 2020-01-02 RX ORDER — ONDANSETRON 2 MG/ML
INJECTION INTRAMUSCULAR; INTRAVENOUS PRN
Status: DISCONTINUED | OUTPATIENT
Start: 2020-01-02 | End: 2020-01-02

## 2020-01-02 RX ORDER — IBUPROFEN 800 MG/1
800 TABLET, FILM COATED ORAL EVERY 6 HOURS PRN
Status: DISCONTINUED | OUTPATIENT
Start: 2020-01-02 | End: 2020-01-04 | Stop reason: HOSPADM

## 2020-01-02 RX ORDER — CITRIC ACID/SODIUM CITRATE 334-500MG
30 SOLUTION, ORAL ORAL
Status: COMPLETED | OUTPATIENT
Start: 2020-01-02 | End: 2020-01-02

## 2020-01-02 RX ORDER — HYDROMORPHONE HYDROCHLORIDE 1 MG/ML
.3-.5 INJECTION, SOLUTION INTRAMUSCULAR; INTRAVENOUS; SUBCUTANEOUS EVERY 5 MIN PRN
Status: DISCONTINUED | OUTPATIENT
Start: 2020-01-02 | End: 2020-01-02 | Stop reason: HOSPADM

## 2020-01-02 RX ORDER — SIMETHICONE 80 MG
80 TABLET,CHEWABLE ORAL 4 TIMES DAILY PRN
Status: DISCONTINUED | OUTPATIENT
Start: 2020-01-02 | End: 2020-01-04 | Stop reason: HOSPADM

## 2020-01-02 RX ORDER — AMOXICILLIN 250 MG
2 CAPSULE ORAL 2 TIMES DAILY PRN
Status: DISCONTINUED | OUTPATIENT
Start: 2020-01-02 | End: 2020-01-04 | Stop reason: HOSPADM

## 2020-01-02 RX ORDER — BUPIVACAINE HYDROCHLORIDE 7.5 MG/ML
INJECTION, SOLUTION INTRASPINAL PRN
Status: DISCONTINUED | OUTPATIENT
Start: 2020-01-02 | End: 2020-01-02

## 2020-01-02 RX ORDER — MORPHINE SULFATE 1 MG/ML
150 INJECTION, SOLUTION EPIDURAL; INTRATHECAL; INTRAVENOUS ONCE
Status: DISCONTINUED | OUTPATIENT
Start: 2020-01-02 | End: 2020-01-02

## 2020-01-02 RX ORDER — BISACODYL 10 MG
10 SUPPOSITORY, RECTAL RECTAL DAILY PRN
Status: DISCONTINUED | OUTPATIENT
Start: 2020-01-04 | End: 2020-01-04 | Stop reason: HOSPADM

## 2020-01-02 RX ORDER — SODIUM CHLORIDE, SODIUM LACTATE, POTASSIUM CHLORIDE, CALCIUM CHLORIDE 600; 310; 30; 20 MG/100ML; MG/100ML; MG/100ML; MG/100ML
INJECTION, SOLUTION INTRAVENOUS CONTINUOUS PRN
Status: DISCONTINUED | OUTPATIENT
Start: 2020-01-02 | End: 2020-01-02

## 2020-01-02 RX ORDER — LANOLIN 100 %
OINTMENT (GRAM) TOPICAL
Status: DISCONTINUED | OUTPATIENT
Start: 2020-01-02 | End: 2020-01-04 | Stop reason: HOSPADM

## 2020-01-02 RX ADMIN — BUPIVACAINE HYDROCHLORIDE IN DEXTROSE 1.6 ML: 7.5 INJECTION, SOLUTION SUBARACHNOID at 11:23

## 2020-01-02 RX ADMIN — ACETAMINOPHEN 975 MG: 325 TABLET, FILM COATED ORAL at 13:43

## 2020-01-02 RX ADMIN — BUPIVACAINE 20 ML: 13.3 INJECTION, SUSPENSION, LIPOSOMAL INFILTRATION at 12:55

## 2020-01-02 RX ADMIN — PHENYLEPHRINE HYDROCHLORIDE 50 MCG: 10 INJECTION INTRAVENOUS at 11:45

## 2020-01-02 RX ADMIN — OXYTOCIN-SODIUM CHLORIDE 0.9% IV SOLN 30 UNIT/500ML 100 ML/HR: 30-0.9/5 SOLUTION at 16:28

## 2020-01-02 RX ADMIN — MORPHINE SULFATE 0.15 MG: 1 INJECTION, SOLUTION EPIDURAL; INTRATHECAL; INTRAVENOUS at 11:23

## 2020-01-02 RX ADMIN — SODIUM CITRATE AND CITRIC ACID MONOHYDRATE 30 ML: 500; 334 SOLUTION ORAL at 11:02

## 2020-01-02 RX ADMIN — ONDANSETRON 4 MG: 2 INJECTION INTRAMUSCULAR; INTRAVENOUS at 11:45

## 2020-01-02 RX ADMIN — BUPIVACAINE HYDROCHLORIDE 20 ML: 2.5 INJECTION, SOLUTION EPIDURAL; INFILTRATION; INTRACAUDAL at 12:55

## 2020-01-02 RX ADMIN — Medication 100 MCG/MIN: at 11:52

## 2020-01-02 RX ADMIN — KETOROLAC TROMETHAMINE 30 MG: 30 INJECTION, SOLUTION INTRAMUSCULAR at 20:28

## 2020-01-02 RX ADMIN — CEFAZOLIN SODIUM 2 G: 2 INJECTION, SOLUTION INTRAVENOUS at 11:30

## 2020-01-02 RX ADMIN — MIDAZOLAM 1 MG: 1 INJECTION INTRAMUSCULAR; INTRAVENOUS at 11:58

## 2020-01-02 RX ADMIN — Medication 50 MCG/MIN: at 11:26

## 2020-01-02 RX ADMIN — Medication: at 11:45

## 2020-01-02 RX ADMIN — PHENYLEPHRINE HYDROCHLORIDE 100 MCG: 10 INJECTION INTRAVENOUS at 11:52

## 2020-01-02 RX ADMIN — FENTANYL CITRATE 50 MCG: 50 INJECTION, SOLUTION INTRAMUSCULAR; INTRAVENOUS at 12:02

## 2020-01-02 RX ADMIN — SODIUM CHLORIDE, POTASSIUM CHLORIDE, SODIUM LACTATE AND CALCIUM CHLORIDE: 600; 310; 30; 20 INJECTION, SOLUTION INTRAVENOUS at 06:55

## 2020-01-02 RX ADMIN — SODIUM CHLORIDE, POTASSIUM CHLORIDE, SODIUM LACTATE AND CALCIUM CHLORIDE: 600; 310; 30; 20 INJECTION, SOLUTION INTRAVENOUS at 11:00

## 2020-01-02 RX ADMIN — SODIUM CHLORIDE, POTASSIUM CHLORIDE, SODIUM LACTATE AND CALCIUM CHLORIDE: 600; 310; 30; 20 INJECTION, SOLUTION INTRAVENOUS at 11:58

## 2020-01-02 RX ADMIN — SENNOSIDES AND DOCUSATE SODIUM 1 TABLET: 8.6; 5 TABLET ORAL at 20:29

## 2020-01-02 RX ADMIN — KETOROLAC TROMETHAMINE 30 MG: 30 INJECTION, SOLUTION INTRAMUSCULAR at 13:18

## 2020-01-02 RX ADMIN — ONDANSETRON 4 MG: 2 INJECTION INTRAMUSCULAR; INTRAVENOUS at 16:22

## 2020-01-02 RX ADMIN — PHENYLEPHRINE HYDROCHLORIDE 100 MCG: 10 INJECTION INTRAVENOUS at 11:56

## 2020-01-02 RX ADMIN — ACETAMINOPHEN 975 MG: 325 TABLET, FILM COATED ORAL at 22:03

## 2020-01-02 RX ADMIN — Medication 150 MCG/MIN: at 12:05

## 2020-01-02 RX ADMIN — PHENYLEPHRINE HYDROCHLORIDE 100 MCG: 10 INJECTION INTRAVENOUS at 11:55

## 2020-01-02 RX ADMIN — PHENYLEPHRINE HYDROCHLORIDE 100 MCG: 10 INJECTION INTRAVENOUS at 11:58

## 2020-01-02 RX ADMIN — PROCHLORPERAZINE EDISYLATE 10 MG: 5 INJECTION INTRAMUSCULAR; INTRAVENOUS at 18:32

## 2020-01-02 RX ADMIN — OXYTOCIN-SODIUM CHLORIDE 0.9% IV SOLN 30 UNIT/500ML 600 ML/HR: 30-0.9/5 SOLUTION at 11:43

## 2020-01-02 ASSESSMENT — ACTIVITIES OF DAILY LIVING (ADL)
DRESS: 0-->INDEPENDENT
TOILETING: 0-->INDEPENDENT
AMBULATION: 0-->INDEPENDENT
TRANSFERRING: 0-->INDEPENDENT
RETIRED_COMMUNICATION: 0-->UNDERSTANDS/COMMUNICATES WITHOUT DIFFICULTY
FALL_HISTORY_WITHIN_LAST_SIX_MONTHS: NO
COGNITION: 0 - NO COGNITION ISSUES REPORTED
BATHING: 0-->INDEPENDENT
RETIRED_EATING: 0-->INDEPENDENT
SWALLOWING: 0-->SWALLOWS FOODS/LIQUIDS WITHOUT DIFFICULTY

## 2020-01-02 NOTE — PLAN OF CARE
Charge RN Barbara PARKER Consulted SW regarding pt frustration with having to collect a urine on the infant. SW stated that is ok and to document refusal or urine collection on infant.

## 2020-01-02 NOTE — PLAN OF CARE
Writer notified anaesthesilogy resident about patient's oral intake of an apple at 0500 this morning.

## 2020-01-02 NOTE — PLAN OF CARE
Pt to PACU via cart.  VSS, Pitocin infusing without complications, thomas to gravity with straw colored urine. Complains of abdomen pain describes as sore, dressing CDI.  I)IV to pump, compression to pneumoboots re-started.  A) Stable P) pt to inform RN if she experiences pain or nausea.  Post op cares.   Anticipate transfer to 7th floor [facility] at ~2 hours post op.

## 2020-01-02 NOTE — PLAN OF CARE
Spoke with Dr. Bell regarding substance abuse testing. The policy states a current partner, Dr. Bell notified that this is not her current partner. Due to the pt hx and high risk situation back in June of 2019 while she was pregnant she did still want a UTOX done. Mother refused to send a urine on herself. She was notified that the cord would be sent on the infant.

## 2020-01-02 NOTE — ANESTHESIA PROCEDURE NOTES
Spinal/LP Procedure Note    Spinal Block  Staff:     Anesthesiologist:  Mateo Samuel MD  Location: OB and In OR BEFORE Induction  Procedure Start/Stop Times:      1/2/2020 11:19 AM    patient identified, IV checked, site marked, risks and benefits discussed, informed consent, monitors and equipment checked, pre-op evaluation, at physician/surgeon's request and post-op pain management      Correct Patient: Yes      Correct Position: Yes      Correct Site: Yes      Correct Procedure: Yes      Correct Laterality:  Yes    Site Marked:  Yes  Procedure:     Procedure:  Intrathecal    ASA:  2    Diagnosis:  C/S    Position:  Sitting    Sterile Prep: chloraprep      Insertion site:  L2-3    Approach:  Midline    Needle Type:  Sophie    Needle gauge (G):  25    Local Skin Infiltration:  1% lidocaine    amount (ml):  2    Needle Length (in):  3.5    Introducer used: Yes      Introducer gauge:  20 G    Attempts:  1    Redirects:  0    CSF:  Clear    Paresthesias:  No    Time injected:  11:23

## 2020-01-02 NOTE — PLAN OF CARE
Pt arrived to triage 2 at 0620 for scheduled primary  for previa.  Sister in attendance for support presently and FOB en route, per pt.  Pt denies LOF, VB, contractions.  Reports + fetal movement.  Pt placed on monitor, admission assessment obtained, IV started and maintenance fluid initiated.  Dr. Mcgill in for BSU and consents signed.  Pt reports eating an apple at 0500.  MDA made aware and  start time adjusted.  Report given to Elo GARCIA RN.
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
## 2020-01-02 NOTE — PLAN OF CARE
Data: Sharmayne Morgan transferred to [facility] via cart at 1428. Baby transferred via parent's arms.  Action: Receiving unit notified of transfer: Yes. Patient and family notified of room change. Report given to Gui PARKER RN at 1445. Belongings sent to receiving unit. Accompanied by Registered Nurse. Oriented patient to surroundings. Call light within reach. ID bands double-checked with receiving RN.  Response: Patient tolerated transfer and is stable.

## 2020-01-02 NOTE — ANESTHESIA POSTPROCEDURE EVALUATION
Anesthesia POST Procedure Evaluation    Patient: Sharmayne Morgan   MRN:     [id number] Gender:   female   Age:    24 year old :      1995        Preoperative Diagnosis: High-risk pregnancy in third trimester [O09.93]  Complete placenta previa nos or without hemorrhage, third trimester [O44.03]   Procedure(s):  Primary    Postop Comments: No value filed.       Anesthesia Type:  Not documented  MAC, Spinal, Peripheral Nerve Block, For Post-op pain in coordination with surgeon    Reportable Event: NO     PAIN: Uncomplicated   Sign Out status: Comfortable, Well controlled pain     PONV: No PONV   Sign Out status:  No Nausea or Vomiting     Neuro/Psych: Uneventful perioperative course   Sign Out Status: Preoperative baseline; Age appropriate mentation     Airway/Resp.: Uneventful perioperative course   Sign Out Status: Non labored breathing, age appropriate RR; Resp. Status within EXPECTED Parameters     CV: Uneventful perioperative course   Sign Out status: Appropriate BP and perfusion indices; Appropriate HR/Rhythm     Disposition:   Sign Out in:  PACU  Disposition:  Phase II; Home  Recovery Course: Uneventful  Follow-Up: Not required           Last Anesthesia Record Vitals:  CRNA VITALS  2020 1150 - 2020 1250      2020             Resp Rate (observed):  (!) 2          Last PACU Vitals:  Vitals Value Taken Time   /63 2020  1:30 PM   Temp 36.7  C (98  F) 2020 12:30 PM   Pulse 77 2020  1:30 PM   Resp 7 2020  1:38 PM   SpO2 100 % 2020  1:38 PM   Temp src     NIBP     Pulse     SpO2     Resp     Temp     Ht Rate     Temp 2     Vitals shown include unvalidated device data.      Electronically Signed By: Eugenio Penny MD, 2020, 1:39 PM

## 2020-01-02 NOTE — PLAN OF CARE
Patient arrived to [facility] unit via zoom cart at 7130,with belongings, accompanied by FOB, with infant in arms. Received report from Elo casanova RN and checked bands. Unit and room orientation started. Call light given and within arms reach; no concerns present at this time. Continue with plan of care.

## 2020-01-02 NOTE — PLAN OF CARE
Transfer to OR & C/S Delivery Note  Patient to OR at 1105 via ambulation.   Delivered viable Female via  section by Dr. Bell.  To warmer, stimulated and dried by NICU. After NICU Fany OCHOA RN resumed care of infant.  Infant: placenta labeled with mothers label, umbilical cord segment labeled with infant label.
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
## 2020-01-02 NOTE — PROVIDER NOTIFICATION
01/02/20 1230   Provider Notification   Provider Name/Title Dr. Rendon   Method of Notification At Bedside   Request Evaluate in Person   Notification Reason Vital Signs Change   aware of low B/P reading. No interventions at this time.
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
## 2020-01-02 NOTE — ANESTHESIA CARE TRANSFER NOTE
Patient: Sharmayne Morgan    Procedure(s):  Primary     Diagnosis: High-risk pregnancy in third trimester [O09.93]  Complete placenta previa nos or without hemorrhage, third trimester [O44.03]  Diagnosis Additional Information: No value filed.    Anesthesia Type:   MAC, Spinal, Peripheral Nerve Block, For Post-op pain in coordination with surgeon     Note:  Airway :Room Air  Patient transferred to:Labor and Delivery  Handoff Report: Identifed the Patient, Identified the Reponsible Provider, Reviewed the pertinent medical history, Discussed the surgical course, Reviewed Intra-OP anesthesia mangement and issues during anesthesia, Set expectations for post-procedure period and Allowed opportunity for questions and acknowledgement of understanding      Vitals: (Last set prior to Anesthesia Care Transfer)    CRNA VITALS  2020 1150 - 2020 1223      2020             Resp Rate (observed):  (!) 2                Electronically Signed By: ANGELA Baker CRNA  2020  12:23 PM

## 2020-01-02 NOTE — ANESTHESIA PROCEDURE NOTES
Peripheral Nerve Block Procedure Note    Staff:     Anesthesiologist:  Mateo Samuel MD    Resident/CRNA:  Silvio Rendon III, MD    Block performed by resident/CRNA in the presence of a teaching physician    Location: PACU  Procedure Start/Stop TImes:      1/2/2020 12:45 PM     1/2/2020 1:00 PM    patient identified, IV checked, site marked, risks and benefits discussed, informed consent, monitors and equipment checked, pre-op evaluation, at physician/surgeon's request and post-op pain management      Correct Patient: Yes      Correct Position: Yes      Correct Site: Yes      Correct Procedure: Yes      Correct Laterality:  N/A    Site Marked:  N/A  Procedure details:     Procedure:  TAP    ASA:  2    Laterality:  Bilateral    Position:  Supine    Sterile Prep: chloraprep, mask and sterile gloves      Needle:  Insulated    Needle gauge:  22    Ultrasound: Yes      Ultrasound used to identify targeted nerve, plexus, or vascular structure and placed a needle adjacent to it      Permanent Image entered into patiient's record      Abnormal pain on injection: No      Blood Aspirated: No      Paresthesias:  No    Bleeding at site: No      Bolus via:  Needle    Infusion Method:  Single Shot    Blood aspirated via catheter: No      Complications:  None  Assessment/Narrative:      Routine TAP block
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
## 2020-01-03 LAB
HGB BLD-MCNC: 9.3 G/DL (ref 11.7–15.7)
T PALLIDUM AB SER QL: NONREACTIVE

## 2020-01-03 PROCEDURE — 12000001 ZZH R&B MED SURG/OB UMMC

## 2020-01-03 PROCEDURE — 85018 HEMOGLOBIN: CPT | Performed by: STUDENT IN AN ORGANIZED HEALTH CARE EDUCATION/TRAINING PROGRAM

## 2020-01-03 PROCEDURE — 25000128 H RX IP 250 OP 636: Performed by: STUDENT IN AN ORGANIZED HEALTH CARE EDUCATION/TRAINING PROGRAM

## 2020-01-03 PROCEDURE — 36415 COLL VENOUS BLD VENIPUNCTURE: CPT | Performed by: STUDENT IN AN ORGANIZED HEALTH CARE EDUCATION/TRAINING PROGRAM

## 2020-01-03 PROCEDURE — 25000132 ZZH RX MED GY IP 250 OP 250 PS 637: Performed by: STUDENT IN AN ORGANIZED HEALTH CARE EDUCATION/TRAINING PROGRAM

## 2020-01-03 RX ORDER — IBUPROFEN 600 MG/1
600 TABLET, FILM COATED ORAL EVERY 6 HOURS PRN
Qty: 60 TABLET | Refills: 0 | Status: SHIPPED | OUTPATIENT
Start: 2020-01-03 | End: 2020-03-23

## 2020-01-03 RX ORDER — FERROUS SULFATE 325(65) MG
325 TABLET ORAL
Qty: 60 TABLET | Refills: 0 | Status: SHIPPED | OUTPATIENT
Start: 2020-01-03 | End: 2020-03-23

## 2020-01-03 RX ORDER — AMOXICILLIN 250 MG
1 CAPSULE ORAL DAILY
Qty: 100 TABLET | Refills: 0 | Status: SHIPPED | OUTPATIENT
Start: 2020-01-03 | End: 2020-03-23

## 2020-01-03 RX ORDER — ACETAMINOPHEN 325 MG/1
650 TABLET ORAL EVERY 6 HOURS PRN
Qty: 100 TABLET | Refills: 0 | Status: SHIPPED | OUTPATIENT
Start: 2020-01-03 | End: 2020-03-23

## 2020-01-03 RX ADMIN — ACETAMINOPHEN 975 MG: 325 TABLET, FILM COATED ORAL at 22:25

## 2020-01-03 RX ADMIN — SENNOSIDES AND DOCUSATE SODIUM 2 TABLET: 8.6; 5 TABLET ORAL at 20:51

## 2020-01-03 RX ADMIN — KETOROLAC TROMETHAMINE 30 MG: 30 INJECTION, SOLUTION INTRAMUSCULAR at 10:07

## 2020-01-03 RX ADMIN — IBUPROFEN 800 MG: 800 TABLET, FILM COATED ORAL at 16:29

## 2020-01-03 RX ADMIN — ACETAMINOPHEN 975 MG: 325 TABLET, FILM COATED ORAL at 05:51

## 2020-01-03 RX ADMIN — IBUPROFEN 800 MG: 800 TABLET, FILM COATED ORAL at 22:25

## 2020-01-03 RX ADMIN — KETOROLAC TROMETHAMINE 30 MG: 30 INJECTION, SOLUTION INTRAMUSCULAR at 03:47

## 2020-01-03 RX ADMIN — ACETAMINOPHEN 975 MG: 325 TABLET, FILM COATED ORAL at 14:14

## 2020-01-03 NOTE — PLAN OF CARE
Vital signs stable. Postpartum assessment WDL. Incision clean, dry, with dressing in place. Pain controlled with Toradol and Tylenol. Sagastume in place and adequate urine output.  Pitocin infusing. Patient not passing gas yet, reports nausea is getting better after Zofran and Compazine.  Breastfeeding on cue with minimal assist. Patient and infant bonding well. Will continue with current plan of care.
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
## 2020-01-03 NOTE — PLAN OF CARE
Patient has good pain control with current pain regimen. Moving well in the room, showered independently and bonding well with baby.  Voiding freely post thomas. Breastfeeding on demand. Was encouraged to pump after feedings and did one time. Will continue with plan of care and assist as needed.

## 2020-01-03 NOTE — PROVIDER NOTIFICATION
01/02/20 1800   Provider Notification   Provider Name/Title Dr. Mcgill   Method of Notification Phone   Request Evaluate-Remote   Notification Reason Medication Request   MD notified that patient having nausea and emesis despite Zofran, telephone order received for IV Compazine.

## 2020-01-03 NOTE — PLAN OF CARE
Data: Vital signs and postpartum checks WDL  Patient eating and drinking normally. Patient able to to ambulate from bed to the bathroom without dizziness or SOB.  Sagastume was removed at 0500. Patient performing self cares and is able to care for infant. Breastfeeding on demand but been sleeping most of the time saying she's so tired and want to sleep. Supplemented baby with formula. Refused to pump saying no pumping period.  Action: Patient medicated during the shift for pain with Tylenol and Toradol with relief after 1 hour. Patient education done see education record.  Response: Positive attachment behaviors observed with infant. Support persons SO present.    Plan: Continue with the plan of care

## 2020-01-03 NOTE — PROGRESS NOTES
"Post  Anesthesia Follow Up Note    Patient: Sharmayne Morgan    Chief complaint: Acute postoperative pain managment    Procedure(s) Performed:   section    Anesthesia type: Spinal Block and transversus abdominus plane (TAP) nerve block        Subjective:   The patient reports good pain control.  Pain Intensity: 0/10.  Patient reports no pruritus, denies weakness, paresthesia.  No reports of numbness, tingling lips, tinnitus, metallic taste, difficulties breathing nausea, vomiting. She is able to ambulate and tolerates regular diet.        Objective:  Respiratory Function (RR / SpO2 / Airway Patency): Satisfactory    Cardiac Function (HR / Rhythm / BP): Satisfactory    Strength and sensation lower extremities: Strength 5/5 and grossly symmetric bilateral LE    Last Vitals: /73   Pulse 89   Temp 36.8  C (98.3  F) (Oral)   Resp 17   LMP 2019   SpO2 100%   Breastfeeding Unknown       Assessment and Plan:   Sharmayne Morgan is a 24 year old female POD #1 s/p   section with spinal 150 mcg morphine and hyperbaric bupivacaine 0.75% 1.6 mL IT and single shot TAP nerve block injections bupivacaine 0.25% with liposome bupivacaine (Exparel) long-acting 1.3% 20mL given for postoperative analgesia.      Pt is ambulating without difficulty, no weakness or paresthesias.  No evidence of adverse side effects associated with spinal and nerve block injections. Pt is receiving adequate incisional pain control.  Anticipate up to 72 hours of incisional pain control.  Anticipate patient will require opioid/nonopioid analgesics for visceral and muscle pain not controlled with local anesthetic.      - NO other local anesthetic use within 96 hours of liposome bupivacaine (Exparel) long acting  - patient received verbal and written instructions about liposome bupivacaine   - please call if questions or concerns  - discussed plan with attending anesthesiologist      Silvio \"Bogdan\" Justice MURPHY, " MD  Anesthesia Resident  1/3/2020  6:30 AM    If questions or concerns, please contact OB Anesthesiologist  Phone [phone]
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
## 2020-01-04 VITALS
DIASTOLIC BLOOD PRESSURE: 74 MMHG | HEART RATE: 79 BPM | TEMPERATURE: 98.5 F | SYSTOLIC BLOOD PRESSURE: 126 MMHG | OXYGEN SATURATION: 100 % | RESPIRATION RATE: 16 BRPM

## 2020-01-04 PROCEDURE — 25000128 H RX IP 250 OP 636: Performed by: OBSTETRICS & GYNECOLOGY

## 2020-01-04 PROCEDURE — 25000132 ZZH RX MED GY IP 250 OP 250 PS 637: Performed by: STUDENT IN AN ORGANIZED HEALTH CARE EDUCATION/TRAINING PROGRAM

## 2020-01-04 RX ORDER — MEDROXYPROGESTERONE ACETATE 150 MG/ML
150 INJECTION, SUSPENSION INTRAMUSCULAR ONCE
Status: COMPLETED | OUTPATIENT
Start: 2020-01-04 | End: 2020-01-04

## 2020-01-04 RX ORDER — OXYCODONE HYDROCHLORIDE 5 MG/1
5 TABLET ORAL
Qty: 3 TABLET | Refills: 0 | Status: SHIPPED | OUTPATIENT
Start: 2020-01-04 | End: 2020-03-23

## 2020-01-04 RX ADMIN — IBUPROFEN 800 MG: 800 TABLET, FILM COATED ORAL at 06:04

## 2020-01-04 RX ADMIN — MEDROXYPROGESTERONE ACETATE 150 MG: 150 INJECTION, SUSPENSION INTRAMUSCULAR at 10:33

## 2020-01-04 RX ADMIN — ACETAMINOPHEN 975 MG: 325 TABLET, FILM COATED ORAL at 06:04

## 2020-01-04 RX ADMIN — SENNOSIDES AND DOCUSATE SODIUM 2 TABLET: 8.6; 5 TABLET ORAL at 08:32
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
## 2020-01-04 NOTE — PROVIDER NOTIFICATION
01/04/20 1050   Provider Notification   Provider Name/Title Kjertstin SW   Method of Notification Electronic Page   Request Evaluate-Remote   Notification Reason Other  (EDPS= 10)

## 2020-01-04 NOTE — PLAN OF CARE
Patient vital signs stable. Postpartum assessment within normal limits. Incision open to air. Pain managed with tylenol and ibuprofen. Patient eating and drinking normally. Patient able to empty bladder independently and is up ambulating. Patient performing self cares and is caring for infant. Encouraged patient to breastfeed and/or pump overnight. Continue with plan of care

## 2020-01-04 NOTE — PLAN OF CARE
Data: Vital signs within normal limits. Postpartum checks within normal limits - see flow record. Patient eating and drinking normally. Patient able to empty bladder independently and is up ambulating. No apparent signs of infection. Incision healing well. Patient performing self cares and is able to care for infant.  Action: Patient medicated during the shift for pain. See MAR. Patient reassessed within 1 hour after each medication and pain was improved - patient stated she was comfortable. Patient discharge education and instructions done.  Response: Positive attachment behaviors observed with infant.   Plan: Discharge to home today with baby.

## 2020-01-04 NOTE — PLAN OF CARE
Vitals are stable. Pain well control with Tylenol and Ibuprofen. Ambulating well in room. Regular diet. Voiding without difficulty. Breast feeding independently. Alternating with formula at times. No complaints. Will continue with plan of care.

## 2020-01-05 ENCOUNTER — TELEPHONE (OUTPATIENT)
Dept: OBGYN | Facility: CLINIC | Age: 25
End: 2020-01-05

## 2020-01-05 ENCOUNTER — NURSE TRIAGE (OUTPATIENT)
Dept: NURSING | Facility: CLINIC | Age: 25
End: 2020-01-05

## 2020-01-05 DIAGNOSIS — G89.18 POSTOPERATIVE PAIN: Primary | ICD-10-CM

## 2020-01-05 DIAGNOSIS — Z98.891 S/P CESAREAN SECTION: ICD-10-CM

## 2020-01-05 RX ORDER — OXYCODONE HYDROCHLORIDE 5 MG/1
5 TABLET ORAL EVERY 6 HOURS PRN
Qty: 10 TABLET | Refills: 0 | Status: SHIPPED | OUTPATIENT
Start: 2020-01-05 | End: 2020-03-23

## 2020-01-05 NOTE — TELEPHONE ENCOUNTER
Patient called FNA today.  Spoke with RN who reports patient stated she woke up overnight with right sided burning over incision.  Had to start taking oxycodone overnight since Tylenol and Ibuprofen wasn't cutting it. Hadn't taken any prior to discharge and got a prescription for 3.      Incision looks good.  No other complaints.  Just wants to be sure this pain is normal and requests refill for oxy.    Told RN this sounds very normal at this point.  Sharmayne is POD3 today, which is when TAP block often wears off.  Pain she described is quite common.  Given refill for oxycodone 5mg #10 and asked that she let patient know if she requires another refill that I'd like to see her in clinic.    Debby Bonilla MD

## 2020-01-05 NOTE — TELEPHONE ENCOUNTER
"Sharmayne s/p C section on Thursday 1/2/20, discharged yesterday (1/4/20) at 14:00.  TAP has worn off, overnight developed right side incision pain and numbness.  Reports it \"hurts from the inside out\", described as a \"burning/fire sensation\" both at rest and with movement, no increase with movement.  Pain, numbness and \"fire\" sensation all only on right side at this point.  Is taking OTC Ibuprofen and Tylenol, also took final dose of Oxycodone today at 06:00 which has made pain more tolerable.  No concerns over appearance of incision, is afebrile.  Only able to sleep 2 hours overnight due to symptoms.  This nurse did speak with OB provider Dr. Bonilla.  Per Dr. Bonilla, sensations described are normal after block wears off (normally day 2-3).  Symptoms should slowly improve, but may last 1-2 weeks.  Refill of Oxycodone sent to pharmacy, advised that if further pain meds are needed, Sharmayne will need to be seen in clinic first.  All information relayed to Sharmayne who verbalizes understanding.  No further concerns / questions at this time, will roxanne as needed.     Reason for Disposition    [1] MILD-MODERATE post-op pain (e.g., pain scale 1-7) AND [2] not controlled with pain medications    Additional Information    Negative: Severe pain in the incision    Negative: [1] Incision gaping open AND [2] length of opening > 1/2 inch (1 cm)    Negative: Patient sounds very sick or weak to the triager    Negative: [1] Bleeding from incision AND [2] won't stop after 10 minutes of direct pressure    Negative: [1] Widespread rash AND [2] bright red, sunburn-like    Negative: Fever > 100.4 F (38.0 C)    Negative: [1] Incision looks infected (spreading redness, pain) AND [2] fever > 99.5 F (37.5 C)    Negative: [1] Incision looks infected (spreading redness, pain) AND [2] large red area (> 2 in. or 5 cm)    Negative: [1] Pus or bad-smelling fluid draining from incision AND [2] no fever    Negative: Dressing soaked with blood or " body fluid (e.g., drainage)    Negative: [1] Caller has URGENT question AND [2] triager unable to answer question    Negative: Suture or staple removal is overdue    Negative: [1] Small red area or streak AND [2] no fever    Negative: [1] Clear or blood-tinged fluid draining from wound AND [2] no fever    Negative: [1] 48 hours since surgery AND [2] wound is becoming more tender    Protocols used: POSTPARTUM -  INCISION SYMPTOMS-A-AH

## 2020-01-22 ENCOUNTER — DOCUMENTATION ONLY (OUTPATIENT)
Dept: CARE COORDINATION | Facility: CLINIC | Age: 25
End: 2020-01-22

## 2020-01-22 LAB — COPATH REPORT: NORMAL

## 2020-01-22 NOTE — PROGRESS NOTES
[facility] will be sharing updates with you on Maternal Child Health Referral requests for home care services.  This is for care coordination purposes and alert you to referral status.  We received the referral for  Sharmayne Morgan; MRN [id number] and want to update you:    [facility] has made 2 attempts to contact patient by phone and text message over the last 4 days.   We have not had any response from patient.  Final message was left advising patient to follow up with Primary Care Providers for mom and baby.     Sincerely [facility]  Lokesh Waite  [phone]

## 2020-02-18 ENCOUNTER — PRENATAL OFFICE VISIT (OUTPATIENT)
Dept: OBGYN | Facility: CLINIC | Age: 25
End: 2020-02-18
Payer: COMMERCIAL

## 2020-02-18 VITALS
HEART RATE: 91 BPM | OXYGEN SATURATION: 100 % | SYSTOLIC BLOOD PRESSURE: 115 MMHG | DIASTOLIC BLOOD PRESSURE: 67 MMHG | BODY MASS INDEX: 23.03 KG/M2 | WEIGHT: 130 LBS

## 2020-02-18 PROCEDURE — 99207 ZZC POST PARTUM EXAM: CPT | Performed by: OBSTETRICS & GYNECOLOGY
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
## 2020-03-23 ENCOUNTER — ALLIED HEALTH/NURSE VISIT (OUTPATIENT)
Dept: NURSING | Facility: CLINIC | Age: 25
End: 2020-03-23
Payer: COMMERCIAL

## 2020-03-23 ENCOUNTER — TELEPHONE (OUTPATIENT)
Dept: OBGYN | Facility: CLINIC | Age: 25
End: 2020-03-23

## 2020-03-23 VITALS
BODY MASS INDEX: 23.01 KG/M2 | WEIGHT: 129.9 LBS | SYSTOLIC BLOOD PRESSURE: 106 MMHG | DIASTOLIC BLOOD PRESSURE: 67 MMHG | TEMPERATURE: 98.5 F | HEART RATE: 76 BPM

## 2020-03-23 DIAGNOSIS — Z30.9 CONTRACEPTIVE MANAGEMENT: Primary | ICD-10-CM

## 2020-03-23 PROCEDURE — 96372 THER/PROPH/DIAG INJ SC/IM: CPT

## 2020-03-23 RX ORDER — MEDROXYPROGESTERONE ACETATE 150 MG/ML
150 INJECTION, SUSPENSION INTRAMUSCULAR
Status: ACTIVE | OUTPATIENT
Start: 2020-03-23

## 2020-03-23 RX ADMIN — MEDROXYPROGESTERONE ACETATE 150 MG: 150 INJECTION, SUSPENSION INTRAMUSCULAR at 12:06

## 2020-03-23 NOTE — LETTER
March 23, 2020      Sharmayne Morgan  [address]  [place] [id number]        To Whom It May Concern:    Sharmayne Morgan was seen in our clinic on February 18, 2020 for her post-partum exam. She may return to work without restrictions.      Sincerely,        Roxanna Richardson MD

## 2020-03-23 NOTE — TELEPHONE ENCOUNTER
Pt came today for depo injection. Requesting letter to return to work. Letter written.   Lolis Keith RN-BSN

## 2020-03-23 NOTE — NURSING NOTE
Clinic Administered Medication Documentation      Depo Provera Documentation    URINE HCG: not indicated    Depo-Provera Standing Order inclusion/exclusion criteria reviewed.   Patient meets: inclusion criteria     BP: 106/67  LAST PAP/EXAM: No results found for: PAP    Prior to injection, verified patient identity using patient's name and date of birth. Medication was administered. Please see MAR and medication order for additional information.     Was entire vial of medication used? Yes  Vial/Syringe: Single dose vial  Expiration Date:  10/2020    Patient instructed to remain in clinic for 15 minutes.  NEXT INJECTION DUE: 6/8/20 - 6/22/20  Given right ventrogluteal  Elaine Warner

## 2020-06-13 NOTE — TELEPHONE ENCOUNTER
Anesthesia Post Evaluation    Patient: Laure Ross    Procedure(s) Performed: Procedure(s) (LRB):  CARDIOVERSION (N/A)  Transesophageal echo (BRITTANEY) intra-procedure log documentation (N/A)    Final Anesthesia Type: general    Patient location during evaluation: PACU  Patient participation: Yes- Able to Participate  Level of consciousness: awake and alert  Post-procedure vital signs: reviewed and stable  Pain management: adequate  Airway patency: patent    PONV status at discharge: No PONV  Anesthetic complications: no      Cardiovascular status: hemodynamically stable  Respiratory status: unassisted  Hydration status: euvolemic  Follow-up not needed.          Vitals Value Taken Time   /76 06/12/20 1535   Temp 36.5 °C (97.7 °F) 06/12/20 1513   Pulse 83 06/12/20 1541   Resp 12 06/12/20 1541   SpO2 96 % 06/12/20 1541   Vitals shown include unvalidated device data.      Event Time   Out of Recovery 14:44:00         Pain/Jannet Score: Jannet Score: 9 (6/12/2020  4:01 PM)         Patient is 12w2d, unable to keep anything down - fluids and food. Trying to drink fluids and unable. Going to the bathroom but not much comes out. Also feeling constipated. Vitamin B6 and unisom not helping. Can you send a prescription for Zofran? Pharmacy teed up. Discussed with pt when to present to ED for IV fluids. Pt stated understanding.   Lolis Keith RN-BSN

## 2020-07-02 ENCOUNTER — ALLIED HEALTH/NURSE VISIT (OUTPATIENT)
Dept: NURSING | Facility: CLINIC | Age: 25
End: 2020-07-02
Payer: COMMERCIAL

## 2020-07-02 VITALS — SYSTOLIC BLOOD PRESSURE: 97 MMHG | TEMPERATURE: 98.9 F | DIASTOLIC BLOOD PRESSURE: 66 MMHG

## 2020-07-02 DIAGNOSIS — Z30.42 DEPO-PROVERA CONTRACEPTIVE STATUS: Primary | ICD-10-CM

## 2020-07-02 LAB — HCG UR QL: NEGATIVE

## 2020-07-02 PROCEDURE — 96372 THER/PROPH/DIAG INJ SC/IM: CPT

## 2020-07-02 PROCEDURE — 81025 URINE PREGNANCY TEST: CPT | Performed by: OBSTETRICS & GYNECOLOGY

## 2020-07-02 RX ADMIN — MEDROXYPROGESTERONE ACETATE 150 MG: 150 INJECTION, SUSPENSION INTRAMUSCULAR at 11:33

## 2020-07-02 NOTE — NURSING NOTE
Clinic Administered Medication Documentation      Depo Provera Documentation    URINE HCG: negative    Depo-Provera Standing Order inclusion/exclusion criteria reviewed.   Patient meets: inclusion criteria     BP: 97/66  LAST PAP/EXAM: No results found for: PAP    Prior to injection, verified patient identity using patient's name and date of birth. Medication was administered. Please see MAR and medication order for additional information.     Was entire vial of medication used? Yes  Vial/Syringe: Single dose vial  Expiration Date:  3/2021    Patient instructed to remain in clinic for 15 minutes.  NEXT INJECTION DUE: 9/17/20 - 10/1/20

## 2020-12-06 ENCOUNTER — HEALTH MAINTENANCE LETTER (OUTPATIENT)
Age: 25
End: 2020-12-06

## 2021-04-05 NOTE — PROGRESS NOTES
Called Sharmayne and discussed. 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 She has not yet started Seroquel.     I discussed with her that as a general rule, taking a nighttime dose of Seroquel does not negatively impact peoples' abilities to drive, but that given the severity of the sedation she experienced on olaznapine, I would want her to be careful driving immediately after starting Seroquel until she is certain it is not causing her significant daytime sedation. She will plan to start the medication on the weekend and call if there are significant sedation issues.    [Joint Pain] : joint pain [Negative] : Heme/Lymph

## 2021-04-11 ENCOUNTER — HEALTH MAINTENANCE LETTER (OUTPATIENT)
Age: 26
End: 2021-04-11

## 2021-07-20 NOTE — H&P
OBSTETRICS TRIAGE ASSESSMENT NOTE    Sharmayne T Morgan is a 23 y.o.  at 25 wk based on 12w3d ultrasound who has presented to maternity care for further evaluation of fluid leaking this morning. No bleeding or contractions. Baby is moving normally. Did have some suprapubic pressure. No vaginal discharge. No chest pain or shortness of breath.     PRENATAL CARE  Seen by [facility]  OB History      Para Term  AB Living    3 2 2   2    SAB TAB Ectopic Multiple Live Births        2          PAST MEDICAL HISTORY  Past Medical History:   Diagnosis Date     Elective         PAST SURGICAL HISTORY   No past surgical history on file.    MEDICATIONS    Current Facility-Administered Medications:      lactated Ringers, 0-500 mL/hr, Intravenous, Continuous, Yasemin Laura MD    SOCIAL HISTORY:   Social History     Social History     Marital status: Single     Spouse name: N/A     Number of children: N/A     Years of education: N/A     Occupational History     Not on file.     Social History Main Topics     Smoking status: Not on file     Smokeless tobacco: Not on file     Alcohol use Not on file     Drug use: Not on file     Sexual activity: Not on file     Other Topics Concern     Not on file     Social History Narrative       PHYSICAL EXAMINATION   BP 92/55 (Patient Position: Semi-gonzalez)  Pulse 72  Temp 98.2  F (36.8  C) (Oral)   Resp 18  Gen: appears comfortable, no acute distress  Abdomen: Gravid, non-tender fundus  Extremities: no lower extremity edema   FHT: Category 1 tracing; baseline 150 with moderate variability and accelerations, no decelerations  Contractions: none, slight irritability     LAB RESULTS  Personally reviewed.  Recent Results (from the past 24 hour(s))   Rupture of Membrane Test or Screen    Collection Time: 18  1:11 PM   Result Value Ref Range    Rupture Fetal Membrane Negative Negative   Urinalysis-UC if Indicated    Collection Time: 18   1:12 PM   Result Value Ref Range    Color, UA Yellow Colorless, Yellow, Straw, Light Yellow    Clarity, UA Clear Clear    Glucose, UA Negative Negative    Bilirubin, UA Negative Negative    Ketones, UA Trace (!) Negative, 60 mg/dL    Specific Gravity, UA 1.019 1.001 - 1.030    Blood, UA Negative Negative    pH, UA 8.0 4.5 - 8.0    Protein, UA Trace (!) Negative mg/dL    Urobilinogen, UA 2.0 E.U./dL <2.0 E.U./dL, 2.0 E.U./dL    Nitrite, UA Negative Negative    Leukocytes, UA Negative Negative    Bacteria, UA None Seen None Seen hpf    RBC, UA 0-2 None Seen, 0-2 hpf    WBC, UA 0-5 None Seen, 0-5 hpf    Squam Epithel, UA 25-50 (!) None Seen, 0-5 lpf    Mucus, UA Few (!) None Seen lpf   Collect and send wet prep vaginal specimen as indicated by prenatal history and/or patient complaints    Collection Time: 08/21/18  1:12 PM   Result Value Ref Range    Yeast Result No yeast seen No yeast seen    Trichomonas No Trichomonas seen No Trichomonas seen    Clue Cells, Wet Prep No Clue cells seen No Clue cells seen       ASSESSMENT:  Sharmayne T Morgan is a 23 y.o. year old at about 25 weeks by 12 week ultrasound, presenting with fluid leakage and suprapubic pressure.  She does not continue to have significant fluid leak which with standing up, ROM plus was negative, no contractions on monitor, so I do not think that she has had rupture of membranes.  UA was not consistent with UTI and she does not have dysuria, so I will not treat for UTI.  However, I will obtain urine culture and would treat in pregnancy if positive.  She denies vaginal discharge, so did not obtain gonorrhea or chlamydia testing.  However, if her symptoms do not resolve in the next few days, I did advise her to be tested for gonorrhea and chlamydia in clinic.  She would like to change clinics from where she is currently receiving care, so I did provide names and locations of 3 clinics close to this hospital that have providers who deliver at [facility] ([facility], [facility], [facility]). She requested prescriptions for PNV, iron, and zyprexa.  I was willing to provide paper scripts for her prenatal vitamin and iron, but told her she needed to see her regular doctor for Zyprexa.  She decided to  the prescriptions at [facility] where they were sent earlier today.    PLAN:    Discharged home    Follow-up in clinic this week if continued symptoms and to establish care if changing clinics    Return to L&D contractions or bleeding    Selam Billings MD, MPH  [facility] Family Medicine Resident PGY3  Pager [phone]    Precepting patient with  Dr. Lucie Caban

## 2021-09-25 ENCOUNTER — HEALTH MAINTENANCE LETTER (OUTPATIENT)
Age: 26
End: 2021-09-25

## 2022-05-07 ENCOUNTER — HEALTH MAINTENANCE LETTER (OUTPATIENT)
Age: 27
End: 2022-05-07

## 2022-08-22 NOTE — TELEPHONE ENCOUNTER
Attempted to reach pt at [phone] and [phone]. No answer at either number. Writer attempted to reach pt's emergency contact, her mom Maribel, at [phone]. No answer at that number either and VM inbox is full so no option to leave a message.   Pt is calling to check on status of request    Message sent to clinical pool

## 2023-01-07 ENCOUNTER — HEALTH MAINTENANCE LETTER (OUTPATIENT)
Age: 28
End: 2023-01-07

## 2023-06-02 ENCOUNTER — HEALTH MAINTENANCE LETTER (OUTPATIENT)
Age: 28
End: 2023-06-02

## (undated) DEVICE — SU MONOCRYL 4-0 PS-2 18" UND Y496G

## (undated) DEVICE — GLOVE ESTEEM POWDER FREE SMT 6.5  2D72PT65

## (undated) DEVICE — PREP CHLORAPREP 26ML TINTED ORANGE  260815

## (undated) DEVICE — STOCKING SLEEVE COMPRESSION CALF LG

## (undated) DEVICE — DRSG STERI STRIP 1/4X3" R1541

## (undated) DEVICE — ESU GROUND PAD UNIVERSAL W/O CORD

## (undated) DEVICE — BARRIER INTERCEED 3X4" 4350

## (undated) DEVICE — STRAP KNEE/BODY 31143004

## (undated) DEVICE — SU PLAIN 2-0 CT 27" 853H

## (undated) DEVICE — SUCTION CANISTER MEDIVAC LINER 1500ML W/LID 65651-515

## (undated) DEVICE — SU VICRYL 0 CT-1 36" J346H

## (undated) DEVICE — PACK C-SECTION LF PL15OTA83B

## (undated) DEVICE — GLOVE PROTEXIS BLUE W/NEU-THERA 7.0  2D73EB70

## (undated) DEVICE — CATH TRAY FOLEY 16FR BARDEX W/DRAIN BAG STATLOCK 300316A

## (undated) DEVICE — BASIN SET MAJOR

## (undated) DEVICE — SOL NACL 0.9% IRRIG 1000ML BOTTLE 07138-09

## (undated) DEVICE — SOL WATER IRRIG 1000ML BOTTLE 07139-09

## (undated) RX ORDER — PROPOFOL 10 MG/ML
INJECTION, EMULSION INTRAVENOUS
Status: DISPENSED
Start: 2020-01-02

## (undated) RX ORDER — PHENYLEPHRINE HCL IN 0.9% NACL 1 MG/10 ML
SYRINGE (ML) INTRAVENOUS
Status: DISPENSED
Start: 2020-01-02

## (undated) RX ORDER — MORPHINE SULFATE 1 MG/ML
INJECTION, SOLUTION EPIDURAL; INTRATHECAL; INTRAVENOUS
Status: DISPENSED
Start: 2020-01-02

## (undated) RX ORDER — KETOROLAC TROMETHAMINE 30 MG/ML
INJECTION, SOLUTION INTRAMUSCULAR; INTRAVENOUS
Status: DISPENSED
Start: 2020-01-02

## (undated) RX ORDER — FENTANYL CITRATE 50 UG/ML
INJECTION, SOLUTION INTRAMUSCULAR; INTRAVENOUS
Status: DISPENSED
Start: 2020-01-02

## (undated) RX ORDER — ONDANSETRON 2 MG/ML
INJECTION INTRAMUSCULAR; INTRAVENOUS
Status: DISPENSED
Start: 2020-01-02

## (undated) RX ORDER — ACETAMINOPHEN 325 MG/1
TABLET ORAL
Status: DISPENSED
Start: 2020-01-02

## (undated) RX ORDER — METHYLERGONOVINE MALEATE 0.2 MG/ML
INJECTION INTRAVENOUS
Status: DISPENSED
Start: 2020-01-02